# Patient Record
Sex: FEMALE | Race: WHITE | NOT HISPANIC OR LATINO | Employment: FULL TIME | URBAN - METROPOLITAN AREA
[De-identification: names, ages, dates, MRNs, and addresses within clinical notes are randomized per-mention and may not be internally consistent; named-entity substitution may affect disease eponyms.]

---

## 2018-12-27 NOTE — TELEPHONE ENCOUNTER
FUTURE VISIT INFORMATION      FUTURE VISIT INFORMATION:    Date: 1/2/19    Time:     Location: [facility] [place]  REFERRAL INFORMATION:    Referring provider:  self    Referring providers clinic:      Reason for visit/diagnosis  Low back and rt hip pain - No records or xrays - per pt     RECORDS REQUESTED FROM:       Clinic name Comments Records Status Imaging Status     internal

## 2018-12-31 ENCOUNTER — PATIENT OUTREACH (OUTPATIENT)
Dept: CARE COORDINATION | Facility: CLINIC | Age: 53
End: 2018-12-31

## 2019-01-02 ENCOUNTER — ANCILLARY PROCEDURE (OUTPATIENT)
Dept: GENERAL RADIOLOGY | Facility: CLINIC | Age: 54
End: 2019-01-02
Payer: COMMERCIAL

## 2019-01-02 ENCOUNTER — OFFICE VISIT (OUTPATIENT)
Dept: ORTHOPEDICS | Facility: CLINIC | Age: 54
End: 2019-01-02
Payer: COMMERCIAL

## 2019-01-02 ENCOUNTER — PRE VISIT (OUTPATIENT)
Dept: ORTHOPEDICS | Facility: CLINIC | Age: 54
End: 2019-01-02

## 2019-01-02 VITALS — BODY MASS INDEX: 36 KG/M2 | RESPIRATION RATE: 16 BRPM | WEIGHT: 224 LBS | HEIGHT: 66 IN

## 2019-01-02 DIAGNOSIS — M54.5 LOW BACK PAIN, UNSPECIFIED BACK PAIN LATERALITY, UNSPECIFIED CHRONICITY, WITH SCIATICA PRESENCE UNSPECIFIED: ICD-10-CM

## 2019-01-02 DIAGNOSIS — M16.12 PRIMARY OSTEOARTHRITIS OF LEFT HIP: ICD-10-CM

## 2019-01-02 DIAGNOSIS — M25.551 RIGHT HIP PAIN: ICD-10-CM

## 2019-01-02 DIAGNOSIS — M25.551 RIGHT HIP PAIN: Primary | ICD-10-CM

## 2019-01-02 DIAGNOSIS — M16.11 PRIMARY OSTEOARTHRITIS OF RIGHT HIP: ICD-10-CM

## 2019-01-02 DIAGNOSIS — M16.12 PRIMARY OSTEOARTHRITIS OF LEFT HIP: Primary | ICD-10-CM

## 2019-01-02 LAB
ANION GAP SERPL CALCULATED.3IONS-SCNC: 5 MMOL/L (ref 3–14)
BUN SERPL-MCNC: 21 MG/DL (ref 7–30)
CALCIUM SERPL-MCNC: 8.4 MG/DL (ref 8.5–10.1)
CHLORIDE SERPL-SCNC: 106 MMOL/L (ref 94–109)
CO2 SERPL-SCNC: 28 MMOL/L (ref 20–32)
CREAT SERPL-MCNC: 0.85 MG/DL (ref 0.52–1.04)
CRP SERPL-MCNC: 3.8 MG/L (ref 0–8)
ERYTHROCYTE [DISTWIDTH] IN BLOOD BY AUTOMATED COUNT: 13.8 % (ref 10–15)
ERYTHROCYTE [SEDIMENTATION RATE] IN BLOOD BY WESTERGREN METHOD: 10 MM/H (ref 0–30)
GFR SERPL CREATININE-BSD FRML MDRD: 78 ML/MIN/{1.73_M2}
GLUCOSE SERPL-MCNC: 103 MG/DL (ref 70–99)
HCT VFR BLD AUTO: 44.5 % (ref 35–47)
HGB BLD-MCNC: 14.4 G/DL (ref 11.7–15.7)
MCH RBC QN AUTO: 30.6 PG (ref 26.5–33)
MCHC RBC AUTO-ENTMCNC: 32.4 G/DL (ref 31.5–36.5)
MCV RBC AUTO: 95 FL (ref 78–100)
PLATELET # BLD AUTO: 305 10E9/L (ref 150–450)
POTASSIUM SERPL-SCNC: 4.1 MMOL/L (ref 3.4–5.3)
RBC # BLD AUTO: 4.7 10E12/L (ref 3.8–5.2)
SODIUM SERPL-SCNC: 139 MMOL/L (ref 133–144)
WBC # BLD AUTO: 11.4 10E9/L (ref 4–11)

## 2019-01-02 ASSESSMENT — MIFFLIN-ST. JEOR: SCORE: 1632.81

## 2019-01-02 NOTE — PROGRESS NOTES
" Subjective:   Licha Birch is a 53 year old female who presents back pain and bilateral hip pain. She notes 3 years ago picked up her grandson and had pain. Left lateral sided numbness on left leg for 20 years. Weakness in bilateral hips.  Managing through chiropractic and massage.  Lower quality of life past few months.  Tail bone areas into her hips.  Weakness from groin into thigh areas.  Over compensating.  Hard to put legs together, hard to sit  Socks are hard, hard to get in and out of the car.  Started with her LBP.    Background:   Date of injury: NA   Duration of symptoms: 3 years  Mechanism of Injury: Acute; Activity Related picking up child  Aggravating factors: trunk extension, quick movements, sleeping on side  Relieving Factors: trunk flexion, heat, ice, advil/tylenol  Prior Evaluation: chiropractor, massage    PAST MEDICAL, SOCIAL, SURGICAL AND FAMILY HISTORY: She  has no past medical history on file.  She  has no past surgical history on file.  Her family history is not on file.  She reports that she has been smoking cigarettes.  she has never used smokeless tobacco.    ALLERGIES: She has No Known Allergies.    CURRENT MEDICATIONS: She currently has no medications in their medication list.     REVIEW OF SYSTEMS: 10 point review of systems is negative except as noted above.     Exam:   Resp 16   Ht 1.676 m (5' 6\")   Wt 101.6 kg (224 lb)   BMI 36.15 kg/m             CONSTITUTIONAL: healthy, alert, no distress and cooperative  HEAD: Normocephalic. No masses, lesions, tenderness or abnormalities  SKIN: no suspicious lesions or rashes  GAIT: broad based and obese pattern  NEUROLOGIC: Non-focal  PSYCHIATRIC: affect normal/bright and mentation appears normal.    MUSCULOSKELETAL: bilateral LBp and hip pain  Tender:  Tailbone, left hip and right hip- groin pain  Non-tender:  thoracic spinous processes, left parathoracic muscles, right parathoracic muscles, lumbar spinous processes  Range of Motion:  lumbar " flexion  full, lumbar extension  full  Strength:  able to heel walk, able to toe walk  Special tests:  negative straight leg raises, tight hip flexors    Hip Exam: Hip ROM decreased- worse on L than R       Assessment/Plan:   Pt is a 52 yo obese white female with PMHx of obesity presenting with LBP and bilateral hip pain  1. Left hip OA- erosive, no fevers or signs of infection per patient  Will obtain baseline labs as pt doesn't have PMD- stable  Consider MRI with and without contrast  Obtain lateral image of left hip prior to next visit  CBC, ESR, CRP, BMP  2. Right hip OA- US guided injection offered, PT - thought might be able to get into land therapy, once in pool therapy she can stop the land, insurance will not typically pay for both  3. LBP- ROM  4. Obesity- recommended weight loss    X-RAY INTERPRETATION:   X-Ray of the Lumbar: 2-view, ap, lateral  ordered and interpreted in the office today was negative for fracture, subluxation or joint space abnormality.  Impression:  1. Mild disc height loss at L4-5 and L5-S1.  2. Degenerative changes involving both hips, advanced on the left with  erosive change.  X-Ray of the Hip: 2-view, ap pelvis, Right Frogleg Lateral ordered and interpreted in the office today was positive for IMPRESSION:   1. High-grade degenerative changes of the right femoral acetabular  joint.  2. Left hip with complete obliteration of the joint space, erosive  changes of the acetabulum and early collapse of the femoral head.  Recommend correlation for infection     [[facility]: Recommend clinical correlation for left hip infection.  Erosive changes in the acetabulum and early collapse of the left  femoral head.  X-Ray of the Hip: 2-view, ap pelvis, Left Frogleg Lateral as above

## 2019-01-02 NOTE — LETTER
"  1/2/2019      RE: Licha Birch  [address] [facility] [address]        Subjective:   Licha Birch is a 53 year old female who presents back pain and bilateral hip pain. She notes 3 years ago picked up her grandson and had pain. Left lateral sided numbness on left leg for 20 years. Weakness in bilateral hips.  Managing through chiropractic and massage.  Lower quality of life past few months.  Tail bone areas into her hips.  Weakness from groin into thigh areas.  Over compensating.  Hard to put legs together, hard to sit  Socks are hard, hard to get in and out of the car.  Started with her LBP.    Background:   Date of injury: NA   Duration of symptoms: 3 years  Mechanism of Injury: Acute; Activity Related picking up child  Aggravating factors: trunk extension, quick movements, sleeping on side  Relieving Factors: trunk flexion, heat, ice, advil/tylenol  Prior Evaluation: chiropractor, massage    PAST MEDICAL, SOCIAL, SURGICAL AND FAMILY HISTORY: She  has no past medical history on file.  She  has no past surgical history on file.  Her family history is not on file.  She reports that she has been smoking cigarettes.  she has never used smokeless tobacco.    ALLERGIES: She has No Known Allergies.    CURRENT MEDICATIONS: She currently has no medications in their medication list.     REVIEW OF SYSTEMS: 10 point review of systems is negative except as noted above.     Exam:   Resp 16   Ht 1.676 m (5' 6\")   Wt 101.6 kg (224 lb)   BMI 36.15 kg/m              CONSTITUTIONAL: healthy, alert, no distress and cooperative  HEAD: Normocephalic. No masses, lesions, tenderness or abnormalities  SKIN: no suspicious lesions or rashes  GAIT: broad based and obese pattern  NEUROLOGIC: Non-focal  PSYCHIATRIC: affect normal/bright and mentation appears normal.    MUSCULOSKELETAL: bilateral LBp and hip pain  Tender:  Tailbone, left hip and right hip- groin pain  Non-tender:  thoracic spinous processes, left parathoracic muscles, " right parathoracic muscles, lumbar spinous processes  Range of Motion:  lumbar flexion  full, lumbar extension  full  Strength:  able to heel walk, able to toe walk  Special tests:  negative straight leg raises, tight hip flexors    Hip Exam: Hip ROM decreased- worse on L than R       Assessment/Plan:   Pt is a 54 yo obese white female with PMHx of obesity presenting with LBP and bilateral hip pain  1. Left hip OA- erosive, no fevers or signs of infection per patient  Will obtain baseline labs as pt doesn't have PMD- stable  Consider MRI with and without contrast  Obtain lateral image of left hip prior to next visit  CBC, ESR, CRP, BMP  2. Right hip OA- US guided injection offered, PT - thought might be able to get into land therapy, once in pool therapy she can stop the land, insurance will not typically pay for both  3. LBP- ROM  4. Obesity- recommended weight loss    X-RAY INTERPRETATION:   X-Ray of the Lumbar: 2-view, ap, lateral  ordered and interpreted in the office today was negative for fracture, subluxation or joint space abnormality.  Impression:  1. Mild disc height loss at L4-5 and L5-S1.  2. Degenerative changes involving both hips, advanced on the left with  erosive change.  X-Ray of the Hip: 2-view, ap pelvis, Right Frogleg Lateral ordered and interpreted in the office today was positive for IMPRESSION:   1. High-grade degenerative changes of the right femoral acetabular  joint.  2. Left hip with complete obliteration of the joint space, erosive  changes of the acetabulum and early collapse of the femoral head.  Recommend correlation for infection     [[facility]: Recommend clinical correlation for left hip infection.  Erosive changes in the acetabulum and early collapse of the left  femoral head.  X-Ray of the Hip: 2-view, ap pelvis, Left Frogleg Lateral as above    Ina Jay MD

## 2019-01-02 NOTE — LETTER
Date:January 7, 2019      Patient was self referred, no letter generated. Do not send.        [facility] Health Information

## 2019-01-08 ENCOUNTER — TELEPHONE (OUTPATIENT)
Dept: ORTHOPEDICS | Facility: CLINIC | Age: 54
End: 2019-01-08

## 2019-01-08 DIAGNOSIS — M16.12 PRIMARY OSTEOARTHRITIS OF LEFT HIP: Primary | ICD-10-CM

## 2019-01-08 NOTE — TELEPHONE ENCOUNTER
I let patient know, per Dr. Jay, that she needs MRI of the left hip. I told her that I put the order in and has yet to be signed by Dr. Jay. There are appointments tomorrow before the injection, but according to the imaging department, a patient can follow through with a MRI if she is willing to sign a waiver that she may be self-pay. The patient will call in the morning to see when she can schedule a MRI and will try to coordinate an appointment with Dr. Jay. I let her know that Dr. Jay is in clinics on Wednesday and Friday. If she is able to coordinate the visits on a different day then she will cancel for tomorrow. If not, she will keep injection appointment tomorrow. Imaging number provided.

## 2019-01-08 NOTE — TELEPHONE ENCOUNTER
The patient wasn't quite sure why she was coming to the appointment tomorrow because of a VM left by Dr. Jay. Per Dr. Jay, she would like to inject patient's right hip and have a MRI on the left hip. The patient thought she was having both hips injected tomorrow. The patient would like to have MRI and injection on the same day. I don't know if that would be able to be coordinated, but will message Dr. Jay this to see if she is ok with this. Will try to get back with patient before appointment tomorrow just in case she wants to cancel.

## 2019-01-08 NOTE — TELEPHONE ENCOUNTER
[facility] Call Center    Phone Message    May a detailed message be left on voicemail: yes    Reason for Call: Other: Pt returning call to , pt would like to discuss the purpose of tomorrows appt.Please call pt back     Action Taken: Message routed to:  [facility] ([facility] sports med

## 2019-01-09 ENCOUNTER — OFFICE VISIT (OUTPATIENT)
Dept: ORTHOPEDICS | Facility: CLINIC | Age: 54
End: 2019-01-09
Payer: COMMERCIAL

## 2019-01-09 VITALS — WEIGHT: 224 LBS | BODY MASS INDEX: 36 KG/M2 | HEIGHT: 66 IN | RESPIRATION RATE: 18 BRPM

## 2019-01-09 DIAGNOSIS — M16.11 PRIMARY OSTEOARTHRITIS OF RIGHT HIP: Primary | ICD-10-CM

## 2019-01-09 RX ORDER — TRIAMCINOLONE ACETONIDE 40 MG/ML
80 INJECTION, SUSPENSION INTRA-ARTICULAR; INTRAMUSCULAR
Status: DISCONTINUED | OUTPATIENT
Start: 2019-01-09 | End: 2019-10-11

## 2019-01-09 RX ORDER — ROPIVACAINE HYDROCHLORIDE 5 MG/ML
3 INJECTION, SOLUTION EPIDURAL; INFILTRATION; PERINEURAL
Status: DISCONTINUED | OUTPATIENT
Start: 2019-01-09 | End: 2019-10-11

## 2019-01-09 RX ORDER — ROPIVACAINE HYDROCHLORIDE 5 MG/ML
2 INJECTION, SOLUTION EPIDURAL; INFILTRATION; PERINEURAL
Status: DISCONTINUED | OUTPATIENT
Start: 2019-01-09 | End: 2019-10-11

## 2019-01-09 RX ADMIN — ROPIVACAINE HYDROCHLORIDE 2 ML: 5 INJECTION, SOLUTION EPIDURAL; INFILTRATION; PERINEURAL at 15:44

## 2019-01-09 RX ADMIN — ROPIVACAINE HYDROCHLORIDE 3 ML: 5 INJECTION, SOLUTION EPIDURAL; INFILTRATION; PERINEURAL at 15:44

## 2019-01-09 RX ADMIN — TRIAMCINOLONE ACETONIDE 80 MG: 40 INJECTION, SUSPENSION INTRA-ARTICULAR; INTRAMUSCULAR at 15:44

## 2019-01-09 ASSESSMENT — MIFFLIN-ST. JEOR: SCORE: 1632.81

## 2019-01-09 NOTE — PROGRESS NOTES
" Subjective:   Licha Birch is a 53 year old female who is here for a right hip ultrasound guided kenalog injection.  Discussion regarding left hip OA.  Pt without fever, symptoms of infection, ordered MRI of left hip and due for procedure on Friday morning      Date of injury: NA  Date last seen: 1/8/2019  Following Therapeutic Plan: Yes   Pain: Unchanged  Function: Unchanged  Interval History:  Arthritis bilateral hips    PAST MEDICAL, SOCIAL, SURGICAL AND FAMILY HISTORY: She  has a past medical history of Osteoarthritis of left hip.  She  has no past surgical history on file.  Her family history is not on file.  She reports that she has been smoking cigarettes.  she has never used smokeless tobacco.    ALLERGIES: She has No Known Allergies.    CURRENT MEDICATIONS: She currently has no medications in their medication list.     REVIEW OF SYSTEMS: 10 point review of systems is negative except as noted above.     Exam:   Resp 18   Ht 1.676 m (5' 6\")   Wt 101.6 kg (224 lb)   BMI 36.15 kg/m             CONSTITUTIONAL: alert, mild distress and obese  HEAD: Normocephalic. No masses, lesions, tenderness or abnormalities  SKIN: no suspicious lesions or rashes  GAIT: antalgic and obese pattern  NEUROLOGIC: Non-focal  PSYCHIATRIC: affect normal/bright and mentation appears normal.    MUSCULOSKELETAL: right hip procedure  Subjective: right hip OA     Objective: decreased ROM right hip and groin pain     ASSESSMENT:right hip OA      PLAN: US-guided corticosteroid injection right hip for OA         PROCEDURE: After discussing the indications, risks, and expected benefits, a formal consent was obtained. The acetabulum, femoral head, femoral neck and the anterior joint recess were identified by ultrasound.  Initially, 3 mL 1% ropivicaine were injected along the injection path using US guidance.  Using sterile technique and an anterolateral approach, an ultrasound-guided corticosteroid injection was performed into the " right femoroacetabular joint space injecting 3 mL ropivicaine and 2 mL 40 mg/mL Kenalog with a 110 mm, 22 gauge needle. US used to guide needle placement and verify needle position.  Images and video were recorded demonstrating proper needle position. The wound was dressed with a bandaid.  The procedure was well tolerated.    Pt asked to f/u in 4 weeks.         Assessment/Plan:   Pt is a 54 yo obese white female with PMHx of smoking presenting with right hip pain  1. Left hip pain, severe OA- consider MRI, no systemic symptoms, consider surgery referral  2. Right hip OA- US guided hip injection performed    RTC 4 weeks      Large Joint Injection/Arthocentesis: R hip joint  Date/Time: 1/9/2019 3:44 PM  Performed by: Ina Jay MD  Authorized by: Ina Jay MD     Indications:  Osteoarthritis  Needle Size:  21 G  Guidance: ultrasound    Approach:  Anterior  Location:  Hip  Site:  R hip joint  Medications:  80 mg triamcinolone 40 MG/ML; 2 mL ropivacaine 5 MG/ML; 3 mL ropivacaine 5 MG/ML  Procedure discussed: discussed risks, benefits, and alternatives    Consent Given by:  Patient  Timeout: timeout called immediately prior to procedure    Prep: patient was prepped and draped in usual sterile fashion     15ml of ropivacaine was wasted per MD           X-RAY INTERPRETATION:   reviewed

## 2019-01-09 NOTE — LETTER
Date:January 10, 2019      Patient was self referred, no letter generated. Do not send.        [facility] Health Information

## 2019-01-09 NOTE — LETTER
"  1/9/2019      RE: Licha Birch  [address] [address]        Subjective:   Licha Birch is a 53 year old female who is here for a right hip ultrasound guided kenalog injection.  Discussion regarding left hip OA.  Pt without fever, symptoms of infection, ordered MRI of left hip and due for procedure on Friday morning      Date of injury: NA  Date last seen: 1/8/2019  Following Therapeutic Plan: Yes   Pain: Unchanged  Function: Unchanged  Interval History:  Arthritis bilateral hips    PAST MEDICAL, SOCIAL, SURGICAL AND FAMILY HISTORY: She  has a past medical history of Osteoarthritis of left hip.  She  has no past surgical history on file.  Her family history is not on file.  She reports that she has been smoking cigarettes.  she has never used smokeless tobacco.    ALLERGIES: She has No Known Allergies.    CURRENT MEDICATIONS: She currently has no medications in their medication list.     REVIEW OF SYSTEMS: 10 point review of systems is negative except as noted above.     Exam:   Resp 18   Ht 1.676 m (5' 6\")   Wt 101.6 kg (224 lb)   BMI 36.15 kg/m              CONSTITUTIONAL: alert, mild distress and obese  HEAD: Normocephalic. No masses, lesions, tenderness or abnormalities  SKIN: no suspicious lesions or rashes  GAIT: antalgic and obese pattern  NEUROLOGIC: Non-focal  PSYCHIATRIC: affect normal/bright and mentation appears normal.    MUSCULOSKELETAL: right hip procedure  Subjective: right hip OA     Objective: decreased ROM right hip and groin pain     ASSESSMENT:right hip OA      PLAN: US-guided corticosteroid injection right hip for OA         PROCEDURE: After discussing the indications, risks, and expected benefits, a formal consent was obtained. The acetabulum, femoral head, femoral neck and the anterior joint recess were identified by ultrasound.  Initially, 3 mL 1% ropivicaine were injected along the injection path using US guidance.  Using sterile technique and an anterolateral approach, an " ultrasound-guided corticosteroid injection was performed into the right femoroacetabular joint space injecting 3 mL ropivicaine and 2 mL 40 mg/mL Kenalog with a 110 mm, 22 gauge needle. US used to guide needle placement and verify needle position.  Images and video were recorded demonstrating proper needle position. The wound was dressed with a bandaid.  The procedure was well tolerated.    Pt asked to f/u in 4 weeks.         Assessment/Plan:   Pt is a 54 yo obese white female with PMHx of smoking presenting with right hip pain  1. Left hip pain, severe OA- consider MRI, no systemic symptoms, consider surgery referral  2. Right hip OA- US guided hip injection performed    RTC 4 weeks      Large Joint Injection/Arthocentesis: R hip joint  Date/Time: 1/9/2019 3:44 PM  Performed by: Ina Jay MD  Authorized by: Ina Jay MD     Indications:  Osteoarthritis  Needle Size:  21 G  Guidance: ultrasound    Approach:  Anterior  Location:  Hip  Site:  R hip joint  Medications:  80 mg triamcinolone 40 MG/ML; 2 mL ropivacaine 5 MG/ML; 3 mL ropivacaine 5 MG/ML  Procedure discussed: discussed risks, benefits, and alternatives    Consent Given by:  Patient  Timeout: timeout called immediately prior to procedure    Prep: patient was prepped and draped in usual sterile fashion     15ml of ropivacaine was wasted per MD           X-RAY INTERPRETATION:   reviewed    Ina Jay MD

## 2019-01-09 NOTE — TELEPHONE ENCOUNTER
I informed patient that Dr. Jay did sign that order and she can call imaging tonight to see if she can't get in the morning. The patient will try calling tonight.

## 2019-01-11 ENCOUNTER — OFFICE VISIT (OUTPATIENT)
Dept: ORTHOPEDICS | Facility: CLINIC | Age: 54
End: 2019-01-11
Payer: COMMERCIAL

## 2019-01-11 ENCOUNTER — ANCILLARY PROCEDURE (OUTPATIENT)
Dept: MRI IMAGING | Facility: CLINIC | Age: 54
End: 2019-01-11
Payer: COMMERCIAL

## 2019-01-11 VITALS — WEIGHT: 224 LBS | RESPIRATION RATE: 16 BRPM | HEIGHT: 66 IN | BODY MASS INDEX: 36 KG/M2

## 2019-01-11 DIAGNOSIS — M16.11 PRIMARY OSTEOARTHRITIS OF RIGHT HIP: ICD-10-CM

## 2019-01-11 DIAGNOSIS — M84.352A STRESS FRACTURE OF LEFT HIP: Primary | ICD-10-CM

## 2019-01-11 DIAGNOSIS — M25.552 LEFT HIP PAIN: ICD-10-CM

## 2019-01-11 DIAGNOSIS — M16.12 PRIMARY OSTEOARTHRITIS OF LEFT HIP: ICD-10-CM

## 2019-01-11 LAB — RADIOLOGIST FLAGS: ABNORMAL

## 2019-01-11 ASSESSMENT — MIFFLIN-ST. JEOR: SCORE: 1632.81

## 2019-01-11 NOTE — LETTER
"  1/11/2019      RE: Licha Birch  [address] [facility] [address] [facility] [address]       SUBJECTIVE: Licha Birch is a 53 year old female who was sent up from Radiology.  Pt reports she doesn't think the left hip was feeling that bad but the right hip is feeling better post-injection.  Now can really tell that the left hip hurts.    PAST MEDICAL, SOCIAL, SURGICAL AND FAMILY HISTORY: She  has a past medical history of Osteoarthritis of left hip.  She  has no past surgical history on file.  Her family history is not on file.  She reports that she has been smoking cigarettes.  she has never used smokeless tobacco.      ALLERGIES: She has No Known Allergies.    CURRENT MEDICATIONS: She has a current medication list which includes the following Facility-Administered Medications: ropivacaine, ropivacaine, and triamcinolone.     REVIEW OF SYSTEMS: 10 point review of systems is negative except as noted above.    EXAM:  Resp 16   Ht 1.676 m (5' 6\")   Wt 101.6 kg (224 lb)   BMI 36.15 kg/m     CONSTITUTIONIAL: healthy, alert, no distress and cooperative  HEAD: Normocephalic. No masses, lesions, tenderness or abnormalities  ENT: ENT exam normal, no neck nodes or sinus tenderness  SKIN: no suspicious lesions or rashes  GAIT: antalgic  Stance: normal  NEUROLOGIC: Normal muscle tone and strength, reflexes normal, sensation grossly normal.  PSYCHIATRIC: affect normal/bright and mentation appears normal.    MUSCULOSKELETAL: left hip pain  Palpation: Tender:   Left groin  Non-tender:  right greater trochanter, right gluteus medius, left ASIS, right ASIS  Range of Motion:  Decreased ROM, both hips  Strength:  decreased  Special tests:  no crepitation/snapping over central inguinal region        ASSESSMENT/PLAN:  Pt is a 52 yo white female with PMHx of low back pain presenting the left hip high grade stress fracture    1. Left hip femoral neck stress fracture with underlying OA- initially there were concerns for infection but labs and clinical " assessment are not consistent with infection  Placed on strict NWB, Crutches- pt thinks this will be difficult, roll a bout ordered  Bone health work-up with Mg, Phos, vit D, DEXA  Pt has been scheduled with Dr. Schuster to discuss L ALE in the future  RTC 4 weeks    X-RAY INTERPRETATION:   MRI left hip      Ina Jay MD

## 2019-01-11 NOTE — LETTER
Date:January 15, 2019      Patient was self referred, no letter generated. Do not send.        [facility] Health Information

## 2019-01-11 NOTE — PROGRESS NOTES
"SUBJECTIVE: Licha Birch is a 53 year old female who was sent up from Radiology.  Pt reports she doesn't think the left hip was feeling that bad but the right hip is feeling better post-injection.  Now can really tell that the left hip hurts.    PAST MEDICAL, SOCIAL, SURGICAL AND FAMILY HISTORY: She  has a past medical history of Osteoarthritis of left hip.  She  has no past surgical history on file.  Her family history is not on file.  She reports that she has been smoking cigarettes.  she has never used smokeless tobacco.      ALLERGIES: She has No Known Allergies.    CURRENT MEDICATIONS: She has a current medication list which includes the following Facility-Administered Medications: ropivacaine, ropivacaine, and triamcinolone.     REVIEW OF SYSTEMS: 10 point review of systems is negative except as noted above.    EXAM:  Resp 16   Ht 1.676 m (5' 6\")   Wt 101.6 kg (224 lb)   BMI 36.15 kg/m    CONSTITUTIONIAL: healthy, alert, no distress and cooperative  HEAD: Normocephalic. No masses, lesions, tenderness or abnormalities  ENT: ENT exam normal, no neck nodes or sinus tenderness  SKIN: no suspicious lesions or rashes  GAIT: antalgic  Stance: normal  NEUROLOGIC: Normal muscle tone and strength, reflexes normal, sensation grossly normal.  PSYCHIATRIC: affect normal/bright and mentation appears normal.    MUSCULOSKELETAL: left hip pain  Palpation: Tender:   Left groin  Non-tender:  right greater trochanter, right gluteus medius, left ASIS, right ASIS  Range of Motion:  Decreased ROM, both hips  Strength:  decreased  Special tests:  no crepitation/snapping over central inguinal region        ASSESSMENT/PLAN:  Pt is a 54 yo white female with PMHx of low back pain presenting the left hip high grade stress fracture    1. Left hip femoral neck stress fracture with underlying OA- initially there were concerns for infection but labs and clinical assessment are not consistent with infection  Placed on strict NWB, Crutches- " pt thinks this will be difficult, roll a bout ordered  Bone health work-up with Mg, Phos, vit D, DEXA  Pt has been scheduled with Dr. Schuster to discuss L ALE in the future  RTC 4 weeks    X-RAY INTERPRETATION:   MRI left hip

## 2019-01-15 ENCOUNTER — PRE VISIT (OUTPATIENT)
Dept: ORTHOPEDICS | Facility: CLINIC | Age: 54
End: 2019-01-15

## 2019-01-15 NOTE — TELEPHONE ENCOUNTER
RECORDS RECEIVED FROM: internal   DATE RECEIVED: 01/15/2019   NOTES STATUS DETAILS   OFFICE NOTE from referring provider Internal    OFFICE NOTE from other specialist Internal    DISCHARGE SUMMARY from hospital N/A    DISCHARGE REPORT from the ER N/A    OPERATIVE REPORT N/A    MEDICATION LIST internal    IMPLANT RECORD/STICKER N/A    LABS     CBC/DIFF Internal    CULTURES N/A    INJECTIONS DONE IN RADIOLOGY N/A    MRI Internal    CT SCAN N/A    XRAYS (IMAGES & REPORTS) Internal    TUMOR     PATHOLOGY  Slides & report N/A

## 2019-01-16 NOTE — PROGRESS NOTES
[facility]  Orthopaedic Surgery, Joint Replacement Consultation  by Rashid Schuster M.D.    Licha Birch MRN# [id number]   Age: 53 year old YOB: 1965     Requesting physician: Ina Jay  No Ref-Primary, Physician             Assessment and Plan:   Assessment:  53 year old female who presents today with bilateral hip pain. Imaging ordered by Dr. Jay on 1/11/19 revealed severe OA of bilateral hips, worse on left being end-stage in nature with complete loss of cartilage thickness and subchondral cyst formation.  Edema on the MRI within the left femoral neck is suggestive of underlying stress fracture but could also be related to presence of her severe arthritis about the hip.  He has no prior history of stress fractures or metabolic bone disease.    Despite the severe end-stage nature of her degenerative joint disease seen radiographically moderately severe disease on the right side, her painful symptoms at this point are quite minimal.  She does have symptoms related to stiffness and loss of motion.  However, based upon her clinical picture, and a joint replacement procedure is not indicated at the present time.  I suspect, however, that her symptoms will become progressively severe in the near future and she will wish to consider a left hip replacement surgery.     Plan:  1. I recommend patient begin ambulating with a cane if needed.  She should discontinue use of the knee scooter as this does not unload the weight on her left hip joint.  Weightbearing as tolerated.  2. We will order a metabolic bone screening tests.   3. Counseled patient on diet and exercise recommendations to obtain a goal BMI of 35 (currently 36.14).   4. Smoking cessation strongly advised in preparation for possible future joint replacement surgery  5. Follow-up in 6-12 months or sooner as needed. Consider possible ALE in the future.           History of Present Illness:   53 year old female who  "presents today with bilateral hip pain. Patient originally saw Dr. Jay on 1/2/19 for chronic bilateral hip pain. She had X-rays of her hips and back which revealed evidence of bilateral hip OA as well as a possible fracture of the left the hip. She had a right hip corticosterone injection at the time which relieved much of her pain. She did not elect to have her left hip injected. She notes significant improvement in both hips with rest. She also feels that her mobility and sleep has much improved over the past 1.5 weeks.     Her main concern today is the possibility of a left hip fracture. She continues to have problems with groin mobility and with activities such as tying her shoes and getting out of a car. She is currently ambulating with a rollabout. She has been seeing a chiropractor and massage therapist for the past 3 years.       Current symptoms:  Problem: bilateral hip pain  Onset and duration: chronic   Awakens from sleep due to sx's:  Yes  Precipitating Injury:  No    Other joints or sites painful:  No      Background history:  DX:  1. Bilateral hip OA.     TREATMENTS:  1. None to date.                Physical Exam:     EXAMINATION pertinent findings:   VITAL SIGNS: Height 1.685 m (5' 6.34\"), weight 102.6 kg (226 lb 3.2 oz).  Body mass index is 36.14 kg/m .  RESP: non labored breathing   ABD: benign   SKIN: grossly normal   LYMPHATIC: grossly normal bilaterally   NEURO: grossly normal   VASCULAR: satisfactory perfusion of all extremities. 2+ dorsalis pedis pulsus bilaterally.   MUSCULOSKELETAL:   Antalgic gait  Normal knee examination.     Right hip:  Able to flex right hip to 95    External rotation to 30   Internal rotation to 0   abduction to 20    Adduction to 5     Left hip:  Able to flex to 90    External rotation to 15   Internal rotation to -5   Abduction to 20   adduction to 0              Data:   Radiographs  X-Ray of the Hip: 2-view (1/2/19)  1. High-grade degenerative changes of the " right femoral acetabular  joint.  2. Left hip with complete obliteration of the joint space, erosive  changes of the acetabulum and early collapse of the femoral head.      All laboratory data reviewed  All imaging studies reviewed by me      Rashid Schuster MD  [facility] Family Professor  Oncology and Adult Reconstructive Surgery  Dept Orthopaedic Surgery, [facility]  [phone] office, [phone] pager  [email or web address]      Scribe Disclosure:   I, Isaac Vu, am serving as a scribe to document services personally performed by Rashid Schuster MD at this visit, based upon the provider's statements to me. All documentation has been reviewed by the aforementioned provider prior to being entered into the official medical record.      DATA for DOCUMENTATION:         Past Medical History:     Patient Active Problem List   Diagnosis     Primary osteoarthritis of left hip     Osteoarthritis of right hip     Past Medical History:   Diagnosis Date     Osteoarthritis of left hip        Also see scanned health assessment forms.       Past Surgical History:   No past surgical history on file.         Social History:     Social History     Socioeconomic History     Marital status: Single     Spouse name: Not on file     Number of children: Not on file     Years of education: Not on file     Highest education level: Not on file   Social Needs     Financial resource strain: Not on file     Food insecurity - worry: Not on file     Food insecurity - inability: Not on file     Transportation needs - medical: Not on file     Transportation needs - non-medical: Not on file   Occupational History     Not on file   Tobacco Use     Smoking status: Current Every Day Smoker     Types: Cigarettes     Smokeless tobacco: Never Used   Substance and Sexual Activity     Alcohol use: Not on file     Drug use: Not on file     Sexual activity: Not on file   Other Topics Concern     Not on file   Social History Narrative     Not on file             Family History:     No family history on file.         Medications:     Current Outpatient Medications   Medication Sig     CALCIUM-VITAMIN D PO      GLUCOSA-CHONDR-NA CHONDR-MSM PO      order for DME Roll-A-Bout Walker. Patient can use for left hip pain due to high grade stress fracture     Current Facility-Administered Medications   Medication     ropivacaine (NAROPIN) injection 2 mL     ropivacaine (NAROPIN) injection 3 mL     triamcinolone (KENALOG-40) injection 80 mg              Review of Systems:   A comprehensive 10 point review of systems (constitutional, ENT, cardiac, peripheral vascular, lymphatic, respiratory, GI, , Musculoskeletal, skin, Neurological) was performed and found to be negative except as described in this note.     See intake form completed by patient      Answers for HPI/ROS submitted by the patient on 1/21/2019   General Symptoms: No  Skin Symptoms: No  HENT Symptoms: No  EYE SYMPTOMS: No  HEART SYMPTOMS: No  LUNG SYMPTOMS: No  INTESTINAL SYMPTOMS: No  URINARY SYMPTOMS: No  GYNECOLOGIC SYMPTOMS: No  BREAST SYMPTOMS: No  SKELETAL SYMPTOMS: Yes  BLOOD SYMPTOMS: No  NERVOUS SYSTEM SYMPTOMS: No  MENTAL HEALTH SYMPTOMS: No  Back pain: Yes  Muscle aches: Yes  Neck pain: No  Swollen joints: No  Joint pain: Yes  Bone pain: No  Muscle cramps: Yes  Muscle weakness: Yes  Joint stiffness: Yes  Bone fracture: Yes

## 2019-01-21 ENCOUNTER — OFFICE VISIT (OUTPATIENT)
Dept: ORTHOPEDICS | Facility: CLINIC | Age: 54
End: 2019-01-21
Payer: COMMERCIAL

## 2019-01-21 VITALS — BODY MASS INDEX: 36.35 KG/M2 | WEIGHT: 226.2 LBS | HEIGHT: 66 IN

## 2019-01-21 DIAGNOSIS — M16.0 PRIMARY OSTEOARTHRITIS OF BOTH HIPS: ICD-10-CM

## 2019-01-21 DIAGNOSIS — M84.352A STRESS FRACTURE OF LEFT HIP: ICD-10-CM

## 2019-01-21 DIAGNOSIS — M16.0 PRIMARY OSTEOARTHRITIS OF BOTH HIPS: Primary | ICD-10-CM

## 2019-01-21 LAB
ALP SERPL-CCNC: 120 U/L (ref 40–150)
BUN SERPL-MCNC: 20 MG/DL (ref 7–30)
CALCIUM SERPL-MCNC: 9.1 MG/DL (ref 8.5–10.1)
CREAT SERPL-MCNC: 0.87 MG/DL (ref 0.52–1.04)
DEPRECATED CALCIDIOL+CALCIFEROL SERPL-MC: 22 UG/L (ref 20–75)
GFR SERPL CREATININE-BSD FRML MDRD: 76 ML/MIN/{1.73_M2}
MAGNESIUM SERPL-MCNC: 2.1 MG/DL (ref 1.6–2.3)
PHOSPHATE SERPL-MCNC: 3.5 MG/DL (ref 2.5–4.5)
PTH-INTACT SERPL-MCNC: 72 PG/ML (ref 18–80)
TSH SERPL DL<=0.005 MIU/L-ACNC: 3.12 MU/L (ref 0.4–4)

## 2019-01-21 ASSESSMENT — ACTIVITIES OF DAILY LIVING (ADL)
ADL_SUBSCALE_SCORE: 33.82
ADL_SUM: 45
ADL_MEAN: 2.64

## 2019-01-21 ASSESSMENT — ENCOUNTER SYMPTOMS
JOINT SWELLING: 0
MUSCLE WEAKNESS: 1
NECK PAIN: 0
BACK PAIN: 1
ARTHRALGIAS: 1
MYALGIAS: 1
STIFFNESS: 1
MUSCLE CRAMPS: 1

## 2019-01-21 ASSESSMENT — HOOS S4: HOW SEVERE IS YOUR HIP JOINT STIFFNESS AFTER FIRST WAKENING IN THE MORNING?: SEVERE

## 2019-01-21 ASSESSMENT — MIFFLIN-ST. JEOR: SCORE: 1648.17

## 2019-01-21 NOTE — NURSING NOTE
"Chief Complaint   Patient presents with     Consult     Pt. states that she is here today for Left Hip Stress FX. Referring:  RADHA MATTHEW DANA       53 year old  1965    Ht 1.685 m (5' 6.34\")   Wt 102.6 kg (226 lb 3.2 oz)   BMI 36.14 kg/m            [facility] [address] - [place], [place] - [facility] [address] [facility] [place] [address]    No Known Allergies    Current Outpatient Medications   Medication     CALCIUM-VITAMIN D PO     GLUCOSA-CHONDR-NA CHONDR-MSM PO     order for DME     Current Facility-Administered Medications   Medication     ropivacaine (NAROPIN) injection 2 mL     ropivacaine (NAROPIN) injection 3 mL     triamcinolone (KENALOG-40) injection 80 mg         Questionnaires:    HOOS Hip Dysfunction & Osteoarthritis Outcome Questionnaire    Hip Dysfunction & Osteoarthritis Outcome Score (HOOS), English Version LK 2.0 (Lali RAMOS, Alex ROTHMAN, Zoe DAVIS, 2003) 1/21/2019   S1. Do you feel grinding, hear clicking or any other type of noise from your hip? Never   S2. Difficulties spreading legs wide apart Severe   S3. Difficulties to stride out when walking Severe   S4. How severe is your hip joint stiffness after first wakening in the morning? Severe   S5. How severe is your hip stiffness after sitting, lying or resting LATER IN THE DAY? Moderate   Symptom Count 5   Symptom Sum 11   Symptom Mean 2.2   Symptom Subscale Score 45   P1. How often is your hip painful? Weekly   P2. Straightening your hip fully Severe   P3. Bending your hip FULLY Moderate   P4. Walking on a flat surface Moderate   P5. Going up or down stairs Severe   P6. At night while in bed Moderate   P7. Sitting or lying Mild   P8. Standing upright Moderate   P9. Walking on a hard surface (asphalt, concrete, etc.) Moderate   P10. Walking on an uneven surface Severe   Pain Count 10   Pain Sum 22   Pain Mean 2.2   Pain Subscale Score 45   A1. Descending stairs Moderate   A2. Ascending stairs Severe   A3. Rising from " sitting Severe   A4. Standing Moderate   A5. Bending to the floor/ an object Severe   A6. Walking on a flat surface Moderate   A7. Getting in/out of car Severe   A8. Going shopping Moderate   A9. Putting on socks/stockings Extreme   A10. Rising from bed Moderate   A11. Taking off socks/stockings Severe   A12. Lying in bed (turning over, maintaining hip position) Severe   A13. Getting in/out of bed Moderate   A14. Sitting Moderate   A15. Getting on/off toilet Severe   A16. Heavy domestic duties (moving heavy boxes, scrubbing floors, etc.) Severe   A17. Light domestic duties (cooking, dusting, etc.) Severe   ADL Count 17   ADL Sum 45   ADL Mean 2.64   ADL Subscale Score 33.82   SP1. Squatting Extreme   SP2. Running Extreme   SP3. Twisting/pivoting on loaded leg Severe   SP4. Walking on uneven surface Severe   Sports/Rec Count 4   Sports/Rec Sum 14   Sports/Rec Mean 3.5   Sports/Rec Subscale Score 12.5   Q1. How often are you aware of your hip problem? Constantly   Q2. Have you modified you life style to avoid activities potentially damaging to your hip? Severely   Q3. How much are you troubled with lack of confidence in your hip? Severely   Q4. In general, how much difficulty do you have with your hip? Severe   QOL Count 4   QOL Sum 13   QOL Mean 3.25   Quality of Life Subscale Score 18.75                Promis 10 Assessment    PROMIS 10 1/21/2019   In general, would you say your health is: Very good   In general, would you say your quality of life is: Very good   In general, how would you rate your physical health? Very good   In general, how would you rate your mental health, including your mood and your ability to think? Excellent   In general, how would you rate your satisfaction with your social activities and relationships? Excellent   In general, please rate how well you carry out your usual social activities and roles Fair   To what extent are you able to carry out your everyday physical activities such  as walking, climbing stairs, carrying groceries, or moving a chair? A little   How often have you been bothered by emotional problems such as feeling anxious, depressed or irritable? Rarely   How would you rate your fatigue on average? Moderate   How would you rate your pain on average?   0 = No Pain  to  10 = Worst Imaginable Pain 5   In general, would you say your health is: 4   In general, would you say your quality of life is: 4   In general, how would you rate your physical health? 4   In general, how would you rate your mental health, including your mood and your ability to think? 5   In general, how would you rate your satisfaction with your social activities and relationships? 5   In general, please rate how well you carry out your usual social activities and roles. (This includes activities at home, at work and in your community, and responsibilities as a parent, child, spouse, employee, friend, etc.) 2   To what extent are you able to carry out your everyday physical activities such as walking, climbing stairs, carrying groceries, or moving a chair? 2   In the past 7 days, how often have you been bothered by emotional problems such as feeling anxious, depressed, or irritable? 2   In the past 7 days, how would you rate your fatigue on average? 3   In the past 7 days, how would you rate your pain on average, where 0 means no pain, and 10 means worst imaginable pain? 5   Global Mental Health Score 18   Global Physical Health Score 12   PROMIS TOTAL - SUBSCORES 30   Some recent data might be hidden

## 2019-01-21 NOTE — LETTER
1/21/2019       RE: Licha Birch  [address] [address]     Dear Colleague,    Thank you for referring your patient, Licha Bicrh, to the [facility]. Please see a copy of my visit note below.        [facility]  Orthopaedic Surgery, Joint Replacement Consultation  by Rashid Schuster M.D.    Licha Birch MRN# [id number]   Age: 53 year old YOB: 1965     Requesting physician: Ina Jay  No Ref-Primary, Physician             Assessment and Plan:   Assessment:  53 year old female who presents today with bilateral hip pain. Imaging ordered by Dr. Jay on 1/11/19 revealed severe OA of bilateral hips, worse on left being end-stage in nature with complete loss of cartilage thickness and subchondral cyst formation.  Edema on the MRI within the left femoral neck is suggestive of underlying stress fracture but could also be related to presence of her severe arthritis about the hip.  He has no prior history of stress fractures or metabolic bone disease.    Despite the severe end-stage nature of her degenerative joint disease seen radiographically moderately severe disease on the right side, her painful symptoms at this point are quite minimal.  She does have symptoms related to stiffness and loss of motion.  However, based upon her clinical picture, and a joint replacement procedure is not indicated at the present time.  I suspect, however, that her symptoms will become progressively severe in the near future and she will wish to consider a left hip replacement surgery.     Plan:  1. I recommend patient begin ambulating with a cane if needed.  She should discontinue use of the knee scooter as this does not unload the weight on her left hip joint.  Weightbearing as tolerated.  2. We will order a metabolic bone screening tests.   3. Counseled patient on diet and exercise recommendations to obtain a goal BMI of 35  "(currently 36.14).   4. Smoking cessation strongly advised in preparation for possible future joint replacement surgery  5. Follow-up in 6-12 months or sooner as needed. Consider possible ALE in the future.           History of Present Illness:   53 year old female who presents today with bilateral hip pain. Patient originally saw Dr. Jay on 1/2/19 for chronic bilateral hip pain. She had X-rays of her hips and back which revealed evidence of bilateral hip OA as well as a possible fracture of the left the hip. She had a right hip corticosterone injection at the time which relieved much of her pain. She did not elect to have her left hip injected. She notes significant improvement in both hips with rest. She also feels that her mobility and sleep has much improved over the past 1.5 weeks.     Her main concern today is the possibility of a left hip fracture. She continues to have problems with groin mobility and with activities such as tying her shoes and getting out of a car. She is currently ambulating with a rollabout. She has been seeing a chiropractor and massage therapist for the past 3 years.       Current symptoms:  Problem: bilateral hip pain  Onset and duration: chronic   Awakens from sleep due to sx's:  Yes  Precipitating Injury:  No    Other joints or sites painful:  No      Background history:  DX:  1. Bilateral hip OA.     TREATMENTS:  1. None to date.                Physical Exam:     EXAMINATION pertinent findings:   VITAL SIGNS: Height 1.685 m (5' 6.34\"), weight 102.6 kg (226 lb 3.2 oz).  Body mass index is 36.14 kg/m .  RESP: non labored breathing   ABD: benign   SKIN: grossly normal   LYMPHATIC: grossly normal bilaterally   NEURO: grossly normal   VASCULAR: satisfactory perfusion of all extremities. 2+ dorsalis pedis pulsus bilaterally.   MUSCULOSKELETAL:   Antalgic gait  Normal knee examination.     Right hip:  Able to flex right hip to 95     External rotation to 30   Internal rotation to " 0   abduction to 20     Adduction to 5     Left hip:  Able to flex to 90     External rotation to 15   Internal rotation to -5   Abduction to 20   adduction to 0              Data:   Radiographs  X-Ray of the Hip: 2-view (1/2/19)  1. High-grade degenerative changes of the right femoral acetabular  joint.  2. Left hip with complete obliteration of the joint space, erosive  changes of the acetabulum and early collapse of the femoral head.      All laboratory data reviewed  All imaging studies reviewed by me      Scribe Disclosure:   IIsaac, am serving as a scribe to document services personally performed by Rashid Schuster MD at this visit, based upon the provider's statements to me. All documentation has been reviewed by the aforementioned provider prior to being entered into the official medical record.      DATA for DOCUMENTATION:         Past Medical History:     Patient Active Problem List   Diagnosis     Primary osteoarthritis of left hip     Osteoarthritis of right hip     Past Medical History:   Diagnosis Date     Osteoarthritis of left hip        Also see scanned health assessment forms.       Past Surgical History:   No past surgical history on file.         Social History:     Social History     Socioeconomic History     Marital status: Single     Spouse name: Not on file     Number of children: Not on file     Years of education: Not on file     Highest education level: Not on file   Social Needs     Financial resource strain: Not on file     Food insecurity - worry: Not on file     Food insecurity - inability: Not on file     Transportation needs - medical: Not on file     Transportation needs - non-medical: Not on file   Occupational History     Not on file   Tobacco Use     Smoking status: Current Every Day Smoker     Types: Cigarettes     Smokeless tobacco: Never Used   Substance and Sexual Activity     Alcohol use: Not on file     Drug use: Not on file     Sexual activity: Not on file   Other  Topics Concern     Not on file   Social History Narrative     Not on file            Family History:     No family history on file.         Medications:     Current Outpatient Medications   Medication Sig     CALCIUM-VITAMIN D PO      GLUCOSA-CHONDR-NA CHONDR-MSM PO      order for DME Roll-A-Bout Walker. Patient can use for left hip pain due to high grade stress fracture     Current Facility-Administered Medications   Medication     ropivacaine (NAROPIN) injection 2 mL     ropivacaine (NAROPIN) injection 3 mL     triamcinolone (KENALOG-40) injection 80 mg              Review of Systems:   A comprehensive 10 point review of systems (constitutional, ENT, cardiac, peripheral vascular, lymphatic, respiratory, GI, , Musculoskeletal, skin, Neurological) was performed and found to be negative except as described in this note.     See intake form completed by patient      Again, thank you for allowing me to participate in the care of your patient.      Sincerely,    Rashid Schuster MD

## 2019-01-22 NOTE — RESULT ENCOUNTER NOTE
All of your test results looking into a metabolic bone disease are either normal or are clinically acceptable.    Rashid Schuster MD  1/21/2019  9:37 PM

## 2019-02-15 ENCOUNTER — HEALTH MAINTENANCE LETTER (OUTPATIENT)
Age: 54
End: 2019-02-15

## 2019-07-05 ENCOUNTER — DOCUMENTATION ONLY (OUTPATIENT)
Dept: CARE COORDINATION | Facility: CLINIC | Age: 54
End: 2019-07-05

## 2019-07-09 ENCOUNTER — TELEPHONE (OUTPATIENT)
Dept: ORTHOPEDICS | Facility: CLINIC | Age: 54
End: 2019-07-09

## 2019-07-09 NOTE — TELEPHONE ENCOUNTER
Appointment was moved to 7:20 am tomorrow for a 40 minute procedure visit. The patient was agreeable to this. The prior visit was erroneously scheduled for a 20 minute return visit.

## 2019-07-10 ENCOUNTER — OFFICE VISIT (OUTPATIENT)
Dept: ORTHOPEDICS | Facility: CLINIC | Age: 54
End: 2019-07-10
Payer: COMMERCIAL

## 2019-07-10 VITALS — HEIGHT: 66 IN | WEIGHT: 237 LBS | BODY MASS INDEX: 38.09 KG/M2

## 2019-07-10 DIAGNOSIS — M16.11 PRIMARY OSTEOARTHRITIS OF RIGHT HIP: Primary | ICD-10-CM

## 2019-07-10 RX ORDER — TRIAMCINOLONE ACETONIDE 40 MG/ML
80 INJECTION, SUSPENSION INTRA-ARTICULAR; INTRAMUSCULAR
Status: DISCONTINUED | OUTPATIENT
Start: 2019-07-10 | End: 2019-10-11

## 2019-07-10 RX ORDER — ROPIVACAINE HYDROCHLORIDE 5 MG/ML
3 INJECTION, SOLUTION EPIDURAL; INFILTRATION; PERINEURAL
Status: DISCONTINUED | OUTPATIENT
Start: 2019-07-10 | End: 2019-10-11

## 2019-07-10 RX ADMIN — ROPIVACAINE HYDROCHLORIDE 3 ML: 5 INJECTION, SOLUTION EPIDURAL; INFILTRATION; PERINEURAL at 07:58

## 2019-07-10 RX ADMIN — TRIAMCINOLONE ACETONIDE 80 MG: 40 INJECTION, SUSPENSION INTRA-ARTICULAR; INTRAMUSCULAR at 07:58

## 2019-07-10 ASSESSMENT — PAIN SCALES - GENERAL: PAINLEVEL: SEVERE PAIN (7)

## 2019-07-10 ASSESSMENT — MIFFLIN-ST. JEOR: SCORE: 1692.17

## 2019-07-10 NOTE — PROGRESS NOTES
MUSCULOSKELETAL: right hip procedure  Subjective: right hip OA     Objective: decreased ROM right hip and groin pain     ASSESSMENT:right hip OA      PLAN: US-guided corticosteroid injection right hip for OA         PROCEDURE: After discussing the indications, risks, and expected benefits, a formal consent was obtained. The acetabulum, femoral head, femoral neck and the anterior joint recess were identified by ultrasound.  Initially, 5 mL 1% ropivicaine were injected along the injection path using US guidance.  Using sterile technique and an anterolateral approach, an ultrasound-guided corticosteroid injection was performed into the right femoroacetabular joint space injecting 5 mL ropivicaine and 2 mL 40 mg/mL Kenalog with a 110 mm, 22 gauge needle. US used to guide needle placement and verify needle position.  Images and video were recorded demonstrating proper needle position. The wound was dressed with a bandaid.  The procedure was well tolerated.    Pt asked to f/u in 4 weeks.

## 2019-07-10 NOTE — PROGRESS NOTES
Large Joint Injection/Arthocentesis: R hip joint  Date/Time: 7/10/2019 7:58 AM  Performed by: Ina Jay MD  Authorized by: Ina Jay MD     Indications:  Osteoarthritis  Needle Size:  21 G  Guidance: ultrasound    Approach:  Anterior  Location:  Hip      Site:  R hip joint  Medications:  80 mg triamcinolone 40 MG/ML; 3 mL ropivacaine 5 MG/ML  Outcome:  Tolerated well, no immediate complications  Procedure discussed: discussed risks, benefits, and alternatives    Consent Given by:  Patient  Timeout: timeout called immediately prior to procedure    Prep: patient was prepped and draped in usual sterile fashion     10 mL ropivacaine used.  Scribed by Lauren Telles MS, ATC for Dr. Jay on 7/10/2019 at 8:00 AM, based on the provider's statements to me.        I agree with the above documentation.  ANALI Jay MD

## 2019-07-10 NOTE — NURSING NOTE
[address] [address]  Dept: [phone]  ______________________________________________________________________________    Patient: Licha Birch   : 1965   MRN: [id number]   July 10, 2019    INVASIVE PROCEDURE SAFETY CHECKLIST    Date: 7/10/2019   Procedure: Right hip US guided CSI  Patient Name: Licha Birch  MRN: [id number]  YOB: 1965    Action: Complete sections as appropriate. Any discrepancy results in a HARD COPY until resolved.     PRE PROCEDURE:  Patient ID verified with 2 identifiers (name and  or MRN): Yes  Procedure and site verified with patient/designee (when able): Yes  Accurate consent documentation in medical record: Yes  H&P (or appropriate assessment) documented in medical record: Yes  H&P must be up to 20 days prior to procedure and updates within 24 hours of procedure as applicable: NA  Relevant diagnostic and radiology test results appropriately labeled and displayed as applicable: Yes  Procedure site(s) marked with provider initials: NA    TIMEOUT:  Time-Out performed immediately prior to starting procedure, including verbal and active participation of all team members addressing the following:Yes  * Correct patient identify  * Confirmed that the correct side and site are marked  * An accurate procedure consent form  * Agreement on the procedure to be done  * Correct patient position  * Relevant images and results are properly labeled and appropriately displayed  * The need to administer antibiotics or fluids for irrigation purposes during the procedure as applicable   * Safety precautions based on patient history or medication use    DURING PROCEDURE: Verification of correct person, site, and procedures any time the responsibility for care of the patient is transferred to another member of the care team.       Prior to injection, verified patient identity using patient's name and date of birth.  Due to  injection administration, patient instructed to remain in clinic for 15 minutes  afterwards, and to report any adverse reaction to me immediately.    Joint injection was performed.     Drug Amount Wasted:  Yes: 10 mg/ml ropivacaine and 0 mL kenalog   Vial/Syringe: Single dose vial  Expiration Date:  06/22 and 03/2021      Lauren Telles, ATC  July 10, 2019

## 2019-07-10 NOTE — LETTER
7/10/2019      RE: Licha Birch  [address] [facility] [address]       Large Joint Injection/Arthocentesis: R hip joint  Date/Time: 7/10/2019 7:58 AM  Performed by: Ina Jay MD  Authorized by: Ina Jay MD     Indications:  Osteoarthritis  Needle Size:  21 G  Guidance: ultrasound    Approach:  Anterior  Location:  Hip      Site:  R hip joint  Medications:  80 mg triamcinolone 40 MG/ML; 3 mL ropivacaine 5 MG/ML  Outcome:  Tolerated well, no immediate complications  Procedure discussed: discussed risks, benefits, and alternatives    Consent Given by:  Patient  Timeout: timeout called immediately prior to procedure    Prep: patient was prepped and draped in usual sterile fashion     10 mL ropivacaine used.  Scribed by Lauren Telles MS, ATC for Dr. Jay on 7/10/2019 at 8:00 AM, based on the provider's statements to me.        I agree with the above documentation.  ANALI Jay MD    MUSCULOSKELETAL: right hip procedure  Subjective: right hip OA     Objective: decreased ROM right hip and groin pain     ASSESSMENT:right hip OA      PLAN: US-guided corticosteroid injection right hip for OA         PROCEDURE: After discussing the indications, risks, and expected benefits, a formal consent was obtained. The acetabulum, femoral head, femoral neck and the anterior joint recess were identified by ultrasound.  Initially, 5 mL 1% ropivicaine were injected along the injection path using US guidance.  Using sterile technique and an anterolateral approach, an ultrasound-guided corticosteroid injection was performed into the right femoroacetabular joint space injecting 5 mL ropivicaine and 2 mL 40 mg/mL Kenalog with a 110 mm, 22 gauge needle. US used to guide needle placement and verify needle position.  Images and video were recorded demonstrating proper needle position. The wound was dressed with a bandaid.  The procedure was well tolerated.    Pt asked to f/u in 4 weeks.    Ina  Delfina Jay MD

## 2019-08-15 ENCOUNTER — OFFICE VISIT (OUTPATIENT)
Dept: ORTHOPEDICS | Facility: CLINIC | Age: 54
End: 2019-08-15
Payer: COMMERCIAL

## 2019-08-15 ENCOUNTER — ANCILLARY PROCEDURE (OUTPATIENT)
Dept: GENERAL RADIOLOGY | Facility: CLINIC | Age: 54
End: 2019-08-15
Attending: ORTHOPAEDIC SURGERY
Payer: COMMERCIAL

## 2019-08-15 VITALS — WEIGHT: 232 LBS | HEIGHT: 66 IN | BODY MASS INDEX: 37.28 KG/M2

## 2019-08-15 DIAGNOSIS — M16.0 PRIMARY OSTEOARTHRITIS OF BOTH HIPS: Primary | ICD-10-CM

## 2019-08-15 DIAGNOSIS — M16.0 PRIMARY OSTEOARTHRITIS OF BOTH HIPS: ICD-10-CM

## 2019-08-15 RX ORDER — GLUCOSAMINE SULFATE 500 MG
1 CAPSULE ORAL AT BEDTIME
COMMUNITY

## 2019-08-15 ASSESSMENT — ENCOUNTER SYMPTOMS
MUSCLE CRAMPS: 1
JOINT SWELLING: 0
MUSCLE WEAKNESS: 1
STIFFNESS: 1
BACK PAIN: 0
NECK PAIN: 0
ARTHRALGIAS: 1
MYALGIAS: 0

## 2019-08-15 ASSESSMENT — HOOS S4: HOW SEVERE IS YOUR HIP JOINT STIFFNESS AFTER FIRST WAKENING IN THE MORNING?: SEVERE

## 2019-08-15 ASSESSMENT — ACTIVITIES OF DAILY LIVING (ADL)
ADL_SUBSCALE_SCORE: 36.76
ADL_MEAN: 2.52
ADL_SUM: 43

## 2019-08-15 ASSESSMENT — MIFFLIN-ST. JEOR: SCORE: 1669.5

## 2019-08-15 NOTE — NURSING NOTE
"Chief Complaint   Patient presents with     RECHECK     6 Month F/u to discuss Bilat. Hip Pain       54 year old  1965    Ht 1.685 m (5' 6.34\")   Wt 105.2 kg (232 lb)   BMI 37.06 kg/m          [id number] - [place], [place] - [address] [facility] [place] [address]    No Known Allergies    Current Outpatient Medications   Medication     Calcium Carbonate (CALCIUM 600 PO)     glucosamine 500 MG CAPS     Current Facility-Administered Medications   Medication     ropivacaine (NAROPIN) injection 2 mL     ropivacaine (NAROPIN) injection 3 mL     ropivacaine (NAROPIN) injection 3 mL     triamcinolone (KENALOG-40) injection 80 mg     triamcinolone (KENALOG-40) injection 80 mg           Questionnaires:    HOOS Hip Dysfunction & Osteoarthritis Outcome Questionnaire    Hip Dysfunction & Osteoarthritis Outcome Score (HOOS), English Version LK 2.0 (Alex Bergeron, Zoe DAVIS, 2003) 8/15/2019   S1. Do you feel grinding, hear clicking or any other type of noise from your hip? Never   S2. Difficulties spreading legs wide apart Severe   S3. Difficulties to stride out when walking Severe   S4. How severe is your hip joint stiffness after first wakening in the morning? Severe   S5. How severe is your hip stiffness after sitting, lying or resting LATER IN THE DAY? Severe   Symptom Count 5   Symptom Sum 12   Symptom Mean 2.4   Symptom Subscale Score 40   P1. How often is your hip painful? Daily   P2. Straightening your hip fully None   P3. Bending your hip FULLY Severe   P4. Walking on a flat surface Moderate   P5. Going up or down stairs Severe   P6. At night while in bed Mild   P7. Sitting or lying None   P8. Standing upright Moderate   P9. Walking on a hard surface (asphalt, concrete, etc.) Severe   P10. Walking on an uneven surface Severe   Pain Count 10   Pain Sum 20   Pain Mean 2   Pain Subscale Score 50   A1. Descending stairs Moderate   A2. Ascending stairs Severe   A3. Rising from " sitting Severe   A4. Standing Severe   A5. Bending to the floor/ an object Severe   A6. Walking on a flat surface Severe   A7. Getting in/out of car Severe   A8. Going shopping Severe   A9. Putting on socks/stockings Severe   A10. Rising from bed Moderate   A11. Taking off socks/stockings Severe   A12. Lying in bed (turning over, maintaining hip position) Moderate   A13. Getting in/out of bed Moderate   A14. Sitting Mild   A15. Getting on/off toilet Mild   A16. Heavy domestic duties (moving heavy boxes, scrubbing floors, etc.) Severe   A17. Light domestic duties (cooking, dusting, etc.) Severe   ADL Count 17   ADL Sum 43   ADL Mean 2.52   ADL Subscale Score 36.76   SP1. Squatting Severe   SP2. Running Severe   SP3. Twisting/pivoting on loaded leg Severe   SP4. Walking on uneven surface Severe   Sports/Rec Count 4   Sports/Rec Sum 12   Sports/Rec Mean 3   Sports/Rec Subscale Score 25   Q1. How often are you aware of your hip problem? Daily   Q2. Have you modified you life style to avoid activities potentially damaging to your hip? Severely   Q3. How much are you troubled with lack of confidence in your hip? Extremely   Q4. In general, how much difficulty do you have with your hip? Severe   QOL Count 4   QOL Sum 13   QOL Mean 3.25   Quality of Life Subscale Score 18.75            Promis 10 Assessment    PROMIS 10 8/15/2019   In general, would you say your health is: Very good   In general, would you say your quality of life is: Good   In general, how would you rate your physical health? Very good   In general, how would you rate your mental health, including your mood and your ability to think? Very good   In general, how would you rate your satisfaction with your social activities and relationships? Very good   In general, please rate how well you carry out your usual social activities and roles Good   To what extent are you able to carry out your everyday physical activities such as walking, climbing stairs,  carrying groceries, or moving a chair? A little   How often have you been bothered by emotional problems such as feeling anxious, depressed or irritable? Never   How would you rate your fatigue on average? Mild   How would you rate your pain on average?   0 = No Pain  to  10 = Worst Imaginable Pain 7   In general, would you say your health is: 4   In general, would you say your quality of life is: 3   In general, how would you rate your physical health? 4   In general, how would you rate your mental health, including your mood and your ability to think? 4   In general, how would you rate your satisfaction with your social activities and relationships? 4   In general, please rate how well you carry out your usual social activities and roles. (This includes activities at home, at work and in your community, and responsibilities as a parent, child, spouse, employee, friend, etc.) 3   To what extent are you able to carry out your everyday physical activities such as walking, climbing stairs, carrying groceries, or moving a chair? 2   In the past 7 days, how often have you been bothered by emotional problems such as feeling anxious, depressed, or irritable? 1   In the past 7 days, how would you rate your fatigue on average? 2   In the past 7 days, how would you rate your pain on average, where 0 means no pain, and 10 means worst imaginable pain? 7   Global Mental Health Score 16   Global Physical Health Score 12   PROMIS TOTAL - SUBSCORES 28   Some recent data might be hidden

## 2019-08-15 NOTE — LETTER
8/15/2019      RE: Licha Birch  [address] [facility] [address]       [facility]  Orthopaedic Surgery, Joint Replacement Consultation  by Rashid Schuster M.D.    Licha Birch MRN# [id number]   Age: 54 year old YOB: 1965     Requesting physician: Referred Self  No Ref-Primary, Physician       Background history:  DX:  1. Bilateral hip OA    TREATMENTS:  1. 1/2019, 7/2019 - US guided corticosteroid injection right hip (Dr. Jay)             Assessment and Plan:   Assessment:  Bilateral hip end-stage osteoarthritis, left worse than right     Plan:  -Today we had a pleasant discussion regarding her bilateral hip arthritis and potential treatment options  -We thoroughly discussed both nonoperative and operative management options.  Nonoperative management would include activity modification, living with her symptoms, anti-inflammatory medications, injections.  Surgical management would include a total hip arthroplasty.  -We discussed possible risks, benefits and alternatives of surgery including but not limited to infection, damage to underlying structures such as nerves, arteries, tendons, dislocation, fracture, implant failure, blood loss and potential risks of general anesthesia including but not limited to stroke, heart attack, blood clots, death.  He verbalized understanding of this..  -At this time the patient would like to pursue an elective total hip arthroplasty on the left side  -We will send her to total joint educational classes.  -Should continue to work on weight loss and smoking cessation  -We will add her to the surgical schedule  -She is in agreement this plan all questions answered    Lexa Simpson MD  Orthopedic Surgery PGY4  [phone]               History of Present Illness:   54 year old adult who has been followed by her primary care Dr. Sapp for known bilateral hip pains.  She was last evaluated on 1/21/2018 by Dr. Schuster, at which time she was provided the  "options of nonoperative and operative management.  She has been hesitant to pursue a hip replacement, and therefore has been attempting to live with her symptoms since then.  However, her symptoms have continued and even worsened since she was last seen.  She feels that her hips are more stiff and has had consistent pain within both the left and right groin that is worsened with weightbearing but is even present with sitting.  She denies fevers, chills, sweats.  She denies significant improvement with over-the-counter anti-inflammatory medications.  She is attempted pain relief with 2 ultrasound-guided corticosteroid injections to the right hip in January and July of this year by Dr. Jay, however this is only provided minor temporary relief.  She therefore presents today to begin a discussion of a potential joint replacement.      Current symptoms:  Problem: Lateral hip pain  Onset and duration: Many months  Awakens from sleep due to sx's:  Yes  Precipitating Injury:  No    Other joints or sites painful:  No               Physical Exam:     EXAMINATION pertinent findings:   VITAL SIGNS: Height 1.685 m (5' 6.34\"), weight 105.2 kg (232 lb).  Body mass index is 37.06 kg/m .  RESP: non labored breathing   ABD: benign   SKIN: grossly normal   LYMPHATIC: grossly normal   NEURO: grossly normal   VASCULAR: satisfactory perfusion of all extremities   MUSCULOSKELETAL:   She has significant limp with obvious stiffness of the left hip.  She does not have overlying skin lesions or prior scars of the hips.  She is otherwise neurovascular intact distally.  She is able to perform a straight leg raise with the right leg without difficulty, however has increased pain and difficulty with straight leg raise of the left side.  Her left hip is rather stiff with passive range of motion to 90 degrees flexion, external rotation to 20 degrees and internal rotation to neutral.  Her left hip is able to range to 110 degrees flexion, " external rotation to 40 degrees and internal rotation to neutral.                  Data:   All laboratory data reviewed  All imaging studies reviewed by me    X-ray pelvis bilateral hips (8/15/19) -personally reviewed today.  Reveals end-stage bilateral femoral acetabular osteoarthritis with an underlying subchondral sclerosis and cystic formation, in addition to osteophytic spurring.         Attending MD (Dr. Rashid Schuster) Attestation :  This patient was seen and evaluated by me including a history, exam, and interpretation of all imaging and/or lab data.  Either a training physician (resident/fellow), who also saw the patient, or scribe has documented the clinic visit in the attached note.    Rashid Schuster MD  [facility] Family Professor  Oncology and Adult Reconstructive Surgery  Dept Orthopaedic Surgery, [facility]  [phone] office, [phone] pager  [email or web address]      DATA for DOCUMENTATION:         Past Medical History:     Patient Active Problem List   Diagnosis     Primary osteoarthritis of left hip     Osteoarthritis of right hip     Past Medical History:   Diagnosis Date     Osteoarthritis of left hip        Also see scanned health assessment forms.       Past Surgical History:     Past Surgical History:   Procedure Laterality Date     KNEE SURGERY              Social History:     Social History     Socioeconomic History     Marital status: Single     Spouse name: Not on file     Number of children: Not on file     Years of education: Not on file     Highest education level: Not on file   Occupational History     Not on file   Social Needs     Financial resource strain: Not on file     Food insecurity:     Worry: Not on file     Inability: Not on file     Transportation needs:     Medical: Not on file     Non-medical: Not on file   Tobacco Use     Smoking status: Current Some Day Smoker     Packs/day: 0.00     Years: 0.00     Pack years: 0.00     Types: Cigarettes     Smokeless tobacco:  Never Used   Substance and Sexual Activity     Alcohol use: Not on file     Drug use: Not on file     Sexual activity: Not on file   Lifestyle     Physical activity:     Days per week: Not on file     Minutes per session: Not on file     Stress: Not on file   Relationships     Social connections:     Talks on phone: Not on file     Gets together: Not on file     Attends Gnosticism service: Not on file     Active member of club or organization: Not on file     Attends meetings of clubs or organizations: Not on file     Relationship status: Not on file     Intimate partner violence:     Fear of current or ex partner: Not on file     Emotionally abused: Not on file     Physically abused: Not on file     Forced sexual activity: Not on file   Other Topics Concern     Not on file   Social History Narrative     Not on file            Family History:       Family History   Problem Relation Age of Onset     Unknown/Adopted Mother      Hypertension Mother      Osteoporosis Mother      Thyroid Disease Mother      Breast Cancer Mother      Hyperlipidemia Mother             Medications:     Current Outpatient Medications   Medication Sig     Calcium Carbonate (CALCIUM 600 PO)      glucosamine 500 MG CAPS      Current Facility-Administered Medications   Medication     ropivacaine (NAROPIN) injection 2 mL     ropivacaine (NAROPIN) injection 3 mL     ropivacaine (NAROPIN) injection 3 mL     triamcinolone (KENALOG-40) injection 80 mg     triamcinolone (KENALOG-40) injection 80 mg              Review of Systems:   A comprehensive 10 point review of systems (constitutional, ENT, cardiac, peripheral vascular, lymphatic, respiratory, GI, , Musculoskeletal, skin, Neurological) was performed and found to be negative except as described in this note.     See intake form completed by patient    Rashid Schuster MD

## 2019-08-15 NOTE — PROGRESS NOTES
[facility]  Orthopaedic Surgery, Joint Replacement Consultation  by Rashid Schuster M.D.    Licha Birch MRN# [id number]   Age: 54 year old YOB: 1965     Requesting physician: Referred Self  No Ref-Primary, Physician       Background history:  DX:  1. Bilateral hip OA    TREATMENTS:  1. 1/2019, 7/2019 - US guided corticosteroid injection right hip (Dr. Jay)             Assessment and Plan:   Assessment:  Bilateral hip end-stage osteoarthritis, left worse than right     Plan:  -Today we had a pleasant discussion regarding her bilateral hip arthritis and potential treatment options  -We thoroughly discussed both nonoperative and operative management options.  Nonoperative management would include activity modification, living with her symptoms, anti-inflammatory medications, injections.  Surgical management would include a total hip arthroplasty.  -We discussed possible risks, benefits and alternatives of surgery including but not limited to infection, damage to underlying structures such as nerves, arteries, tendons, dislocation, fracture, implant failure, blood loss and potential risks of general anesthesia including but not limited to stroke, heart attack, blood clots, death.  He verbalized understanding of this..  -At this time the patient would like to pursue an elective total hip arthroplasty on the left side  -We will send her to total joint educational classes.  -Should continue to work on weight loss and smoking cessation  -We will add her to the surgical schedule  -She is in agreement this plan all questions answered    Lexa Simpson MD  Orthopedic Surgery PGY4  [phone]               History of Present Illness:   54 year old adult who has been followed by her primary care Dr. Sapp for known bilateral hip pains.  She was last evaluated on 1/21/2018 by Dr. Schuster, at which time she was provided the options of nonoperative and operative management.  She has been hesitant  "to pursue a hip replacement, and therefore has been attempting to live with her symptoms since then.  However, her symptoms have continued and even worsened since she was last seen.  She feels that her hips are more stiff and has had consistent pain within both the left and right groin that is worsened with weightbearing but is even present with sitting.  She denies fevers, chills, sweats.  She denies significant improvement with over-the-counter anti-inflammatory medications.  She is attempted pain relief with 2 ultrasound-guided corticosteroid injections to the right hip in January and July of this year by Dr. Jay, however this is only provided minor temporary relief.  She therefore presents today to begin a discussion of a potential joint replacement.      Current symptoms:  Problem: Lateral hip pain  Onset and duration: Many months  Awakens from sleep due to sx's:  Yes  Precipitating Injury:  No    Other joints or sites painful:  No               Physical Exam:     EXAMINATION pertinent findings:   VITAL SIGNS: Height 1.685 m (5' 6.34\"), weight 105.2 kg (232 lb).  Body mass index is 37.06 kg/m .  RESP: non labored breathing   ABD: benign   SKIN: grossly normal   LYMPHATIC: grossly normal   NEURO: grossly normal   VASCULAR: satisfactory perfusion of all extremities   MUSCULOSKELETAL:   She has significant limp with obvious stiffness of the left hip.  She does not have overlying skin lesions or prior scars of the hips.  She is otherwise neurovascular intact distally.  She is able to perform a straight leg raise with the right leg without difficulty, however has increased pain and difficulty with straight leg raise of the left side.  Her left hip is rather stiff with passive range of motion to 90 degrees flexion, external rotation to 20 degrees and internal rotation to neutral.  Her left hip is able to range to 110 degrees flexion, external rotation to 40 degrees and internal rotation to neutral.               "    Data:   All laboratory data reviewed  All imaging studies reviewed by me    X-ray pelvis bilateral hips (8/15/19) -personally reviewed today.  Reveals end-stage bilateral femoral acetabular osteoarthritis with an underlying subchondral sclerosis and cystic formation, in addition to osteophytic spurring.         Attending MD (Dr. Rashid Schuster) Attestation :  This patient was seen and evaluated by me including a history, exam, and interpretation of all imaging and/or lab data.  Either a training physician (resident/fellow), who also saw the patient, or scribe has documented the clinic visit in the attached note.    Rashid Schuster MD [employer] [facility] Professor  Oncology and Adult Reconstructive Surgery  Dept Orthopaedic Surgery, [facility]  [phone] office, [phone] pager  [email or web address]      DATA for DOCUMENTATION:         Past Medical History:     Patient Active Problem List   Diagnosis     Primary osteoarthritis of left hip     Osteoarthritis of right hip     Past Medical History:   Diagnosis Date     Osteoarthritis of left hip        Also see scanned health assessment forms.       Past Surgical History:     Past Surgical History:   Procedure Laterality Date     KNEE SURGERY              Social History:     Social History     Socioeconomic History     Marital status: Single     Spouse name: Not on file     Number of children: Not on file     Years of education: Not on file     Highest education level: Not on file   Occupational History     Not on file   Social Needs     Financial resource strain: Not on file     Food insecurity:     Worry: Not on file     Inability: Not on file     Transportation needs:     Medical: Not on file     Non-medical: Not on file   Tobacco Use     Smoking status: Current Some Day Smoker     Packs/day: 0.00     Years: 0.00     Pack years: 0.00     Types: Cigarettes     Smokeless tobacco: Never Used   Substance and Sexual Activity     Alcohol use: Not on file     Drug  use: Not on file     Sexual activity: Not on file   Lifestyle     Physical activity:     Days per week: Not on file     Minutes per session: Not on file     Stress: Not on file   Relationships     Social connections:     Talks on phone: Not on file     Gets together: Not on file     Attends Yarsani service: Not on file     Active member of club or organization: Not on file     Attends meetings of clubs or organizations: Not on file     Relationship status: Not on file     Intimate partner violence:     Fear of current or ex partner: Not on file     Emotionally abused: Not on file     Physically abused: Not on file     Forced sexual activity: Not on file   Other Topics Concern     Not on file   Social History Narrative     Not on file            Family History:       Family History   Problem Relation Age of Onset     Unknown/Adopted Mother      Hypertension Mother      Osteoporosis Mother      Thyroid Disease Mother      Breast Cancer Mother      Hyperlipidemia Mother             Medications:     Current Outpatient Medications   Medication Sig     Calcium Carbonate (CALCIUM 600 PO)      glucosamine 500 MG CAPS      Current Facility-Administered Medications   Medication     ropivacaine (NAROPIN) injection 2 mL     ropivacaine (NAROPIN) injection 3 mL     ropivacaine (NAROPIN) injection 3 mL     triamcinolone (KENALOG-40) injection 80 mg     triamcinolone (KENALOG-40) injection 80 mg              Review of Systems:   A comprehensive 10 point review of systems (constitutional, ENT, cardiac, peripheral vascular, lymphatic, respiratory, GI, , Musculoskeletal, skin, Neurological) was performed and found to be negative except as described in this note.     See intake form completed by patient    Attending MD (Dr. Rashid Schuster) Attestation :  This patient was seen and evaluated by me including a history, exam, and interpretation of all imaging and/or lab data.  Either a training physician (resident/fellow), who also saw  the patient, or scribe has documented the visit in the attached note.    Rashid Schuster MD [facility]  Oncology and Adult Reconstructive Surgery  Dept Orthopaedic Surgery, [facility]  [phone] office, [phone] pager  [email or web address]

## 2019-09-10 DIAGNOSIS — M16.9 OSTEOARTHRITIS OF HIP: Primary | ICD-10-CM

## 2019-09-11 DIAGNOSIS — M16.9 OSTEOARTHRITIS OF HIP: Primary | ICD-10-CM

## 2019-10-07 ENCOUNTER — PRE VISIT (OUTPATIENT)
Dept: SURGERY | Facility: CLINIC | Age: 54
End: 2019-10-07

## 2019-10-07 NOTE — TELEPHONE ENCOUNTER
FUTURE VISIT INFORMATION      SURGERY INFORMATION:    Date: 10/25/19    Location: [facility] OR    Surgeon:  Rashid Schuster    Anesthesia Type:  General    RECORDS REQUESTED FROM:       Primary Care Provider: Left message for patient asking where her records would be- per patient- doesn't have a primary MD and hasn't had any testing done

## 2019-10-11 ENCOUNTER — ANESTHESIA EVENT (OUTPATIENT)
Dept: SURGERY | Facility: CLINIC | Age: 54
DRG: 470 | End: 2019-10-11
Payer: COMMERCIAL

## 2019-10-11 ENCOUNTER — OFFICE VISIT (OUTPATIENT)
Dept: SURGERY | Facility: CLINIC | Age: 54
End: 2019-10-11
Payer: COMMERCIAL

## 2019-10-11 VITALS
HEIGHT: 67 IN | OXYGEN SATURATION: 98 % | TEMPERATURE: 98.1 F | RESPIRATION RATE: 19 BRPM | HEART RATE: 79 BPM | DIASTOLIC BLOOD PRESSURE: 114 MMHG | WEIGHT: 236.2 LBS | BODY MASS INDEX: 37.07 KG/M2 | SYSTOLIC BLOOD PRESSURE: 178 MMHG

## 2019-10-11 DIAGNOSIS — M16.12 PRIMARY OSTEOARTHRITIS OF LEFT HIP: ICD-10-CM

## 2019-10-11 DIAGNOSIS — Z01.818 PRE-OP EXAMINATION: Primary | ICD-10-CM

## 2019-10-11 LAB
ALBUMIN UR-MCNC: NEGATIVE MG/DL
ANION GAP SERPL CALCULATED.3IONS-SCNC: 4 MMOL/L (ref 3–14)
APPEARANCE UR: CLEAR
BASOPHILS # BLD AUTO: 0.1 10E9/L (ref 0–0.2)
BASOPHILS NFR BLD AUTO: 0.7 %
BILIRUB UR QL STRIP: NEGATIVE
BUN SERPL-MCNC: 22 MG/DL (ref 7–30)
CALCIUM SERPL-MCNC: 9.2 MG/DL (ref 8.5–10.1)
CHLORIDE SERPL-SCNC: 106 MMOL/L (ref 94–109)
CO2 SERPL-SCNC: 28 MMOL/L (ref 20–32)
COLOR UR AUTO: YELLOW
CREAT SERPL-MCNC: 0.71 MG/DL (ref 0.52–1.04)
DIFFERENTIAL METHOD BLD: NORMAL
EOSINOPHIL # BLD AUTO: 0.1 10E9/L (ref 0–0.7)
EOSINOPHIL NFR BLD AUTO: 1.1 %
ERYTHROCYTE [DISTWIDTH] IN BLOOD BY AUTOMATED COUNT: 13.8 % (ref 10–15)
GFR SERPL CREATININE-BSD FRML MDRD: >90 ML/MIN/{1.73_M2}
GLUCOSE SERPL-MCNC: 101 MG/DL (ref 70–99)
GLUCOSE UR STRIP-MCNC: NEGATIVE MG/DL
HBA1C MFR BLD: 5.8 % (ref 0–5.6)
HCT VFR BLD AUTO: 42.7 % (ref 35–47)
HGB BLD-MCNC: 13.8 G/DL (ref 11.7–15.7)
HGB UR QL STRIP: ABNORMAL
IMM GRANULOCYTES # BLD: 0 10E9/L (ref 0–0.4)
IMM GRANULOCYTES NFR BLD: 0.3 %
KETONES UR STRIP-MCNC: NEGATIVE MG/DL
LEUKOCYTE ESTERASE UR QL STRIP: NEGATIVE
LYMPHOCYTES # BLD AUTO: 2.3 10E9/L (ref 0.8–5.3)
LYMPHOCYTES NFR BLD AUTO: 30.6 %
MCH RBC QN AUTO: 31.7 PG (ref 26.5–33)
MCHC RBC AUTO-ENTMCNC: 32.3 G/DL (ref 31.5–36.5)
MCV RBC AUTO: 98 FL (ref 78–100)
MONOCYTES # BLD AUTO: 0.6 10E9/L (ref 0–1.3)
MONOCYTES NFR BLD AUTO: 8.1 %
MRSA DNA SPEC QL NAA+PROBE: NEGATIVE
MUCOUS THREADS #/AREA URNS LPF: PRESENT /LPF
NEUTROPHILS # BLD AUTO: 4.4 10E9/L (ref 1.6–8.3)
NEUTROPHILS NFR BLD AUTO: 59.2 %
NITRATE UR QL: NEGATIVE
NRBC # BLD AUTO: 0 10*3/UL
NRBC BLD AUTO-RTO: 0 /100
PH UR STRIP: 6 PH (ref 5–7)
PLATELET # BLD AUTO: 250 10E9/L (ref 150–450)
POTASSIUM SERPL-SCNC: 4.4 MMOL/L (ref 3.4–5.3)
RBC # BLD AUTO: 4.35 10E12/L (ref 3.8–5.2)
RBC #/AREA URNS AUTO: 3 /HPF (ref 0–2)
SODIUM SERPL-SCNC: 138 MMOL/L (ref 133–144)
SOURCE: ABNORMAL
SP GR UR STRIP: 1.01 (ref 1–1.03)
SPECIMEN SOURCE: NORMAL
SQUAMOUS #/AREA URNS AUTO: <1 /HPF (ref 0–1)
UROBILINOGEN UR STRIP-MCNC: 0 MG/DL (ref 0–2)
WBC # BLD AUTO: 7.4 10E9/L (ref 4–11)
WBC #/AREA URNS AUTO: <1 /HPF (ref 0–5)

## 2019-10-11 RX ORDER — POLYETHYLENE GLYCOL 3350 17 G
2 POWDER IN PACKET (EA) ORAL 3 TIMES DAILY
COMMUNITY
End: 2020-12-03

## 2019-10-11 RX ORDER — COVID-19 ANTIGEN TEST
440 KIT MISCELLANEOUS
COMMUNITY
End: 2020-12-03

## 2019-10-11 RX ORDER — ACETAMINOPHEN 325 MG/1
650 TABLET ORAL AT BEDTIME
COMMUNITY
End: 2023-06-02

## 2019-10-11 RX ORDER — OMEGA-3 FATTY ACIDS/FISH OIL 300-1000MG
200 CAPSULE ORAL 3 TIMES DAILY
COMMUNITY
End: 2023-06-02

## 2019-10-11 ASSESSMENT — PAIN SCALES - GENERAL: PAINLEVEL: SEVERE PAIN (6)

## 2019-10-11 ASSESSMENT — MIFFLIN-ST. JEOR: SCORE: 1699.03

## 2019-10-11 ASSESSMENT — LIFESTYLE VARIABLES: TOBACCO_USE: 1

## 2019-10-11 NOTE — ANESTHESIA PREPROCEDURE EVALUATION
Anesthesia Pre-Procedure Evaluation    Patient: Licha Birch   MRN:     [id number] Gender:   adult   Age:    54 year old :      1965        Preoperative Diagnosis: Osteoarthritis Left Hip   Procedure(s):  Total Left Hip Arthroplasty     Past Medical History:   Diagnosis Date     Osteoarthritis of left hip       Past Surgical History:   Procedure Laterality Date     KNEE SURGERY            Anesthesia Evaluation     . Pt has had prior anesthetic. Type: General    No history of anesthetic complications          ROS/MED HX    ENT/Pulmonary: Comment: Right-sided mandible reconstruction for degenerative disease.     (+)CAPO risk factors obese, other ENT- s/p tonsilectomy, tobacco use (Quit two weeks ago.  Smoked 35 years on and off. ), Past use 0.5 packs/day  , . .    Neurologic:  - neg neurologic ROS     Cardiovascular: Comment: Denies chest pain, SOB, palpitations, syncope, SCOTT, orthopnea, or PND. - neg cardiovascular ROS   (+) ----. : . . . :. . No previous cardiac testing       METS/Exercise Tolerance: Comment: Walks into work two blocks five days a week.  >4 METS   Hematologic:  - neg hematologic  ROS       Musculoskeletal: Comment: Left knee surgery while in high school.        GI/Hepatic:  - neg GI/hepatic ROS       Renal/Genitourinary:  - ROS Renal section negative       Endo:     (+) Obesity, .      Psychiatric:  - neg psychiatric ROS       Infectious Disease:  - neg infectious disease ROS       Malignancy:      - no malignancy   Other: Comment: S/p tubal libation.    (+) No chance of pregnancy C-spine cleared: Yes, H/O Chronic Pain,no other significant disability                        PHYSICAL EXAM:   Mental Status/Neuro: A/A/O   Airway: Facies: Thick Neck (Small mouth but am able to get 3 FB. )  Mallampati: III  Mouth/Opening: Full  TM distance: < 6 cm  Neck ROM: Full   Respiratory: Auscultation: CTAB     Resp. Rate: Normal     Resp. Effort: Normal      CV: Rhythm: Regular  Rate: Age appropriate  Heart:  "Normal Sounds  Edema: None   Comments: Right back molar missing.      Dental: Details                LABS:  CBC:   Lab Results   Component Value Date    WBC 11.4 (H) 01/02/2019    HGB 14.4 01/02/2019    HCT 44.5 01/02/2019     01/02/2019     BMP:   Lab Results   Component Value Date     01/02/2019    POTASSIUM 4.1 01/02/2019    CHLORIDE 106 01/02/2019    CO2 28 01/02/2019    BUN 20 01/21/2019    BUN 21 01/02/2019    CR 0.87 01/21/2019    CR 0.85 01/02/2019     (H) 01/02/2019     COAGS: No results found for: PTT, INR, FIBR  POC: No results found for: BGM, HCG, HCGS  OTHER:   Lab Results   Component Value Date    NATA 9.1 01/21/2019    PHOS 3.5 01/21/2019    MAG 2.1 01/21/2019    ALKPHOS 120 01/21/2019    TSH 3.12 01/21/2019    CRP 3.8 01/02/2019    SED 10 01/02/2019        Preop Vitals    BP Readings from Last 3 Encounters:   No data found for BP    Pulse Readings from Last 3 Encounters:   No data found for Pulse      Resp Readings from Last 3 Encounters:   01/11/19 16   01/09/19 18   01/02/19 16    SpO2 Readings from Last 3 Encounters:   No data found for SpO2      Temp Readings from Last 1 Encounters:   No data found for Temp    Ht Readings from Last 1 Encounters:   08/15/19 1.685 m (5' 6.34\")      Wt Readings from Last 1 Encounters:   08/15/19 105.2 kg (232 lb)    Estimated body mass index is 37.06 kg/m  as calculated from the following:    Height as of 8/15/19: 1.685 m (5' 6.34\").    Weight as of 8/15/19: 105.2 kg (232 lb).     LDA:        MARK FV AN PLAN NO PONV RULE       PAC Discussion and Assessment    ASA Classification: 1  Case is suitable for: [facility]  Anesthetic techniques and relevant risks discussed: GA  Invasive monitoring and risk discussed:   Types:   Possibility and Risk of blood transfusion discussed:   NPO instructions given:   Additional anesthetic preparation and risks discussed:   Needs early admission to pre-op area:   Other:     PAC Resident/NP Anesthesia Assessment:  Licha" Eyal is a 54-year-old female scheduled for Total Left Hip Arthroplasty on 10/25/19 with Dr. Schuster at [facility] under general anesthesia.  Ms. Valenzuela was seen by Dr. Schuster in orthopedic consultation on 1/21/19 for complaints of bilateral hip pain.  Records indicate that she was found to have severe osteoarthritis of bilateral hips.  The left side was found to be worse with being end-stage in nature with complete loss of cartilage thickness and subchondral cyst formation.  At that visit, she reported her pain to be minimal despite the severe nature of her osteoarthritis.  She had been managing the pain with non-operative measures including steroid injections, chiropractic care and massage therapy.  Surgery was not indicated at that time; however, Dr. Schuster's note indicated that he suspects her symptoms will become progressively worse and she will wish to consider a left hip replacement surgery.    Ms. Birch followed up with Dr. Schuster on 8/15/19 as left hip symptoms has worsened.  Through the evaluation, Dr. Schuster recommended the above surgical intervention.   Ms. Cárdenas opted to proceed.      Procedures  EXAM: XR PELVIS AND HIP BILATERAL 1 VIEW  8/15/2019 2:27 PM       HISTORY: Primary osteoarthritis of both hips     COMPARISON: 1/2/2019     FINDINGS: Supine AP bilateral hips, frog leg lateral views of the  right and left hip.     No acute osseous abnormality. Severe degenerative changes of the hips,  progressed on the right. Partial collapse of the left superior femoral  head, not substantially changed.     Soft tissues unremarkable.                                                                       IMPRESSION: Severe degenerative changes of the hips. Right side  appears mildly progressed compared to 1/2/2019.     ADAM SRIVASTAVA MD (Joe)    She has the following specific operative considerations:   - METS:  >4. RCRI : No serious cardiac risks.  0.4 % risk of major adverse cardiac event.  -  CAPO # of risks 2/8 = low  - VTE risk:  0.26%  - Risk of PONV score = 2-3.  If > 2, anti-emetic intervention recommended.      #  Cardiology - denies known coronary artery disease.  Denies cardiac symptoms.    - Blood pressure elevated at today's exam.  Recommended checking over the next two weeks and if remains >140/90 to f/u with PCP for further evaluation and treatment.         - No aspirin  #  Pulmonary - Quit smoking two weeks ago using nicotine replacement. Smoked on and off over the past 35 years <0.5 PPD.  #  Endocrine - Obesity: Recommend careful positioning to prevent airway/ventilatory compromise, or tissue injury.    #  Musculoskeletal -  Symptomatic severe osteoarthritis of bilateral hips with left side end-stage, above surgery planned. No narcotic use.   #  HEENT - Remote h/o right mandible surgery.    - Anesthesia considerations:  Small mouth, MP III, TM <3FB.  Potential for difficult airway.  Have difficult airway cart available.  Refer to PAC assessment in anesthesia records  - ERAS protocol  - CBC, BMP, UA, MRSA swab, HgbA1C and T&S today.     Arrival time, NPO, shower and medication instructions provided by nursing staff today.  Preparing For Your Surgery handout given.      ADDENDUM 10/22/19:  Patient called and was seen by PCP for hypertension on 10/14/19.  She was started on Lisinopril/HCTZ 20/12.5 mg one tablet daily.  She followed up yesterday, 10/21/19, and /108.  PCP increased Lisinopril/HCTZ 20/12.5 mg to two tablets daily and added amlodipine 5 mg one tablet daily.  She will follow-up with her PCP on Thursday, 10/24/19, for BP recheck and lab work.  I have asked her to have her PCP fax the lab results to [phone].  Her PCP is at [facility] [place], operated by [facility] and is not on Care Everywhere.  She is taking her medications at bedtime so will take both of her blood pressure pills the night before surgery. Questions answered and verbalizes an understanding re: medications.     Reviewed and  Signed by PAC Mid-Level Provider/Resident  Mid-Level Provider/Resident: Radha ROSENTHAL CNP  Date: 10/11/2019  Time: 8:17    Attending Anesthesiologist Anesthesia Assessment:        Anesthesiologist:   Date:   Time:   Pass/Fail:   Disposition:     PAC Pharmacist Assessment:        Pharmacist:   Date:   Time:    Javier Allen MD

## 2019-10-11 NOTE — PATIENT INSTRUCTIONS
Preparing for Your Surgery      Name:  Licha Birch   MRN:  [id number]   :  1965   Today's Date:  10/11/2019     Arriving for surgery:  Surgery date:  10/25/19  Arrival time:  8 am    Please come to:     [facility] [address]  [address] [place]  [address]    - parking is available in front of [facility] from 5:15AM to 8:00PM. If you prefer, park your car in the Green Lot.    -Proceed to the 3rd floor, check in at the Adult Surgery Waiting Lounge. [phone]    If an escort is needed stop at the Information Desk in the lobby. Inform the information person that you are here for surgery. An escort to the Adult Surgery Waiting Lounge will be provided.    -  Bring your ID and insurance card.    - If you are scheduled to go home the Same Day as surgery you must have a responsible adult as a  and to stay with you overnight the first 24 hours after surgery.     Enhanced Recovery After Surgery     This is a team effort, including you, to get you back on your feet, eating and drinking normally and out of the hospital as quickly as possible.  The goals are: 1) NO INFECTIONS and   2) RETURN TO NORMAL DIET    How can we achieve these goals?  1) STAY ACTIVE: Walk every day before your surgery; try to increase the amount every day.  Walk after surgery as much as you can-the nurses will help you.  Walking speeds healing and gets you home quicker, you heal better at home and have less risk of infection.     2) INCENTIVE SPIROMETER:  -  Begin using Incentive Spirometer 1 week prior to surgery.  Use 4 times per day, up to 5-10 breaths each time.  Bring Incentive Spirometer to hospital.  Using the incentive spirometer can strengthen your muscles between your ribs and help you have a strong cough after surgery.  A more effective cough can help prevent problems with your lungs.    3) STAY HYDRATED: Drink clear liquids up until 2 hours before your surgery. We  would like you to purchase a drink such as Gatorade or Ensure Clear (not the milkshake type).  Drink this before bedtime and on the way into the hospital, drink between 8-10 ounces or until you feel hydrated.  Keeping well hydrated leads to your veins being plump, you wake up faster, and you are less likely to be nauseated. Start drinking water as soon as you can after surgery and advance to clear liquids and food as tolerated.  IV fluids contain salt, drinking fluids will minimize the amount of IV fluids you need and decrease the amount of salt you get.    The most common reason for the patient to be readmitted is dehydration. Staying hydrated after you go home from the hospital is very important.  Ensure or Ensure Clear are good options to keep you hydrated.     4) PAIN MANAGEMENT: If we minimize the amount of opioids and narcotics, and use regional blocks (which numb the area where your surgery is) along with oral pain medications; you will have less side effects of nausea and constipation. Narcotics can slow down your bowels and cause you to stay in the hospital longer.     Our goal is to keep you comfortable; eating and drinking normally and back home safely.   What can I eat or drink?  -  You may have solid food or milk products until 8 hours prior to your surgery. (Until 2:30 am )  -  You may have water, apple juice or 7up/Sprite until 2 hours prior to your surgery. (Until 8 am- Stop on arrival to hospital )    Which medicines can I take?       -  Do not bring your own medications to the hospital.        -  Follow Orthopedic Clinic instructions regarding Ibuprofen. If no instructions given, NO Ibuprofen the day prior to surgery.          -  Hold Naproxen (Aleve) 2 days prior to surgery.       -  Hold Glucosamine with Turmeric for 7 days prior to surgery. Last dose 10-17-19.    -  Do NOT take these medications in the morning, the day of surgery:  Hemp Seed cream    -  Please take these medications the morning  of surgery:  Acetaminophen (Tylenol) if needed    How do I prepare myself?  -  Take two showers: one the night before surgery; and one the morning of surgery.         Use Scrubcare or Hibiclens to wash from neck down.  You may use your own shampoo and conditioner. No other hair products.   -  Do NOT use lotion, powder, colognes, deodorant, or antiperspirant the day of your surgery.  -  Do NOT wear any makeup, fingernail polish or jewelry.    Questions or Concerns:  If you have questions or concerns prior to your surgery, call [phone]. (Mon - Fri   8 am- 5:30 pm)  Questions about surgery, contact your Surgeons office.      AFTER YOUR SURGERY  Breathing exercises   Breathing exercises help you recover faster. Take deep breaths and let the air out slowly. This will:     Help you wake up after surgery.    Help prevent complications like pneumonia.  Preventing complications will help you go home sooner.   Nausea and vomiting   You may feel sick to your stomach after surgery; if so, let your nurse know.    Pain control:  After surgery, you may have pain. Our goal is to help you manage your pain. Pain medicine will help you feel comfortable enough to do activities that will help you heal.  These activities may include breathing exercises, walking and physical therapy.   To help your health care team treat your pain we will ask: 1) If you have pain  2) where it is located 3) describe your pain in your words  Methods of pain control include medications given by mouth, vein or by nerve block for some surgeries.  We may give you a pain control pump that will:  1) Deliver the medicine through a tube placed in your vein  2) Control the amount of medicine you receive  3) Allow you to push a button to deliver a dose of pain medicine  Sequential Compression Device (SCD) or Pneumo Boots:  You may need to wear SCD S on your legs or feet. These are wraps connected to a machine that pumps in air and releases it. The repeated  pumping helps prevent blood clots from forming.   Orthopedic request for AM ADMIT Patients:   If you are a patient staying for more than one night in the hospital, your caregiver should plan to arrive by 8:30am on the day you are going home. This will ensure your caregiver/ will be present for your morning cares, as well as be a part of your discharge conversation. Oftentimes, our morning discharges leave before lunch.    Using an Incentive Spirometer    An incentive spirometer is a device that helps you do deep breathing exercises. These exercises expand your lungs, aid in circulation, and help prevent pneumonia. Deep breathing exercises also help you breathe better and improve the function of your lungs by:    Keeping your lungs clear    Strengthening your breathing muscles    Helping prevent respiratory complications or problems  The incentive spirometer gives you a way to take an active part in your care. A nurse or therapist will teach you breathing exercises. To do these exercises, you will breathe in through your mouth and not your nose. The incentive spirometer only works correctly if you breathe in through your mouth.    Steps to clear lungs  Step 1. Exhale normally. Then, inhale normally.    Relax and breathe out.  Step 2. Place your lips tightly around the mouthpiece.    Make sure the device is upright and not tilted.  Step 3. Inhale as much air as you can through the mouthpiece (don't breath through your nose).    Inhale slowly and deeply.    Hold your breath long enough to keep the balls or disk raised for at least 3 to 5 seconds, or as instructed by your healthcare provider.  Step 4. Repeat the exercise regularly.

## 2019-10-11 NOTE — H&P
Pre-Operative H & P     CC:  Preoperative exam to assess for increased cardiopulmonary risk while undergoing surgery and anesthesia.    Date of Encounter: 10/11/2019  Primary Care Physician:  No primary care provider on file.  Reason:  End-stage osteoarthritis    HPI  Licha Birch is a 54 year old adult who presents for pre-operative H & P in preparation for Total Left Hip Arthroplasty on 10/25/19 with Dr. Schuster at [facility] under general anesthesia.  Ms. Valenzuela was seen by Dr. Schuster in orthopedic consultation on 1/21/19 for complaints of bilateral hip pain.  Records indicate that she was found to have severe osteoarthritis of bilateral hips.  The left side was found to be worse with being end-stage in nature with complete loss of cartilage thickness and subchondral cyst formation.  At that visit, she reported her pain to be minimal despite the severe nature of her osteoarthritis.  She had been managing the pain with non-operative measures including steroid injections, chiropractic care and massage therapy.  Surgery was not indicated at that time; however, Dr. Schuster's note indicated that he suspects her symptoms will become progressively worse and she will wish to consider a left hip replacement surgery.    Ms. Birch followed up with Dr. Schuster on 8/15/19 as left hip pain symptoms have worsened. She is using the assistance of a walking stick.  Through the evaluation, Dr. Schuster recommended the above surgical intervention.   Ms. Cárdenas opted to proceed.     History is obtained from the patient and electronic health record.     Past Medical History  Past Medical History:   Diagnosis Date     Osteoarthritis of left hip        Past Surgical History  Past Surgical History:   Procedure Laterality Date     ENT SURGERY      right mandible surgery     KNEE SURGERY       TONSILLECTOMY      as a child       Hx of Blood transfusions/reactions: denies     Hx of abnormal bleeding or anti-platelet use:  denies    Menstrual history: No LMP recorded. Patient is postmenopausal.    Steroid use in the last year: denies oral steroids    Personal or FH with difficulty with Anesthesia:  denies    Prior to Admission Medications  Current Outpatient Medications   Medication Sig Dispense Refill     acetaminophen (TYLENOL) 325 MG tablet Take 650 mg by mouth At Bedtime       glucosamine 500 MG CAPS Take 1 tablet by mouth At Bedtime Glucosamine with tumeric       ibuprofen (ADVIL/MOTRIN) 200 MG capsule Take 200 mg by mouth 3 times daily Pt. Alternates with Aleve every morning. Last dose 10/09/2019. 10/11/2019       naproxen sodium 220 MG capsule Take 440 mg by mouth three times a week Pt. Alternates with Advil every morning. Pt. Took 10/11/2019       NEW MED Apply topically 2 times daily Hemp seed cream, used in the morning and bedtime.      Combination of Irene Japonica Seed oil, hemp oil, eucalyptus oil, peppermint, Boswella Extract, grapefruit, licorice, aloe vera, tumeric       nicotine (COMMIT) 2 MG lozenge Place 2 mg inside cheek 3 times daily         Allergies  No Known Allergies    Social History  Social History     Socioeconomic History     Marital status: Single     Spouse name: Not on file     Number of children: Not on file     Years of education: Not on file     Highest education level: Not on file   Occupational History     Not on file   Social Needs     Financial resource strain: Not on file     Food insecurity:     Worry: Not on file     Inability: Not on file     Transportation needs:     Medical: Not on file     Non-medical: Not on file   Tobacco Use     Smoking status: Former Smoker     Packs/day: 0.50     Years: 35.00     Pack years: 17.50     Types: Cigarettes     Last attempt to quit: 10/1/2019     Years since quittin.0     Smokeless tobacco: Never Used     Tobacco comment: Has smoked on and off over the 35 years    Substance and Sexual Activity     Alcohol use: Yes     Comment: Socially     Drug use:  Never     Sexual activity: Not on file   Lifestyle     Physical activity:     Days per week: Not on file     Minutes per session: Not on file     Stress: Not on file   Relationships     Social connections:     Talks on phone: Not on file     Gets together: Not on file     Attends Hindu service: Not on file     Active member of club or organization: Not on file     Attends meetings of clubs or organizations: Not on file     Relationship status: Not on file     Intimate partner violence:     Fear of current or ex partner: Not on file     Emotionally abused: Not on file     Physically abused: Not on file     Forced sexual activity: Not on file   Other Topics Concern     Not on file   Social History Narrative     Not on file       Family History  Family History   Problem Relation Age of Onset     Unknown/Adopted Mother      Hypertension Mother      Osteoporosis Mother      Thyroid Disease Mother      Breast Cancer Mother      Hyperlipidemia Mother      Atrial fibrillation Mother      Myocardial Infarction Father            ROS/MED HX    ENT/Pulmonary: Comment: Right-sided mandible reconstruction for degenerative disease.     (+)CAPO risk factors obese, other ENT- s/p tonsilectomy, tobacco use (Quit two weeks ago.  Smoked 35 years on and off. ), Past use 0.5 packs/day  , . .    Neurologic:  - neg neurologic ROS     Cardiovascular: Comment: Denies chest pain, SOB, palpitations, syncope, SCOTT, orthopnea, or PND. - neg cardiovascular ROS   (+) ----. : . . . :. . No previous cardiac testing       METS/Exercise Tolerance: Comment: Walks into work two blocks five days a week.  >4 METS   Hematologic:  - neg hematologic  ROS       Musculoskeletal: Comment: Left knee surgery while in high school.        GI/Hepatic:  - neg GI/hepatic ROS       Renal/Genitourinary:  - ROS Renal section negative       Endo:     (+) Obesity, .      Psychiatric:  - neg psychiatric ROS       Infectious Disease:  - neg infectious disease ROS      "  Malignancy:      - no malignancy   Other: Comment: S/p tubal libation.    (+) No chance of pregnancy C-spine cleared: Yes, H/O Chronic Pain,no other significant disability               Temp: 98.1  F (36.7  C) Temp src: Oral BP: (!) 147/79 Pulse: 79   Resp: 19 SpO2: 98 %         236 lbs 3.2 oz  5' 7\"   Body mass index is 36.99 kg/m .       Physical Exam  Constitutional: Awake, alert, cooperative, no apparent distress, and appears stated age.  Eyes: Pupils equal, round and reactive to light, extra ocular muscles intact, sclera clear, conjunctiva normal.  HENT: Normocephalic, oral pharynx with moist mucus membranes, dentition in fair repair with right back molar missing.  MP III, thick neck, TM <3FB and small mouth  No goiter appreciated.   Respiratory: Clear to auscultation bilaterally, no crackles or wheezing.  Cardiovascular: Regular rate and rhythm, normal S1 and S2, and no murmur noted.  Carotids +2, no bruits. No edema. Palpable pulses to radial  DP and PT arteries.   GI: Normal bowel sounds, soft and non-tender. Unable to adequately assess for hepatosplenomegaly given obese abdomen. No superficial masses noted.   Lymph/Hematologic: No cervical lymphadenopathy and no supraclavicular lymphadenopathy.  Genitourinary:  deferred  Skin: Warm and dry.  No rashes at anticipated surgical site.   Musculoskeletal: Full ROM of neck. There is no redness, warmth, or swelling of the joints.   Neurologic: Awake, alert, oriented to name, place and time. Cranial nerves II-XII are grossly intact. antalgic gait.   Neuropsychiatric: Calm, cooperative. Normal affect.     Labs: (personally reviewed)  Lab Results   Component Value Date    WBC 7.4 10/11/2019     Lab Results   Component Value Date    RBC 4.35 10/11/2019     Lab Results   Component Value Date    HGB 13.8 10/11/2019     Lab Results   Component Value Date    HCT 42.7 10/11/2019     Lab Results   Component Value Date    MCV 98 10/11/2019     Lab Results   Component Value " Date    MCH 31.7 10/11/2019     Lab Results   Component Value Date    MCHC 32.3 10/11/2019     Lab Results   Component Value Date    RDW 13.8 10/11/2019     Lab Results   Component Value Date     10/11/2019     Last Comprehensive Metabolic Panel:  Sodium   Date Value Ref Range Status   10/11/2019 138 133 - 144 mmol/L Final     Potassium   Date Value Ref Range Status   10/11/2019 4.4 3.4 - 5.3 mmol/L Final     Chloride   Date Value Ref Range Status   10/11/2019 106 94 - 109 mmol/L Final     Carbon Dioxide   Date Value Ref Range Status   10/11/2019 28 20 - 32 mmol/L Final     Anion Gap   Date Value Ref Range Status   10/11/2019 4 3 - 14 mmol/L Final     Glucose   Date Value Ref Range Status   10/11/2019 101 (H) 70 - 99 mg/dL Final     Urea Nitrogen   Date Value Ref Range Status   10/11/2019 22 7 - 30 mg/dL Final     Creatinine   Date Value Ref Range Status   10/11/2019 0.71 0.52 - 1.04 mg/dL Final     GFR Estimate   Date Value Ref Range Status   10/11/2019 >90 >60 mL/min/[1.73_m2] Final     Comment:     Non  GFR Calc  Starting 12/18/2018, serum creatinine based estimated GFR (eGFR) will be   calculated using the Chronic Kidney Disease Epidemiology Collaboration   (CKD-EPI) equation.       Calcium   Date Value Ref Range Status   10/11/2019 9.2 8.5 - 10.1 mg/dL Final     UA RESULTS:  Recent Labs   Lab Test 10/11/19  0947   COLOR Yellow   APPEARANCE Clear   URINEGLC Negative   URINEBILI Negative   URINEKETONE Negative   SG 1.013   UBLD Small*   URINEPH 6.0   PROTEIN Negative   NITRITE Negative   LEUKEST Negative   RBCU 3*   WBCU <1       Procedures  EXAM: XR PELVIS AND HIP BILATERAL 1 VIEW  8/15/2019 2:27 PM       HISTORY: Primary osteoarthritis of both hips     COMPARISON: 1/2/2019     FINDINGS: Supine AP bilateral hips, frog leg lateral views of the  right and left hip.     No acute osseous abnormality. Severe degenerative changes of the hips,  progressed on the right. Partial collapse of the  left superior femoral  head, not substantially changed.     Soft tissues unremarkable.                                                                       IMPRESSION: Severe degenerative changes of the hips. Right side  appears mildly progressed compared to 1/2/2019.     ADAM SRIVASTAVA MD (Joe)      ASSESSMENT and PLAN  Licha Birch is a 54 year old adult scheduled to undergo  Total Left Hip Arthroplasty on 10/25/19 with Dr. Schuster at [facility] under general anesthesia.     She has the following specific operative considerations:   - METS:  >4. RCRI : No serious cardiac risks.  0.4 % risk of major adverse cardiac event.  - CAPO # of risks 2/8 = low  - VTE risk:  0.26%  - Risk of PONV score = 2-3.  If > 2, anti-emetic intervention recommended.      #  Cardiology - denies known coronary artery disease.  Denies cardiac symptoms.    - Blood pressure elevated at today's exam.  Recommended checking over the next week and if remains >140/90 to f/u with PCP for further evaluation and treatment.         - No aspirin  #  Pulmonary - Quit smoking two weeks ago using nicotine replacement. Smoked on and off over the past 35 years <0.5 PPD.  #  Endocrine - Obesity: Recommend careful positioning to prevent airway/ventilatory compromise, or tissue injury.    #  Musculoskeletal -  Symptomatic severe osteoarthritis of bilateral hips with left side end-stage, above surgery planned. No narcotic use.   #  HEENT - Remote h/o right mandible surgery.    - Anesthesia considerations:  Small mouth, MP III, TM <3FB.  Potential for difficult airway.  Have difficult airway cart available.  Refer to PAC assessment in anesthesia records  - ERAS protocol  - CBC, BMP, UA, MRSA swab, HgbA1C and T&S today.     Arrival time, NPO, shower and medication instructions provided by nursing staff today.  Preparing For Your Surgery handout given.      ADDENDUM 10/22/19:  Patient called and was seen by PCP for hypertension on 10/14/19.  She was  started on Lisinopril/HCTZ 20/12.5 mg one tablet daily.  She followed up yesterday, 10/21/19, and /108.  PCP increased Lisinopril/HCTZ 20/12.5 mg to two tablets daily and added amlodipine 5 mg one tablet daily.  She will follow-up with her PCP on Thursday, 10/24/19, for BP recheck and lab work.  I have asked her to have her PCP fax the lab results to [phone].  Her PCP is at [facility] and is not on Care Everywhere.  She is taking her medications at bedtime so will take both of her blood pressure medications the night before surgery. Questions answered and verbalizes an understanding re: medications.     GALO Gallo CNP  Preoperative Assessment Center  [facility]  Clinic and Surgery Center  Phone: [phone]  Fax: [phone]

## 2019-10-14 ENCOUNTER — TELEPHONE (OUTPATIENT)
Dept: SURGERY | Facility: CLINIC | Age: 54
End: 2019-10-14

## 2019-10-14 NOTE — TELEPHONE ENCOUNTER
10/14/2019    Called Pt. Regarding her elevated A1C in her pre-op assessment.  Left message telling Pt. She needed to follow up with her PCP in the next 6 months to have this rechecked and in the mean time modifer her diet and include some exercise to help lower her number.  Pt. Instructed to call PAC Clinic back if she had any questions.

## 2019-10-16 ENCOUNTER — TEAM CONFERENCE (OUTPATIENT)
Dept: OTHER | Facility: CLINIC | Age: 54
End: 2019-10-16

## 2019-10-16 NOTE — TELEPHONE ENCOUNTER
SURGERY PLAN (PRE-OP PLAN)     Patient Position (indicated by x):  X  Lateral decubitus, Wixson hip positioner   x  Regular OR table   x  Reynolds catheter   X  Revision ALE drape with plastic side bags for leg   X  Blue U drape x2   Blue and white stockinet   Coban   Ioban      ALE Requests (indicated by x):    Echo BiMetric stems   X  Biomet TAPERLOC ingrowth stem      Biomet Integral cemented stem   X  Biomet RB cup, 32 heads     G7 cup      Biomet Bipolar cup      Specimens and cultures (indicated by x):      Tissue cultures, aerobic and anaerobic without gram stain      Frozen section      pathology specimens - fresh      pathology specimens - formalin      Summary:   53 yo female with end stage OA left hip.    Plan:  Primary left ALE    Michael Clark MD  Orthopaedic Surgery, Adult Reconstruction Fellow  Pager [phone]

## 2019-10-23 RX ORDER — ACETAMINOPHEN 500 MG
1000 TABLET ORAL ONCE
Status: CANCELLED | OUTPATIENT
Start: 2019-10-23 | End: 2019-10-23

## 2019-10-24 ENCOUNTER — TRANSFERRED RECORDS (OUTPATIENT)
Dept: HEALTH INFORMATION MANAGEMENT | Facility: CLINIC | Age: 54
End: 2019-10-24

## 2019-10-24 ENCOUNTER — RECORDS - HEALTHEAST (OUTPATIENT)
Dept: LAB | Facility: CLINIC | Age: 54
End: 2019-10-24

## 2019-10-24 LAB
ANION GAP SERPL CALCULATED.3IONS-SCNC: 9 MMOL/L (ref 5–18)
BUN SERPL-MCNC: 32 MG/DL (ref 8–22)
CALCIUM SERPL-MCNC: 9.8 MG/DL (ref 8.5–10.5)
CHLORIDE BLD-SCNC: 104 MMOL/L (ref 98–107)
CO2 SERPL-SCNC: 26 MMOL/L (ref 22–31)
CREAT SERPL-MCNC: 0.93 MG/DL (ref 0.6–1.1)
GFR SERPL CREATININE-BSD FRML MDRD: >60 ML/MIN/1.73M2
GLUCOSE BLD-MCNC: 101 MG/DL (ref 70–125)
POTASSIUM BLD-SCNC: 4.8 MMOL/L (ref 3.5–5)
SODIUM SERPL-SCNC: 139 MMOL/L (ref 136–145)

## 2019-10-25 ENCOUNTER — HOSPITAL ENCOUNTER (INPATIENT)
Facility: CLINIC | Age: 54
LOS: 2 days | Discharge: HOME | DRG: 470 | End: 2019-10-27
Attending: ORTHOPAEDIC SURGERY | Admitting: ORTHOPAEDIC SURGERY
Payer: COMMERCIAL

## 2019-10-25 ENCOUNTER — APPOINTMENT (OUTPATIENT)
Dept: GENERAL RADIOLOGY | Facility: CLINIC | Age: 54
DRG: 470 | End: 2019-10-25
Attending: ORTHOPAEDIC SURGERY
Payer: COMMERCIAL

## 2019-10-25 ENCOUNTER — ANESTHESIA (OUTPATIENT)
Dept: SURGERY | Facility: CLINIC | Age: 54
DRG: 470 | End: 2019-10-25
Payer: COMMERCIAL

## 2019-10-25 DIAGNOSIS — Z98.890 STATUS POST HIP SURGERY: Primary | ICD-10-CM

## 2019-10-25 LAB
ABO + RH BLD: NORMAL
ABO + RH BLD: NORMAL
BLD GP AB SCN SERPL QL: NORMAL
BLOOD BANK CMNT PATIENT-IMP: NORMAL
BLOOD BANK CMNT PATIENT-IMP: NORMAL
CREAT SERPL-MCNC: 0.71 MG/DL (ref 0.52–1.04)
GFR SERPL CREATININE-BSD FRML MDRD: >90 ML/MIN/{1.73_M2}
GLUCOSE BLDC GLUCOMTR-MCNC: 107 MG/DL (ref 70–99)
HCG UR QL: NEGATIVE
POTASSIUM SERPL-SCNC: 3.8 MMOL/L (ref 3.4–5.3)
SPECIMEN EXP DATE BLD: NORMAL

## 2019-10-25 PROCEDURE — 25000125 ZZHC RX 250: Performed by: ORTHOPAEDIC SURGERY

## 2019-10-25 PROCEDURE — 36000062 ZZH SURGERY LEVEL 4 1ST 30 MIN - UMMC: Performed by: ORTHOPAEDIC SURGERY

## 2019-10-25 PROCEDURE — 71000015 ZZH RECOVERY PHASE 1 LEVEL 2 EA ADDTL HR: Performed by: ORTHOPAEDIC SURGERY

## 2019-10-25 PROCEDURE — 0SRB04A REPLACEMENT OF LEFT HIP JOINT WITH CERAMIC ON POLYETHYLENE SYNTHETIC SUBSTITUTE, UNCEMENTED, OPEN APPROACH: ICD-10-PCS | Performed by: ORTHOPAEDIC SURGERY

## 2019-10-25 PROCEDURE — C1776 JOINT DEVICE (IMPLANTABLE): HCPCS | Performed by: ORTHOPAEDIC SURGERY

## 2019-10-25 PROCEDURE — 25000128 H RX IP 250 OP 636: Performed by: ANESTHESIOLOGY

## 2019-10-25 PROCEDURE — C1713 ANCHOR/SCREW BN/BN,TIS/BN: HCPCS | Performed by: ORTHOPAEDIC SURGERY

## 2019-10-25 PROCEDURE — 37000008 ZZH ANESTHESIA TECHNICAL FEE, 1ST 30 MIN: Performed by: ORTHOPAEDIC SURGERY

## 2019-10-25 PROCEDURE — 40000170 ZZH STATISTIC PRE-PROCEDURE ASSESSMENT II: Performed by: ORTHOPAEDIC SURGERY

## 2019-10-25 PROCEDURE — 00000146 ZZHCL STATISTIC GLUCOSE BY METER IP

## 2019-10-25 PROCEDURE — 12000001 ZZH R&B MED SURG/OB UMMC

## 2019-10-25 PROCEDURE — 25000128 H RX IP 250 OP 636: Performed by: NURSE ANESTHETIST, CERTIFIED REGISTERED

## 2019-10-25 PROCEDURE — 25000128 H RX IP 250 OP 636: Performed by: ORTHOPAEDIC SURGERY

## 2019-10-25 PROCEDURE — 25000125 ZZHC RX 250: Performed by: NURSE ANESTHETIST, CERTIFIED REGISTERED

## 2019-10-25 PROCEDURE — 97161 PT EVAL LOW COMPLEX 20 MIN: CPT | Mod: GP | Performed by: PHYSICAL THERAPIST

## 2019-10-25 PROCEDURE — 36415 COLL VENOUS BLD VENIPUNCTURE: CPT | Performed by: ANESTHESIOLOGY

## 2019-10-25 PROCEDURE — 25000132 ZZH RX MED GY IP 250 OP 250 PS 637: Performed by: ANESTHESIOLOGY

## 2019-10-25 PROCEDURE — 25800030 ZZH RX IP 258 OP 636: Performed by: NURSE ANESTHETIST, CERTIFIED REGISTERED

## 2019-10-25 PROCEDURE — 25800030 ZZH RX IP 258 OP 636: Performed by: ORTHOPAEDIC SURGERY

## 2019-10-25 PROCEDURE — 84132 ASSAY OF SERUM POTASSIUM: CPT | Performed by: ANESTHESIOLOGY

## 2019-10-25 PROCEDURE — 97530 THERAPEUTIC ACTIVITIES: CPT | Mod: GP | Performed by: PHYSICAL THERAPIST

## 2019-10-25 PROCEDURE — 25000132 ZZH RX MED GY IP 250 OP 250 PS 637: Performed by: STUDENT IN AN ORGANIZED HEALTH CARE EDUCATION/TRAINING PROGRAM

## 2019-10-25 PROCEDURE — 40000985 XR PELVIS PORT 1/2 VW

## 2019-10-25 PROCEDURE — 25000566 ZZH SEVOFLURANE, EA 15 MIN: Performed by: ORTHOPAEDIC SURGERY

## 2019-10-25 PROCEDURE — 97110 THERAPEUTIC EXERCISES: CPT | Mod: GP | Performed by: PHYSICAL THERAPIST

## 2019-10-25 PROCEDURE — 71000014 ZZH RECOVERY PHASE 1 LEVEL 2 FIRST HR: Performed by: ORTHOPAEDIC SURGERY

## 2019-10-25 PROCEDURE — 40000986 XR PELVIS AND HIP LEFT 1 VIEW

## 2019-10-25 PROCEDURE — 82565 ASSAY OF CREATININE: CPT | Performed by: ANESTHESIOLOGY

## 2019-10-25 PROCEDURE — 27210794 ZZH OR GENERAL SUPPLY STERILE: Performed by: ORTHOPAEDIC SURGERY

## 2019-10-25 PROCEDURE — 25000128 H RX IP 250 OP 636: Performed by: STUDENT IN AN ORGANIZED HEALTH CARE EDUCATION/TRAINING PROGRAM

## 2019-10-25 PROCEDURE — 37000009 ZZH ANESTHESIA TECHNICAL FEE, EACH ADDTL 15 MIN: Performed by: ORTHOPAEDIC SURGERY

## 2019-10-25 PROCEDURE — 36000064 ZZH SURGERY LEVEL 4 EA 15 ADDTL MIN - UMMC: Performed by: ORTHOPAEDIC SURGERY

## 2019-10-25 PROCEDURE — 99221 1ST HOSP IP/OBS SF/LOW 40: CPT | Performed by: INTERNAL MEDICINE

## 2019-10-25 PROCEDURE — 81025 URINE PREGNANCY TEST: CPT | Performed by: ANESTHESIOLOGY

## 2019-10-25 PROCEDURE — 99207 ZZC CDG-CODE CATEGORY CHANGED: CPT | Performed by: INTERNAL MEDICINE

## 2019-10-25 DEVICE — IMPLANTABLE DEVICE
Type: IMPLANTABLE DEVICE | Site: HIP | Status: FUNCTIONAL
Brand: RINGLOC® HIP SYSTEM

## 2019-10-25 DEVICE — IMPLANTABLE DEVICE
Type: IMPLANTABLE DEVICE | Site: HIP | Status: FUNCTIONAL
Brand: RINGLOC RANAWAT / BURSTEIN HIP SYSTEM

## 2019-10-25 DEVICE — IMPLANTABLE DEVICE
Type: IMPLANTABLE DEVICE | Site: HIP | Status: FUNCTIONAL
Brand: RINGLOC HIP SYSTEM

## 2019-10-25 RX ORDER — LIDOCAINE 40 MG/G
CREAM TOPICAL
Status: DISCONTINUED | OUTPATIENT
Start: 2019-10-25 | End: 2019-10-25 | Stop reason: HOSPADM

## 2019-10-25 RX ORDER — BISACODYL 10 MG
10 SUPPOSITORY, RECTAL RECTAL DAILY
Status: DISCONTINUED | OUTPATIENT
Start: 2019-10-26 | End: 2019-10-27 | Stop reason: HOSPADM

## 2019-10-25 RX ORDER — PROPOFOL 10 MG/ML
INJECTION, EMULSION INTRAVENOUS PRN
Status: DISCONTINUED | OUTPATIENT
Start: 2019-10-25 | End: 2019-10-25

## 2019-10-25 RX ORDER — ACETAMINOPHEN 325 MG/1
975 TABLET ORAL EVERY 8 HOURS
Status: DISCONTINUED | OUTPATIENT
Start: 2019-10-25 | End: 2019-10-27 | Stop reason: HOSPADM

## 2019-10-25 RX ORDER — DEXAMETHASONE SODIUM PHOSPHATE 4 MG/ML
INJECTION, SOLUTION INTRA-ARTICULAR; INTRALESIONAL; INTRAMUSCULAR; INTRAVENOUS; SOFT TISSUE PRN
Status: DISCONTINUED | OUTPATIENT
Start: 2019-10-25 | End: 2019-10-25

## 2019-10-25 RX ORDER — SODIUM CHLORIDE, SODIUM LACTATE, POTASSIUM CHLORIDE, CALCIUM CHLORIDE 600; 310; 30; 20 MG/100ML; MG/100ML; MG/100ML; MG/100ML
INJECTION, SOLUTION INTRAVENOUS CONTINUOUS PRN
Status: DISCONTINUED | OUTPATIENT
Start: 2019-10-25 | End: 2019-10-25

## 2019-10-25 RX ORDER — LISINOPRIL AND HYDROCHLOROTHIAZIDE 12.5; 2 MG/1; MG/1
1 TABLET ORAL DAILY
Status: ON HOLD | COMMUNITY
End: 2019-10-27

## 2019-10-25 RX ORDER — FENTANYL CITRATE 50 UG/ML
INJECTION, SOLUTION INTRAMUSCULAR; INTRAVENOUS PRN
Status: DISCONTINUED | OUTPATIENT
Start: 2019-10-25 | End: 2019-10-25

## 2019-10-25 RX ORDER — CELECOXIB 200 MG/1
200 CAPSULE ORAL ONCE
Status: COMPLETED | OUTPATIENT
Start: 2019-10-25 | End: 2019-10-25

## 2019-10-25 RX ORDER — OXYCODONE HYDROCHLORIDE 5 MG/1
5-10 TABLET ORAL
Status: DISCONTINUED | OUTPATIENT
Start: 2019-10-25 | End: 2019-10-27 | Stop reason: HOSPADM

## 2019-10-25 RX ORDER — LIDOCAINE 40 MG/G
CREAM TOPICAL
Status: DISCONTINUED | OUTPATIENT
Start: 2019-10-25 | End: 2019-10-27 | Stop reason: HOSPADM

## 2019-10-25 RX ORDER — SODIUM CHLORIDE, SODIUM LACTATE, POTASSIUM CHLORIDE, CALCIUM CHLORIDE 600; 310; 30; 20 MG/100ML; MG/100ML; MG/100ML; MG/100ML
INJECTION, SOLUTION INTRAVENOUS CONTINUOUS
Status: DISCONTINUED | OUTPATIENT
Start: 2019-10-25 | End: 2019-10-25 | Stop reason: HOSPADM

## 2019-10-25 RX ORDER — FENTANYL CITRATE 50 UG/ML
25-50 INJECTION, SOLUTION INTRAMUSCULAR; INTRAVENOUS
Status: DISCONTINUED | OUTPATIENT
Start: 2019-10-25 | End: 2019-10-25 | Stop reason: HOSPADM

## 2019-10-25 RX ORDER — NALOXONE HYDROCHLORIDE 0.4 MG/ML
.1-.4 INJECTION, SOLUTION INTRAMUSCULAR; INTRAVENOUS; SUBCUTANEOUS
Status: DISCONTINUED | OUTPATIENT
Start: 2019-10-25 | End: 2019-10-25

## 2019-10-25 RX ORDER — CEFAZOLIN SODIUM 2 G/100ML
2 INJECTION, SOLUTION INTRAVENOUS
Status: COMPLETED | OUTPATIENT
Start: 2019-10-25 | End: 2019-10-25

## 2019-10-25 RX ORDER — ACETAMINOPHEN 325 MG/1
975 TABLET ORAL ONCE
Status: COMPLETED | OUTPATIENT
Start: 2019-10-25 | End: 2019-10-25

## 2019-10-25 RX ORDER — HYDROMORPHONE HYDROCHLORIDE 1 MG/ML
.3-.5 INJECTION, SOLUTION INTRAMUSCULAR; INTRAVENOUS; SUBCUTANEOUS EVERY 5 MIN PRN
Status: DISCONTINUED | OUTPATIENT
Start: 2019-10-25 | End: 2019-10-25 | Stop reason: HOSPADM

## 2019-10-25 RX ORDER — GABAPENTIN 100 MG/1
300 CAPSULE ORAL ONCE
Status: COMPLETED | OUTPATIENT
Start: 2019-10-25 | End: 2019-10-25

## 2019-10-25 RX ORDER — CEFAZOLIN SODIUM 2 G/100ML
2 INJECTION, SOLUTION INTRAVENOUS EVERY 8 HOURS
Status: COMPLETED | OUTPATIENT
Start: 2019-10-25 | End: 2019-10-26

## 2019-10-25 RX ORDER — LIDOCAINE HYDROCHLORIDE 20 MG/ML
INJECTION, SOLUTION INFILTRATION; PERINEURAL PRN
Status: DISCONTINUED | OUTPATIENT
Start: 2019-10-25 | End: 2019-10-25

## 2019-10-25 RX ORDER — ONDANSETRON 2 MG/ML
4 INJECTION INTRAMUSCULAR; INTRAVENOUS EVERY 30 MIN PRN
Status: DISCONTINUED | OUTPATIENT
Start: 2019-10-25 | End: 2019-10-25 | Stop reason: HOSPADM

## 2019-10-25 RX ORDER — ONDANSETRON 4 MG/1
4 TABLET, ORALLY DISINTEGRATING ORAL EVERY 30 MIN PRN
Status: DISCONTINUED | OUTPATIENT
Start: 2019-10-25 | End: 2019-10-25 | Stop reason: HOSPADM

## 2019-10-25 RX ORDER — NALOXONE HYDROCHLORIDE 0.4 MG/ML
.1-.4 INJECTION, SOLUTION INTRAMUSCULAR; INTRAVENOUS; SUBCUTANEOUS
Status: DISCONTINUED | OUTPATIENT
Start: 2019-10-25 | End: 2019-10-27 | Stop reason: HOSPADM

## 2019-10-25 RX ORDER — ACETAMINOPHEN 325 MG/1
650 TABLET ORAL EVERY 4 HOURS PRN
Status: DISCONTINUED | OUTPATIENT
Start: 2019-10-28 | End: 2019-10-27 | Stop reason: HOSPADM

## 2019-10-25 RX ORDER — AMLODIPINE BESYLATE 5 MG/1
5 TABLET ORAL DAILY
COMMUNITY
End: 2023-06-02

## 2019-10-25 RX ORDER — ONDANSETRON 2 MG/ML
INJECTION INTRAMUSCULAR; INTRAVENOUS PRN
Status: DISCONTINUED | OUTPATIENT
Start: 2019-10-25 | End: 2019-10-25

## 2019-10-25 RX ADMIN — TRANEXAMIC ACID 1 G: 1 INJECTION, SOLUTION INTRAVENOUS at 11:00

## 2019-10-25 RX ADMIN — CEFAZOLIN SODIUM 2 G: 2 INJECTION, SOLUTION INTRAVENOUS at 20:05

## 2019-10-25 RX ADMIN — LIDOCAINE HYDROCHLORIDE 100 MG: 20 INJECTION, SOLUTION INFILTRATION; PERINEURAL at 10:41

## 2019-10-25 RX ADMIN — ACETAMINOPHEN 975 MG: 325 TABLET, FILM COATED ORAL at 17:40

## 2019-10-25 RX ADMIN — ROCURONIUM BROMIDE 50 MG: 10 INJECTION INTRAVENOUS at 10:41

## 2019-10-25 RX ADMIN — ROCURONIUM BROMIDE 30 MG: 10 INJECTION INTRAVENOUS at 11:25

## 2019-10-25 RX ADMIN — ASPIRIN 325 MG: 325 TABLET, DELAYED RELEASE ORAL at 20:04

## 2019-10-25 RX ADMIN — SODIUM CHLORIDE, POTASSIUM CHLORIDE, SODIUM LACTATE AND CALCIUM CHLORIDE: 600; 310; 30; 20 INJECTION, SOLUTION INTRAVENOUS at 13:22

## 2019-10-25 RX ADMIN — FENTANYL CITRATE 50 MCG: 50 INJECTION INTRAMUSCULAR; INTRAVENOUS at 13:43

## 2019-10-25 RX ADMIN — HYDROMORPHONE HYDROCHLORIDE 0.5 MG: 1 INJECTION, SOLUTION INTRAMUSCULAR; INTRAVENOUS; SUBCUTANEOUS at 11:52

## 2019-10-25 RX ADMIN — CEFAZOLIN SODIUM 2 G: 2 INJECTION, SOLUTION INTRAVENOUS at 10:55

## 2019-10-25 RX ADMIN — OXYCODONE HYDROCHLORIDE 10 MG: 5 TABLET ORAL at 21:43

## 2019-10-25 RX ADMIN — OXYCODONE HYDROCHLORIDE 10 MG: 5 TABLET ORAL at 18:34

## 2019-10-25 RX ADMIN — ONDANSETRON 4 MG: 2 INJECTION INTRAMUSCULAR; INTRAVENOUS at 14:53

## 2019-10-25 RX ADMIN — OXYCODONE HYDROCHLORIDE 5 MG: 5 TABLET ORAL at 15:35

## 2019-10-25 RX ADMIN — DEXAMETHASONE SODIUM PHOSPHATE 4 MG: 4 INJECTION, SOLUTION INTRAMUSCULAR; INTRAVENOUS at 12:15

## 2019-10-25 RX ADMIN — ROCURONIUM BROMIDE 20 MG: 10 INJECTION INTRAVENOUS at 12:23

## 2019-10-25 RX ADMIN — SUGAMMADEX 200 MG: 100 INJECTION, SOLUTION INTRAVENOUS at 13:07

## 2019-10-25 RX ADMIN — OXYCODONE HYDROCHLORIDE 5 MG: 5 TABLET ORAL at 14:58

## 2019-10-25 RX ADMIN — FENTANYL CITRATE 100 MCG: 50 INJECTION, SOLUTION INTRAMUSCULAR; INTRAVENOUS at 10:41

## 2019-10-25 RX ADMIN — HYDROMORPHONE HYDROCHLORIDE 0.3 MG: 1 INJECTION, SOLUTION INTRAMUSCULAR; INTRAVENOUS; SUBCUTANEOUS at 14:29

## 2019-10-25 RX ADMIN — HYDROMORPHONE HYDROCHLORIDE 0.5 MG: 1 INJECTION, SOLUTION INTRAMUSCULAR; INTRAVENOUS; SUBCUTANEOUS at 13:08

## 2019-10-25 RX ADMIN — PROPOFOL 200 MG: 10 INJECTION, EMULSION INTRAVENOUS at 10:41

## 2019-10-25 RX ADMIN — ONDANSETRON 4 MG: 2 INJECTION INTRAMUSCULAR; INTRAVENOUS at 12:59

## 2019-10-25 RX ADMIN — ROCURONIUM BROMIDE 20 MG: 10 INJECTION INTRAVENOUS at 11:45

## 2019-10-25 RX ADMIN — MIDAZOLAM 2 MG: 1 INJECTION INTRAMUSCULAR; INTRAVENOUS at 10:30

## 2019-10-25 RX ADMIN — CEFAZOLIN SODIUM 1 G: 2 INJECTION, SOLUTION INTRAVENOUS at 12:53

## 2019-10-25 RX ADMIN — ACETAMINOPHEN 975 MG: 325 TABLET, FILM COATED ORAL at 08:54

## 2019-10-25 RX ADMIN — HYDROMORPHONE HYDROCHLORIDE 0.2 MG: 1 INJECTION, SOLUTION INTRAMUSCULAR; INTRAVENOUS; SUBCUTANEOUS at 14:58

## 2019-10-25 RX ADMIN — CELECOXIB 200 MG: 200 CAPSULE ORAL at 08:54

## 2019-10-25 RX ADMIN — SODIUM CHLORIDE, POTASSIUM CHLORIDE, SODIUM LACTATE AND CALCIUM CHLORIDE: 600; 310; 30; 20 INJECTION, SOLUTION INTRAVENOUS at 10:40

## 2019-10-25 RX ADMIN — FENTANYL CITRATE 25 MCG: 50 INJECTION INTRAMUSCULAR; INTRAVENOUS at 13:49

## 2019-10-25 RX ADMIN — PHENYLEPHRINE HYDROCHLORIDE 100 MCG: 10 INJECTION INTRAVENOUS at 11:29

## 2019-10-25 RX ADMIN — HYDROMORPHONE HYDROCHLORIDE 0.2 MG: 1 INJECTION, SOLUTION INTRAMUSCULAR; INTRAVENOUS; SUBCUTANEOUS at 14:16

## 2019-10-25 RX ADMIN — GABAPENTIN 300 MG: 300 CAPSULE ORAL at 08:54

## 2019-10-25 RX ADMIN — HYDROMORPHONE HYDROCHLORIDE 0.3 MG: 1 INJECTION, SOLUTION INTRAMUSCULAR; INTRAVENOUS; SUBCUTANEOUS at 14:03

## 2019-10-25 RX ADMIN — FENTANYL CITRATE 25 MCG: 50 INJECTION INTRAMUSCULAR; INTRAVENOUS at 14:03

## 2019-10-25 RX ADMIN — FENTANYL CITRATE 100 MCG: 50 INJECTION, SOLUTION INTRAMUSCULAR; INTRAVENOUS at 11:19

## 2019-10-25 ASSESSMENT — ACTIVITIES OF DAILY LIVING (ADL)
FALL_HISTORY_WITHIN_LAST_SIX_MONTHS: YES
ADLS_ACUITY_SCORE: 20
TOILETING: 1-->ASSISTIVE EQUIPMENT
BATHING: 0-->INDEPENDENT
NUMBER_OF_TIMES_PATIENT_HAS_FALLEN_WITHIN_LAST_SIX_MONTHS: 1
DRESS: 1-->ASSISTIVE EQUIPMENT
COGNITION: 0 - NO COGNITION ISSUES REPORTED
RETIRED_COMMUNICATION: 0-->UNDERSTANDS/COMMUNICATES WITHOUT DIFFICULTY
TRANSFERRING: 0-->INDEPENDENT
SWALLOWING: 0-->SWALLOWS FOODS/LIQUIDS WITHOUT DIFFICULTY
WHICH_OF_THE_ABOVE_FUNCTIONAL_RISKS_HAD_A_RECENT_ONSET_OR_CHANGE?: AMBULATION;FALL HISTORY
RETIRED_EATING: 0-->INDEPENDENT
AMBULATION: 1-->ASSISTIVE EQUIPMENT

## 2019-10-25 ASSESSMENT — MIFFLIN-ST. JEOR: SCORE: 1680.63

## 2019-10-25 NOTE — OR NURSING
PACU to Inpatient Nursing Handoff    Patient Licha Birch is a 54 year old adult who speaks English.   Procedure Procedure(s):  Total Left Hip Arthroplasty   Surgeon(s) Primary: Rashid Schuster MD  Fellow - Assisting: Michael Clark MD     No Known Allergies    Isolation  [unfilled]     Past Medical History   has a past medical history of Osteoarthritis of left hip.    Anesthesia General   Dermatome Level     Preop Meds acetaminophen (Tylenol) - time given: 0854  celecoxib (Celebrex) - time given: 0854  gabapentin (Neurontin) - time given: 0854   Nerve block Not applicable   Intraop Meds dexamethasone (Decadron)  fentanyl (Sublimaze): 200 mcg total  hydromorphone (Dilaudid): 1.0 mg total  ondansetron (Zofran): last given at 1259   Local Meds Yes - Local Cocktail (morphine, ropivacaine, epinephrine, Toradol)   Antibiotics cefazolin (Ancef) - last given at 1253     Pain Patient Currently in Pain: yes  Comfort: tolerable with discomfort  Pain Control: partially effective   PACU meds  zofran 4 mg at 1453  Oxycodone 5 mg given at 1455 fentanyl total 100 mcg given at 1403, dilaudid total 1.5 given at 1459 additional dose of oxycodone 5 mg given at 1535   PCA / epidural No   Capnography     Telemetry ECG Rhythm: Normal sinus rhythm   Inpatient Telemetry Monitor Ordered? No        Labs Glucose Lab Results   Component Value Date     10/11/2019       Hgb Lab Results   Component Value Date    HGB 13.8 10/11/2019       INR No results found for: INR   PACU Imaging Completed     Wound/Incision Incision/Surgical Site 10/25/19 Left Hip (Active)   Incision Assessment UTV 10/25/2019  2:15 PM   Nicole-Incision Assessment UTV 10/25/2019  2:15 PM   Closure AFTAB 10/25/2019  2:15 PM   Incision Drainage Amount None 10/25/2019  2:15 PM   Dressing Intervention Clean, dry, intact 10/25/2019  2:15 PM   Number of days: 0      CMS        Equipment ice pack and abductor pillow   Other LDA       IV Access Peripheral IV 10/25/19 Left Hand  (Active)   Site Assessment WDL 10/25/2019  2:15 PM   Line Status Infusing 10/25/2019  2:15 PM   Phlebitis Scale 0-->no symptoms 10/25/2019  2:15 PM   Infiltration Scale 0 10/25/2019  2:15 PM   Number of days: 0      Blood Products Not applicable EBL 63 mL   Intake/Output Date 10/25/19 0700 - 10/26/19 0659   Shift 8560-0557 9642-2211 0519-1189 24 Hour Total   INTAKE   I.V. 1000   1000   Shift Total(mL/kg) 1000(9.5)   1000(9.5)   OUTPUT   Urine 190   190   Shift Total(mL/kg) 190(1.8)   190(1.8)   Weight (kg) 105.3 105.3 105.3 105.3      Drains / Reynolds Closed/Suction Drain 1 Left;Lateral Hip Bulb 15 [place] (Active)   Site Description UTV 10/25/2019  2:15 PM   Dressing Status Normal: Clean, Dry & Intact 10/25/2019  2:15 PM   Drainage Appearance Bloody/Bright Red 10/25/2019  2:15 PM   Status To bulb suction 10/25/2019  2:15 PM   Number of days: 0       Urethral Catheter Latex 16 fr (Active)   Collection Container Standard 10/25/2019  2:15 PM   Securement Method Securing device (Describe) 10/25/2019  2:15 PM   Rationale for Continued Use Anesthesia 10/25/2019  2:15 PM   Number of days: 0      Time of void PreOp Void Prior to Procedure: 0830 (10/25/19 0839)    PostOp      Diapered? no   Bladder Scan     PO    ice chips     Vitals    B/P: 132/81  T: 97.3  F (36.3  C)    Temp src: Oral  P:  Pulse: 89 (10/25/19 1415)    Heart Rate: 84 (10/25/19 1430)     R: 11  O2:  SpO2: 98 %    O2 Device: Nasal cannula (10/25/19 1415)    Oxygen Delivery: 2 LPM (10/25/19 1415)         Family/support present family   Patient belongings     Patient transported on bed   DC meds/scripts (obs/outpt) Not applicable   Inpatient Pain Meds Released? Yes       Special needs/considerations None   Tasks needing completion None       Billie Esteves, RN  ASCOM [id number]

## 2019-10-25 NOTE — CONSULTS
HOSPITALIST INITIAL CONSULT NOTE    Referring Provider:  Rashid Schuster MD      Reason for Consult         History of Present Illness     Licha Birch is 54 year old year old adult with hx of HTN, Left hip OA is being admitted s/p Left ALE on 10/25/2019    EBL  LR 1000 ml  ml    Currently reports pain is controlled. Denies any chest pain, shortness of breath, lightheaded, dizziness.             Past Medical History     Past Medical History:   Diagnosis Date     Osteoarthritis of left hip       HTN    Past Surgical History     Past Surgical History:   Procedure Laterality Date     ENT SURGERY      right mandible surgery     KNEE SURGERY       TONSILLECTOMY      as a child          Medications     All his current medications are reviewed in current medication section of [facility].  Home medications are reviewed.  Current Facility-Administered Medications   Medication     [START ON 10/28/2019] acetaminophen (TYLENOL) tablet 650 mg     acetaminophen (TYLENOL) tablet 975 mg     aspirin (ASA) EC tablet 325 mg     [START ON 10/26/2019] bisacodyl (DULCOLAX) Suppository 10 mg     ceFAZolin (ANCEF) intermittent infusion 2 g in 100 mL dextrose PRE-MIX     lidocaine (LMX4) cream     lidocaine 1 % 1 mL     magnesium hydroxide (MILK OF MAGNESIA) suspension 15 mL     naloxone (NARCAN) injection 0.1-0.4 mg     oxyCODONE (ROXICODONE) tablet 5-10 mg     sodium chloride (PF) 0.9% PF flush 3 mL     sodium chloride (PF) 0.9% PF flush 3 mL        Allergies      No Known Allergies     Family History     Family History   Problem Relation Age of Onset     Unknown/Adopted Mother      Hypertension Mother      Osteoporosis Mother      Thyroid Disease Mother      Breast Cancer Mother      Hyperlipidemia Mother      Atrial fibrillation Mother      Myocardial Infarction Father           Social History     Social History     Socioeconomic History     Marital status: Single     Spouse name: Not on file     Number of children: Not on file     Years  "of education: Not on file     Highest education level: Not on file   Occupational History     Not on file   Social Needs     Financial resource strain: Not on file     Food insecurity:     Worry: Not on file     Inability: Not on file     Transportation needs:     Medical: Not on file     Non-medical: Not on file   Tobacco Use     Smoking status: Former Smoker     Packs/day: 0.50     Years: 35.00     Pack years: 17.50     Types: Cigarettes     Last attempt to quit: 10/1/2019     Years since quittin.0     Smokeless tobacco: Never Used     Tobacco comment: Has smoked on and off over the 35 years    Substance and Sexual Activity     Alcohol use: Yes     Comment: Socially     Drug use: Never     Sexual activity: Not on file   Lifestyle     Physical activity:     Days per week: Not on file     Minutes per session: Not on file     Stress: Not on file   Relationships     Social connections:     Talks on phone: Not on file     Gets together: Not on file     Attends [employer] service: Not on file     Active member of club or organization: Not on file     Attends meetings of clubs or organizations: Not on file     Relationship status: Not on file     Intimate partner violence:     Fear of current or ex partner: Not on file     Emotionally abused: Not on file     Physically abused: Not on file     Forced sexual activity: Not on file   Other Topics Concern     Not on file   Social History Narrative     Not on file        Review of Systems   A 10 point review of systems was taken and largely negative except for that which was stated above.      Physical Exam       Vital signs:    Blood pressure 126/78, pulse 91, temperature 97.3  F (36.3  C), temperature source Oral, resp. rate 12, height 1.702 m (5' 7\"), weight 105.3 kg (232 lb 2.3 oz), SpO2 98 %.  Estimated body mass index is 36.36 kg/m  as calculated from the following:    Height as of this encounter: 1.702 m (5' 7\").    Weight as of this encounter: 105.3 kg (232 lb 2.3 " oz).      Intake/Output Summary (Last 24 hours) at 10/25/2019 1648  Last data filed at 10/25/2019 1500  Gross per 24 hour   Intake 1025 ml   Output 310 ml   Net 715 ml      HEENT: No icterus, no pallor  Cardiovascular: S1, S2 normal  Respiratory: B/L CTA  Abdomen: Soft, NT, BS+  Neurology: Alert, awake,and oriented. No tremors  Extremities: Left hip dressing in place       Laboratory and Imaging Studies     Laboratory and Imaging studies reviewed in the results review section of Epic. Pertinent studies are as below:    CMP  Recent Labs   Lab 10/25/19  0828   POTASSIUM 3.8   CR 0.71   GFRESTIMATED >90   GFRESTBLACK >90     CBCNo lab results found in last 7 days.  INRNo lab results found in last 7 days.  Arterial Blood GasNo lab results found in last 7 days.       Impression/Recommendations     54 year old year old adult with hx of HTN, Left hip OA is being admitted s/p Left ALE on 10/25/2019  # s/p Left ALE on 10/25/2019 :  Management primarily per Ortho team.  - Wound cares, Dressings, Surgical pain management, DVT Prophylaxis  per primary team.   - Monitor anemia, hemodynamics, Input/Output  - Encourage Incentive spirometry  - Laxatives for constipation prophylaxis     # HTN: PTA on Amlodipine, Lisinopril-hydrochlorothiazide  BP normal  - Hold both these agents tonight  - BMP in AM  - Restart as dictated by BP            Donald Parks  Internal Medicine/Hospitalist  [place] [phone]

## 2019-10-25 NOTE — PLAN OF CARE
Discharge Planner PT   Patient plan for discharge: Home with Assist  Current status: WBAT, Min A with bed mobility and transfers, Able to mobilize 6 steps in room without complications, Eval completed, treatment initiated  Barriers to return to prior living situation: pain, WB tolerance, strength  Recommendations for discharge: Home with assist  Rationale for recommendations: Pt should do well once she is able to begin gait training and regaining her strength from procedure .        Entered by: Darryl Martinez 10/25/2019 5:39 PM

## 2019-10-25 NOTE — OP NOTE
PREOPERATIVE DIAGNOSIS: osteonecrosis of the femoral headof  Left hip.   POSTOPERATIVE DIAGNOSIS: Same as pre-op.   PROCEDURE: Left total hip arthroplasty.   COMPONENTS USED:   Biomet Ranawat-Janet acetabular shell, 54  mm diameter.   Liner size 32 mm inner diameter. Elevated lip: Yes  Taperloc femoral stem size 12 mm, normal offset, STD mm neck length, 32-mm diameter head.   INDICATIONS: osteonecrosis of the femoral head  DESCRIPTION OF PROCEDURE: The patient was placed on the operating table in the lateral decubitus position after induction of general anesthesia. With the operated hip turned up, standard prep and drape was performed. A minimal incision posterior lateral approach to the hip joint was then performed making a curvilinear incision over the greater trochanter and taking this down through the subcutaneous tissue. The gluteus lucy was then split in line with its fibers. After internal rotation of the femur, the piriformis and obturator internus muscles were detached and the soft tissue capsule was divided from the posterior aspect of the femoral neck. The tendons were tagged with a suture. 2-0 silk sutures were used to control the circumflex vessels. At this point, the capsule was incised superiorly and inferiorly as a rhomboid shaped trap-door. The hip joint was then dislocated. A femoral neck osteotomy was performed at the mid portion of the femoral neck. Head was removed. The femur was then displaced anteriorly and the acetabulum was exposed after additional capsular release as needed.    The acetabulum was now reamed by medializing to within 2 mm of the medial wall. Sequentially enlarging reaming was performed to a 1 mm undersize relative to the cup size implanted. The above mentioned size cup was selected as the optimal implant. It was impacted into a 45-degree abducted and 20-degree anteverted position. Fixation screws, 6.5 mm diameter were placed as needed for stability.  A trial liner  component was placed.   Attention was now directed to the femur where it was exposed. Sequential reaming and broaching were performed to allow placement of the above mentioned diameter component. After trialing and using the broach, the hip was found to be secure and stable in full extension and external rotation of > 45 degrees as well as flexion of 90 degrees combined with internal rotation > 45 degrees.  Leg lengths were judged to be within 5 mm of symmetry.   At this time, the acetabulum was re-exposed and a liner insert locked into position with the apex of the elevated lip placed at 9 o'clock position as needed. After securing the stability of the implant, attention was directed to the femur where the above-mentioned size was inserted in antegrade fashion. There is no evidence of any fracture of the femur during insertion. Again, trial reduction was performed and the above mentioned size neck length was selected. The range of motion was confirmed again and the hip was stable. The real head was impacted on the [place] taper junction after it had been cleansed and dried.  A thorough irrigation of the wound was now performed. A drain was placed in the deep portion of the wound. Wound closure was accomplished by reapproximating the posterior capsular soft tissues as well as obturator internus and the piriformis muscles. The remainder of the wound was closed in layers with absorbable sutures using standard technique. Sterile dressing was applied.   Postoperative plan:  Weightbearing as tolerated on the operated hip. Standard posterior hip exposure dislocation precautions. Apsprirn 325mg BIDx 4 week's duration         Michael Clark MD  [facility] Arthroplasty Fellow    Attending MD (Dr. Rashid Schuster) Attestation:  I was present during the key portions of the procedure and I was immediately available for the entire procedure between opening and closing.    Rashid Schuster MD  [facility]  Oncology and Adult  Reconstructive Surgery  Dept Orthopaedic Surgery, [facility]

## 2019-10-25 NOTE — ANESTHESIA CARE TRANSFER NOTE
Patient: Licha Birch    Procedure(s):  Total Left Hip Arthroplasty    Diagnosis: Osteoarthritis Left Hip  Diagnosis Additional Information: No value filed.    Anesthesia Type:   General     Note:  Airway :Face Mask  Patient transferred to:PACU  Comments:   -on 6L O2 via FM, Pt Spont.  breathing, awake & alert, monitors placed, VSS, RN at bedside, no airway      Vitals: (Last set prior to Anesthesia Care Transfer)    CRNA VITALS  10/25/2019 1254 - 10/25/2019 1328      10/25/2019             Pulse:  97    SpO2:  100 %                Electronically Signed By: GALO Hernandez CRNA  October 25, 2019  1:28 PM

## 2019-10-25 NOTE — PROGRESS NOTES
10/25/19 3943   Living Environment   Lives With child(schuyler), adult   Living Arrangements house   Home Accessibility stairs to enter home   Number of Stairs, Main Entrance 3   Stair Railings, Main Entrance railing on left side (ascending)   Transportation Anticipated family or friend will provide   Self-Care   Usual Activity Tolerance good   Current Activity Tolerance good   Regular Exercise No   Equipment Currently Used at Home cane, straight;grab bar, toilet   Functional Level Prior   Ambulation 1-->assistive equipment   Transferring 0-->independent   Toileting 1-->assistive equipment   Bathing 1-->assistive equipment   Communication 0-->understands/communicates without difficulty   Swallowing 0-->swallows foods/liquids without difficulty   Cognition 0 - no cognition issues reported   Fall history within last six months yes   Number of times patient has fallen within last six months 1   Which of the above functional risks had a recent onset or change? ambulation;fall history   General Information   Onset of Illness/Injury or Date of Surgery - Date 10/25/19   Referring Physician Dr Landen BEAULIEU   Patient/Family Goals Statement get up a flight of stairs, MOA   Pertinent History of Current Problem (include personal factors and/or comorbidities that impact the POC) S/P ALE of the left hip -    Precautions/Limitations fall precautions;left hip precautions   Weight-Bearing Status - LLE weight-bearing as tolerated   Cognitive Status Examination   Orientation orientation to person, place and time   Level of Consciousness alert   Follows Commands and Answers Questions 100% of the time   Personal Safety and Judgment intact   Memory intact   Pain Assessment   Patient Currently in Pain Yes, see Vital Sign flowsheet   Integumentary/Edema   Integumentary/Edema no deficits were identifed   Posture    Posture Forward head position;Protracted shoulders   Range of Motion (ROM)   ROM Comment Left Hip flexion 40 deg,  extension -10  degrees    Strength   Strength Comments Pt was able to stand at EOB using walker    Bed Mobility   Bed Mobility Comments Pt was Mod A for Leg placement during scooting and sit pivot to EOB    Transfer Skills   Transfer Comments Pt was able to transfer from sitting to standing using a FWW Corazon x 2    Gait   Gait Comments Pt was able to AMB from EOB 6 paces and back to bed using a FWW Min A    Modality Interventions   Planned Modality Interventions Cryotherapy   General Therapy Interventions   Planned Therapy Interventions bed mobility training;gait training;strengthening;transfer training;home program guidelines   Clinical Impression   Criteria for Skilled Therapeutic Intervention yes, treatment indicated   PT Diagnosis pain, weakness, gait abnormaility   Influenced by the following impairments pain, weakness   Functional limitations due to impairments gait, transfer and bed mobility deficits   Clinical Presentation Stable/Uncomplicated   Clinical Presentation Rationale Pt Presents s/p LTHA without comorbidities   Clinical Decision Making (Complexity) Low complexity   Therapy Frequency 2x/day   Predicted Duration of Therapy Intervention (days/wks) 3 days    Anticipated Discharge Disposition Home with Assist   Risk & Benefits of therapy have been explained Yes   Patient, Family & other staff in agreement with plan of care Yes   Total Evaluation Time   Total Evaluation Time (Minutes) 30

## 2019-10-25 NOTE — PLAN OF CARE
Patient arrived on floor at 1600 from PACU, patient  A&O x4, lungs sound clear, Bowel sound active, Denied CP, lightheadedness, dizziness, numbness, tingling and SOB, iv fluid infusing, drinking well and output from edwards was 200 ml, edwards removed at 1800, output from SUDHEER  Was 80 ml , able to wiggle toes, CMS intact, pain tolerable and taking oxycodone for pain, ice pack applied to hip, J P intact, abductor foam between legs,  incentive spirometer encouraged and done several times, repositioned and turned in bed, instructed patient on the call light system, up with PT and tolerated well, Capnography on, heels elevated off bed, demonstrates the ability to use call light appropriately, will continue to monitor patient.

## 2019-10-25 NOTE — ANESTHESIA PREPROCEDURE EVALUATION
Anesthesia Pre-Procedure Evaluation    Patient: Licha Birch   MRN:     [id number] Gender:   adult   Age:    54 year old :      1965        Preoperative Diagnosis: Osteoarthritis Left Hip   Procedure(s):  Total Left Hip Arthroplasty     Past Medical History:   Diagnosis Date     Osteoarthritis of left hip       Past Surgical History:   Procedure Laterality Date     ENT SURGERY      right mandible surgery     KNEE SURGERY       TONSILLECTOMY      as a child               J[facility] AN PHYSICAL EXAM    LABS:  CBC:   Lab Results   Component Value Date    WBC 7.4 10/11/2019    WBC 11.4 (H) 2019    HGB 13.8 10/11/2019    HGB 14.4 2019    HCT 42.7 10/11/2019    HCT 44.5 2019     10/11/2019     2019     BMP:   Lab Results   Component Value Date     10/11/2019     2019    POTASSIUM 3.8 10/25/2019    POTASSIUM 4.4 10/11/2019    CHLORIDE 106 10/11/2019    CHLORIDE 106 2019    CO2 28 10/11/2019    CO2 28 2019    BUN 22 10/11/2019    BUN 20 2019    CR 0.71 10/25/2019    CR 0.71 10/11/2019     (H) 10/11/2019     (H) 2019     COAGS: No results found for: PTT, INR, FIBR  POC:   Lab Results   Component Value Date     (H) 10/25/2019    HCG Negative 10/25/2019     OTHER:   Lab Results   Component Value Date    A1C 5.8 (H) 10/11/2019    NATA 9.2 10/11/2019    PHOS 3.5 2019    MAG 2.1 2019    ALKPHOS 120 2019    TSH 3.12 2019    CRP 3.8 2019    SED 10 2019        Preop Vitals    BP Readings from Last 3 Encounters:   10/25/19 (!) 150/96   10/11/19 (!) 178/114    Pulse Readings from Last 3 Encounters:   10/25/19 91   10/11/19 79      Resp Readings from Last 3 Encounters:   10/25/19 18   10/11/19 19   19 16    SpO2 Readings from Last 3 Encounters:   10/25/19 99%   10/11/19 98%      Temp Readings from Last 1 Encounters:   10/25/19 36.4  C (97.5  F) (Oral)    Ht Readings from Last 1 Encounters:  "  10/25/19 1.702 m (5' 7\")      Wt Readings from Last 1 Encounters:   10/25/19 105.3 kg (232 lb 2.3 oz)    Estimated body mass index is 36.36 kg/m  as calculated from the following:    Height as of this encounter: 1.702 m (5' 7\").    Weight as of this encounter: 105.3 kg (232 lb 2.3 oz).     LDA:  Peripheral IV 10/25/19 Left Hand (Active)   Site Assessment WDL 10/25/2019  9:15 AM   Line Status Saline locked 10/25/2019  9:15 AM   Phlebitis Scale 0-->no symptoms 10/25/2019  9:15 AM   Infiltration Scale 0 10/25/2019  9:15 AM   Number of days: 0        Assessment:   ASA SCORE: 2    H&P: History and physical reviewed and following examination; no interval change.   Smoking Status:  Non-Smoker/Unknown   NPO Status: NPO Appropriate     Plan:   Anes. Type:  General   Pre-Medication: None   Induction:  IV (Standard)   Airway: ETT; Oral   Access/Monitoring: PIV   Maintenance: Balanced     Postop Plan:   Postop Pain: Opioids  Postop Sedation/Airway: Not planned  Disposition: Inpatient/Admit     PONV Management:   Adult Risk Factors: Female, Non-Smoker, Postop Opioids   Prevention: Ondansetron, Dexamethasone     CONSENT: Direct conversation   Plan and risks discussed with: Patient   Blood Products: Consented (ALL Blood Products)       Comments for Plan/Consent:  See PAC note for details.                  Jordy Fonseca MD  "

## 2019-10-25 NOTE — ANESTHESIA POSTPROCEDURE EVALUATION
Anesthesia POST Procedure Evaluation    Patient: Licha Birch   MRN:     [id number] Gender:   adult   Age:    54 year old :      1965        Preoperative Diagnosis: Osteoarthritis Left Hip   Procedure(s):  Total Left Hip Arthroplasty   Postop Comments: No value filed.       Anesthesia Type:  Not documented  General    Reportable Event: NO     PAIN: Uncomplicated   Sign Out status: Comfortable, Well controlled pain     PONV: No PONV   Sign Out status:  No Nausea or Vomiting     Neuro/Psych: Uneventful perioperative course   Sign Out Status: Preoperative baseline; Age appropriate mentation     Airway/Resp.: Uneventful perioperative course   Sign Out Status: Non labored breathing, age appropriate RR; Resp. Status within EXPECTED Parameters     CV: Uneventful perioperative course   Sign Out status: Appropriate BP and perfusion indices; Appropriate HR/Rhythm     Disposition:   Sign Out in:  PACU  Disposition:  Floor  Recovery Course: Uneventful  Follow-Up: Not required           Last Anesthesia Record Vitals:  CRNA VITALS  10/25/2019 1254 - 10/25/2019 1354      10/25/2019             Pulse:  97    SpO2:  100 %          Last PACU Vitals:  Vitals Value Taken Time   /74 10/25/2019  3:15 PM   Temp 36.3  C (97.3  F) 10/25/2019  2:15 PM   Pulse 83 10/25/2019  3:15 PM   Resp 9 10/25/2019  3:17 PM   SpO2 97 % 10/25/2019  3:17 PM   Temp src     NIBP     Pulse     SpO2     Resp     Temp     Ht Rate     Temp 2     Vitals shown include unvalidated device data.      Electronically Signed By: Jordy Fonseca MD, 2019, 3:18 PM

## 2019-10-26 ENCOUNTER — APPOINTMENT (OUTPATIENT)
Dept: PHYSICAL THERAPY | Facility: CLINIC | Age: 54
DRG: 470 | End: 2019-10-26
Attending: ORTHOPAEDIC SURGERY
Payer: COMMERCIAL

## 2019-10-26 ENCOUNTER — APPOINTMENT (OUTPATIENT)
Dept: OCCUPATIONAL THERAPY | Facility: CLINIC | Age: 54
DRG: 470 | End: 2019-10-26
Attending: ORTHOPAEDIC SURGERY
Payer: COMMERCIAL

## 2019-10-26 LAB
ANION GAP SERPL CALCULATED.3IONS-SCNC: 4 MMOL/L (ref 3–14)
BUN SERPL-MCNC: 24 MG/DL (ref 7–30)
CALCIUM SERPL-MCNC: 8.5 MG/DL (ref 8.5–10.1)
CHLORIDE SERPL-SCNC: 103 MMOL/L (ref 94–109)
CO2 SERPL-SCNC: 27 MMOL/L (ref 20–32)
CREAT SERPL-MCNC: 0.78 MG/DL (ref 0.52–1.04)
GFR SERPL CREATININE-BSD FRML MDRD: 85 ML/MIN/{1.73_M2}
GLUCOSE SERPL-MCNC: 129 MG/DL (ref 70–99)
HGB BLD-MCNC: 11.3 G/DL (ref 11.7–15.7)
POTASSIUM SERPL-SCNC: 4.3 MMOL/L (ref 3.4–5.3)
SODIUM SERPL-SCNC: 134 MMOL/L (ref 133–144)

## 2019-10-26 PROCEDURE — 97530 THERAPEUTIC ACTIVITIES: CPT | Mod: GO | Performed by: OCCUPATIONAL THERAPIST

## 2019-10-26 PROCEDURE — 36415 COLL VENOUS BLD VENIPUNCTURE: CPT | Performed by: STUDENT IN AN ORGANIZED HEALTH CARE EDUCATION/TRAINING PROGRAM

## 2019-10-26 PROCEDURE — 97110 THERAPEUTIC EXERCISES: CPT | Mod: GP

## 2019-10-26 PROCEDURE — 97165 OT EVAL LOW COMPLEX 30 MIN: CPT | Mod: GO | Performed by: OCCUPATIONAL THERAPIST

## 2019-10-26 PROCEDURE — 40000193 ZZH STATISTIC PT WARD VISIT

## 2019-10-26 PROCEDURE — 80048 BASIC METABOLIC PNL TOTAL CA: CPT | Performed by: STUDENT IN AN ORGANIZED HEALTH CARE EDUCATION/TRAINING PROGRAM

## 2019-10-26 PROCEDURE — 12000001 ZZH R&B MED SURG/OB UMMC

## 2019-10-26 PROCEDURE — 99231 SBSQ HOSP IP/OBS SF/LOW 25: CPT | Performed by: INTERNAL MEDICINE

## 2019-10-26 PROCEDURE — 25000128 H RX IP 250 OP 636: Performed by: STUDENT IN AN ORGANIZED HEALTH CARE EDUCATION/TRAINING PROGRAM

## 2019-10-26 PROCEDURE — 25000132 ZZH RX MED GY IP 250 OP 250 PS 637: Performed by: STUDENT IN AN ORGANIZED HEALTH CARE EDUCATION/TRAINING PROGRAM

## 2019-10-26 PROCEDURE — 97530 THERAPEUTIC ACTIVITIES: CPT | Mod: GP

## 2019-10-26 PROCEDURE — 85018 HEMOGLOBIN: CPT | Performed by: STUDENT IN AN ORGANIZED HEALTH CARE EDUCATION/TRAINING PROGRAM

## 2019-10-26 PROCEDURE — 97116 GAIT TRAINING THERAPY: CPT | Mod: GP

## 2019-10-26 PROCEDURE — 97535 SELF CARE MNGMENT TRAINING: CPT | Mod: GO | Performed by: OCCUPATIONAL THERAPIST

## 2019-10-26 RX ORDER — AMLODIPINE BESYLATE 5 MG/1
5 TABLET ORAL DAILY
Status: DISCONTINUED | OUTPATIENT
Start: 2019-10-26 | End: 2019-10-27 | Stop reason: HOSPADM

## 2019-10-26 RX ORDER — OXYCODONE HYDROCHLORIDE 5 MG/1
5-10 TABLET ORAL
Qty: 50 TABLET | Refills: 0 | Status: SHIPPED | OUTPATIENT
Start: 2019-10-26 | End: 2019-10-27

## 2019-10-26 RX ADMIN — OXYCODONE HYDROCHLORIDE 10 MG: 5 TABLET ORAL at 05:30

## 2019-10-26 RX ADMIN — ACETAMINOPHEN 975 MG: 325 TABLET, FILM COATED ORAL at 16:57

## 2019-10-26 RX ADMIN — OXYCODONE HYDROCHLORIDE 10 MG: 5 TABLET ORAL at 23:58

## 2019-10-26 RX ADMIN — OXYCODONE HYDROCHLORIDE 10 MG: 5 TABLET ORAL at 17:54

## 2019-10-26 RX ADMIN — MAGNESIUM HYDROXIDE 15 ML: 400 SUSPENSION ORAL at 08:43

## 2019-10-26 RX ADMIN — ASPIRIN 325 MG: 325 TABLET, DELAYED RELEASE ORAL at 08:43

## 2019-10-26 RX ADMIN — OXYCODONE HYDROCHLORIDE 10 MG: 5 TABLET ORAL at 20:52

## 2019-10-26 RX ADMIN — ACETAMINOPHEN 975 MG: 325 TABLET, FILM COATED ORAL at 01:01

## 2019-10-26 RX ADMIN — ACETAMINOPHEN 975 MG: 325 TABLET, FILM COATED ORAL at 08:43

## 2019-10-26 RX ADMIN — OXYCODONE HYDROCHLORIDE 10 MG: 5 TABLET ORAL at 11:48

## 2019-10-26 RX ADMIN — ASPIRIN 325 MG: 325 TABLET, DELAYED RELEASE ORAL at 20:52

## 2019-10-26 RX ADMIN — OXYCODONE HYDROCHLORIDE 10 MG: 5 TABLET ORAL at 08:43

## 2019-10-26 RX ADMIN — OXYCODONE HYDROCHLORIDE 10 MG: 5 TABLET ORAL at 14:42

## 2019-10-26 RX ADMIN — MAGNESIUM HYDROXIDE 30 ML: 400 SUSPENSION ORAL at 21:23

## 2019-10-26 RX ADMIN — OXYCODONE HYDROCHLORIDE 10 MG: 5 TABLET ORAL at 01:01

## 2019-10-26 RX ADMIN — CEFAZOLIN SODIUM 2 G: 2 INJECTION, SOLUTION INTRAVENOUS at 05:29

## 2019-10-26 ASSESSMENT — ACTIVITIES OF DAILY LIVING (ADL)
ADLS_ACUITY_SCORE: 18
ADLS_ACUITY_SCORE: 17
ADLS_ACUITY_SCORE: 18
ADLS_ACUITY_SCORE: 17
ADLS_ACUITY_SCORE: 17
ADLS_ACUITY_SCORE: 18

## 2019-10-26 NOTE — PLAN OF CARE
Discharge Planner PT   Patient plan for discharge: Home with assist from family  Current status: Patient ambulated with supervision and FWW. Ascends/descends 6 stairs in session with 2 rails vs 1 rail and 1 walking stick with CGA. Performs bed mobility with HOB elevated and toilet transfers with mod I and verbal cues.  Barriers to return to prior living situation: stairs  Recommendations for discharge: home with assist from family, outpatient PT  Rationale for recommendations: Pending stair clearance, patient demonstrates safety with mobility and family is engaged in PT sessions. Patient would be safe to discharge to home with family assist.       Entered by: Miryam Lundberg 10/26/2019 11:07 AM

## 2019-10-26 NOTE — PLAN OF CARE
Discharge Planner OT   Patient plan for discharge: Home with assist from sister and son  Current status: Pt educated on role of OT, POC, hip precautions. Able to state 3/3 precautions. Education provided on LB dressing strategies using AE. Pt has hip kit at home. Completed dressing task SBA w/ vc's. Toilet transfer/task SBA-supervision w/ FWW. Standing g/h SBA w/ reminders about keeping walker close for safety. Pt and daughter educated on tub transfer using extended tub transfer bench. Able to complete w/ Rayne to LLE. Educated on using leg  to swing into tub.Verbalizes understanding. Ambulating well w/ FWW, taking a few rest breaks d/t fatigue in arms. Pt with no other concerns and reports she is comfortable with completing ADLs at home.   Barriers to return to prior living situation: none anticipated  Recommendations for discharge: home with assist from family  Rationale for recommendations: Pt has met all OT goals, no further skilled OT needs.       Entered by: Alvarez Pedroza 10/26/2019 3:22 PM     Occupational Therapy Discharge Summary    Reason for therapy discharge:    All goals and outcomes met, no further needs identified.    Progress towards therapy goal(s). See goals on Care Plan in [facility] electronic health record for goal details.  Goals met    Therapy recommendation(s):    No further therapy is recommended. Home with assist.

## 2019-10-26 NOTE — PLAN OF CARE
VS: Vitals: stable  Capno: stable   O2: Desating at beginning of the shift. Given O2 at 2L: tolerating well.Wean O2 to RA with sats > 90%.IS encouraged.   Output: Voids spontaneously without difficulty.  Last PVR: 0   Last BM: 10/25. Not passing gas: bowels hypoactive in all quadrants   Activity: WBAT: Assist of 1 to bedside commode   Skin: Dry and intact   Pain: Manageable with PRN Oxycodone an dice pack applied   CMS: Intact   Dressing: C/D/I   Diet: Regular   LDA: PIV: L hand SL between abx  SUDHEER: patent   Equipment: Capno, PCD, IV pole and personal belongings    Plan: TBD   Additional Info:

## 2019-10-26 NOTE — PROGRESS NOTES
Orthopaedic Surgery Progress Note:       Subjective:   NAEO. Patient reports doing well. Pain well controlled on current regimen. Denies new onset tingling/numbness in operative extremity. Denies fever/chills/SOB/nause/vomiting. Voids +. BM -. Flatus +.     Objective:   Temp:  [96.5  F (35.8  C)-98.4  F (36.9  C)] 98.3  F (36.8  C)  Pulse:  [83-92] 91  Heart Rate:  [72-95] 72  Resp:  [10-16] 13  BP: ()/() 94/58  SpO2:  [87 %-100 %] 92 %        Gen: NAD. Resting comfortably in bed  Resp: Breathing comfortably on RA  LLE:  Dressing/ACE is c/d/i. Aquacell in place  Drain is in place and patent. Production minimal.  SILT in femoral, saphenous, sural, deep peroneal, superficial peroneal, and tibial n dist.   Fires EHL/FHL/TA/Gastroc with 5/5 strength   PT and DP pulses intact, 2+ and foot is wwp      Labs:  Recent Labs   Lab 10/26/19  0617   HGB 11.3*          Assessment & Plan:   54 year old adult now s/p left ALE  on 10/25/2019 with Dr. Schuster    Activity: WBAT BLE. ROM as tolerated. Posterior hip precautions.   Drain: Will d/c before dischacrge  : Voids spontaneously.  Antibiotics: Ancef  x 24 hours post op for surgical prophylaxis   Dressings: Aquacel to remain in place until POD 7   Diet: ADAT  Pain: PO/IV meds. Transition to PO meds only as patient tolerates  DVT ppx: Aspirin 325mg BID Continue x 4 weeks post op  Imaging: Post op films reviewed and are satisfactory. No further imaging need at this time.  Labs: Daily Hgb  PT/OT: Mobility, ROM, gait training, ADLs  Consults: Appreciate medicine co-management.  Dispo: Pending pain control and PT/OT recs. Expect patient to d/c to home with family on today or tomorrow  Follow up: 4 weeks post op w/ Rashid Schuster MD Paul Hoogervorst MD 10/26/2019  Orthopaedic Surgery Arthroplasty Fellow

## 2019-10-26 NOTE — PROGRESS NOTES
"   10/26/19 1327   Quick Adds   Type of Visit Initial Occupational Therapy Evaluation   Living Environment   Lives With child(schuyler), adult;other relative(s)   Living Arrangements house   Home Accessibility stairs to enter home;stairs within home   Number of Stairs, Main Entrance 3   Stair Railings, Main Entrance railing on left side (ascending)   Number of Stairs, Within Home, Primary other (see comments)  (full flight)   Transportation Anticipated car, drives self;family or friend will provide   Living Environment Comment Tub shower, extended tub bench, grab bars, RTS   Self-Care   Usual Activity Tolerance good   Current Activity Tolerance good   Regular Exercise No   Equipment Currently Used at Home raised toilet;grab bar, tub/shower;cane, quad;walker, standard   Activity/Exercise/Self-Care Comment Pt reports IND w/ all ADLs   Functional Level   Ambulation 1-->assistive equipment   Transferring 0-->independent   Toileting 0-->independent   Bathing 1-->assistive equipment   Dressing 1-->assistive equipment   Eating 0-->independent   Communication 0-->understands/communicates without difficulty   Swallowing 0-->swallows foods/liquids without difficulty   Cognition 0 - no cognition issues reported   Fall history within last six months yes   Number of times patient has fallen within last six months 1   Which of the above functional risks had a recent onset or change? ambulation;fall history   General Information   Onset of Illness/Injury or Date of Surgery - Date 10/25/19   Referring Physician Dr. Schuster   Patient/Family Goals Statement \"I want to do yoga, play with my grandsons, go shopping.\"   Additional Occupational Profile Info/Pertinent History of Current Problem s/p L ALE. See chart for PMH.   Precautions/Limitations fall precautions;left hip precautions   Weight-Bearing Status - LUE full weight-bearing   Weight-Bearing Status - RUE full weight-bearing   Weight-Bearing Status - LLE weight-bearing as tolerated "   Weight-Bearing Status - RLE full weight-bearing   General Observations SUDHEER drain   Cognitive Status Examination   Orientation orientation to person, place and time   Visual Perception   Visual Perception Wears glasses   Sensory Examination   Sensory Comments No N/T noted   Pain Assessment   Patient Currently in Pain Yes, see Vital Sign flowsheet   Range of Motion (ROM)   ROM Comment WFL   Strength   Strength Comments WFL   Muscle Tone Assessment   Muscle Tone Quick Adds No deficits were identified   Coordination   Upper Extremity Coordination No deficits were identified   Mobility   Bed Mobility Bed mobility skill: Sit to supine;Bed mobility skill: Supine to sit   Bed Mobility Skill: Sit to Supine   Level of [place]: Sit/Supine stand-by assist   Bed Mobility Skill: Supine to Sit   Level of [place]: Supine/Sit stand-by assist   Transfer Skills   Transfer Transfer Skill: Stand to Sit   Transfer Skill: Bed to Chair/Chair to Bed   Level of [place]: Bed to Chair stand-by assist   Weight-Bearing Restrictions weight-bearing as tolerated   Assistive Device - Transfer Skill Bed to Chair Chair to Bed Rehab Eval standard walker   Transfer Skill: Sit to Stand   Level of [place]: Sit/Stand stand-by assist   Transfer Skill: Sit to Stand weight-bearing as tolerated   Assistive Device for Transfer: Sit/Stand standard walker   Toilet Transfer   Toilet Transfer Toilet Transfer Skill   Transfer Skill: Toilet Transfer   Level of [place]: Toilet stand-by assist   Weight-Bearing Restrictions: Toilet weight-bearing as tolerated   Assistive Device standard walker;seat riser   Toilet Transfer Skill Comments commode overlay   Tub/Shower Transfer   Tub/Shower Transfer Transfer Skill: Tub/Shower Transfers   Transfer Skill: Tub/Shower Transfer   Level of [place]: Tub/Shower contact guard   Physical Assist/Nonphysical Assist: Tub/Shower verbal cues   Weight-Bearing Restrictions: Tub/Shower weight-bearing as  "tolerated   Assistive Device standard walker;tub bench   Balance   Balance Quick Add No deficits identified   Bathing   Level of [place] stand-by assist   Assistive Device tub transfer bench;grab bars   Upper Body Dressing   Level of [place]: Dress Upper Body stand-by assist   Lower Body Dressing   Level of [place]: Dress Lower Body stand-by assist   Assistive Device reacher;sock-aid;dressing stick   Toileting   Level of [place]: Toilet independent   Assistive Device raised toilet seat   Grooming   Level of [place]: Grooming stand-by assist   Eating/Self Feeding   Level of [place]: Eating independent   Activities of Daily Living Analysis   Impairments Contributing to Impaired Activities of Daily Living pain;post surgical precautions   General Therapy Interventions   Planned Therapy Interventions ADL retraining   Clinical Impression   Criteria for Skilled Therapeutic Interventions Met yes, treatment indicated   OT Diagnosis Impaired ADLs and functional mobility   Influenced by the following impairments pain, post op precautions   Assessment of Occupational Performance 1-3 Performance Deficits   Identified Performance Deficits dressing, toileting, bathing   Clinical Decision Making (Complexity) Low complexity   Therapy Frequency Daily   Predicted Duration of Therapy Intervention (days/wks) 1x eval and treat   Anticipated Equipment Needs at Discharge reacher;sock aide;long shoe horn  (leg )   Anticipated Discharge Disposition Home with Assist   Risks and Benefits of Treatment have been explained. Yes   Patient, Family & other staff in agreement with plan of care Yes   [facility] [employer] TM \"6 Clicks\"   2016, Trustees of [facility], under license to [employer].  All rights reserved.   6 Clicks Short Forms Daily Activity Inpatient Short Form   [facility]  \"6 Clicks\" Daily Activity Inpatient Short Form   1. Putting on and taking off regular lower body " clothing? 4 - None   2. Bathing (including washing, rinsing, drying)? 3 - A Little   3. Toileting, which includes using toilet, bedpan or urinal? 4 - None   4. Putting on and taking off regular upper body clothing? 4 - None   5. Taking care of personal grooming such as brushing teeth? 4 - None   6. Eating meals? 4 - None   Daily Activity Raw Score (Score out of 24.Lower scores equate to lower levels of function) 23   Total Evaluation Time   Total Evaluation Time (Minutes) 8

## 2019-10-26 NOTE — PROGRESS NOTES
[facility]    Medicine Progress Note - Hospitalist Service       Date of Admission:  10/25/2019  Assessment & Plan     54 year old year old adult with hx of HTN, Left hip OA is being admitted s/p Left ALE on 10/25/2019    # s/p Left ALE on 10/25/2019 :  Management primarily per Ortho team.  - Wound cares, Dressings, Surgical pain management, DVT Prophylaxis  per primary team.   - Monitor anemia, hemodynamics, Input/Output  - Encourage Incentive spirometry  - Laxatives for constipation prophylaxis     # HTN: PTA on Amlodipine, Lisinopril-hydrochlorothiazide  BP normal  - resume amlodipine.   - monitor bp    Rest per primary.     The patient's care was discussed with the Patient, Patient's Family and RN.    Ramses Akhtar MD  Hospitalist Service  [facility]    ______________________________________________________________________    Interval History      States doing well  Pain controlled.   Denies fever or chills.   No cough or cp or sob.   No LH or dizziness.   No NV or pain abdomen.   No new sensory or motor complaint.     No other new or acute medical concern    Data reviewed today: I reviewed all medications, new labs and imaging results over the last 24 hours. I personally reviewed no images or EKG's today.    Physical Exam   Vital Signs: Temp: 98.3  F (36.8  C) Temp src: Oral BP: 94/58 Pulse: 91 Heart Rate: 72 Resp: 13 SpO2: 92 % O2 Device: None (Room air) Oxygen Delivery: 2 LPM  Weight: 232 lbs 2.31 oz      General: alert, interactive, NAD  HEENT: AT/NC,  Moist MM  Respi/Chest: Non labored  CVS/Heart: S1S2 regular  GI/Abd:  non distended  MSK/Extremities: Distally warm, well perfused.     Neuro: AO x 4  Psychiatry: Stable mood.         Data   Recent Labs   Lab 10/26/19  0617 10/25/19  0828   HGB 11.3*  --      --    POTASSIUM 4.3 3.8   CHLORIDE 103  --    CO2 27  --    BUN 24  --    CR 0.78 0.71   ANIONGAP 4  --    NATA 8.5  --     *  --      Recent Results (from the past 24 hour(s))   XR Pelvis w Hip Left 1 View    Narrative    2 views left hip/pelvis radiographs 10/25/2019 3:06 PM    History: Status post ALE    Comparison: 10/25/2019 and earlier    Findings:    AP view of pelvis and cross table lateral  views of the left hip were  obtained. Cross table suboptimal due to underpenetration.    Right hip: No acute osseous abnormality.      Severe right hip osteoarthrosis with complete joint space loss.    Left hip: No acute osseous abnormality.      New left total hip arthroplasty with surgical drain and soft tissue  gas.       Impression    Impression: New left total hip arthroplasty.    VIRGILIO FABIO     Medications       acetaminophen  975 mg Oral Q8H     aspirin  325 mg Oral BID     bisacodyl  10 mg Rectal Daily     magnesium hydroxide  15 mL Oral BID     sodium chloride (PF)  3 mL Intracatheter Q8H

## 2019-10-26 NOTE — DISCHARGE SUMMARY
ORTHOPAEDIC DISCHARGE SUMMARY     Date of Admission: 10/25/2019  Date of Discharge: 10/27/2019  Disposition: Home  Staff Physician: Rashid Schuster MD  Primary Care Provider: Joseluis Gross    DISCHARGE DIAGNOSIS:  Osteoarthritis Left Hip    PROCEDURES: Procedure(s):  Total Left Hip Arthroplasty on 10/25/2019    BRIEF HISTORY:  Uncomplicated course after left ALE on 10/25/2019.    HOSPITAL COURSE:    Surgery was without unexpected event. Licha Birch has done well post-operatively. Medicine was consulted post operatively to aid in management of medical comorbidities.  The patient received routine nursing cares and has remained medically stable. Vital signs have remained stable throughout the hospital stay. The patient is tolerating a regular diet without GI distress/nausea or vomiting. Voiding spontaneously in the post-operative period. PT & OT were consulted for evaluation, and all PT/OT goals have been met for safe mobility. The patient's post-operative pain is now controlled on oral medications, which will be available on discharge. Stool softeners have been used while taking pain medications to help prevent constipation. Licha Birch is deemed medically safe to discharge.     Antibiotics:  Ancef given periop and 24 hours postop.   DVT prophylaxis:  Aspirin 325mg daily for 4 weeks  PT Progress:  Has met PT/OT goals for safe mobility.   Pain Meds:  Weaned off all IV pain meds by discharge.  Inpatient Events: No significant events or complications. Discharge orders and instructions as below.    FOLLOWUP:    Follow up with Dr. Schuster at 4 weeks postoperatively.  Future Appointments   Date Time Provider Department [place]   10/26/2019 10:30 AM Miryam Lundberg PT [facility]   10/26/2019  1:30 PM Alvarez Pedroza OT [facility] [place]   10/26/2019  2:00 PM Miryam Lundberg PT [facility] [place] ([address] [place] [id number]). Call [phone] to schedule a  follow-up appointment at this location with your provider if you have not heard confirmation of your appointment in the next 3-5 business days    PLANNED DISCHARGE ORDERS:           Current Discharge Medication List      CONTINUE these medications which have NOT CHANGED    Details   acetaminophen (TYLENOL) 325 MG tablet Take 650 mg by mouth At Bedtime      amLODIPine (NORVASC) 5 MG tablet Take 5 mg by mouth daily      lisinopril-hydrochlorothiazide (PRINZIDE/ZESTORETIC) 20-12.5 MG tablet Take 1 tablet by mouth daily      naproxen sodium 220 MG capsule Take 440 mg by mouth three times a week Pt. Alternates with Advil every morning. Pt. Took 10/11/2019      nicotine (COMMIT) 2 MG lozenge Place 2 mg inside cheek 3 times daily      glucosamine 500 MG CAPS Take 1 tablet by mouth At Bedtime Glucosamine with tumeric      ibuprofen (ADVIL/MOTRIN) 200 MG capsule Take 200 mg by mouth 3 times daily Pt. Alternates with Aleve every morning. Last dose 10/09/2019. 10/11/2019      NEW MED Apply topically 2 times daily Hemp seed cream, used in the morning and bedtime.      Combination of Irene Japonica Seed oil, hemp oil, eucalyptus oil, peppermint, Boswella Extract, grapefruit, licorice, aloe vera, tumeric             No discharge procedures on file.      Michael Clark MD  N Arthroplasty Fellow

## 2019-10-26 NOTE — PLAN OF CARE
"  VS: BP 94/58 (BP Location: Right arm)   Pulse 91   Temp 98.3  F (36.8  C) (Oral)   Resp 13   Ht 1.702 m (5' 7\")   Wt 105.3 kg (232 lb 2.3 oz)   SpO2 92%   BMI 36.36 kg/m     O2: Room air. Lung sounds clear. No complaints of SoB.   Output: Bedside commode voids spontaneously without difficulty.   Last BM: 10/25/2019. Given miralax and senna. Supository denied. Passing gas.   Activity: Up w/ SBA, using gait belt and walker.   Skin: Intact. Incision from LTHA CDI.   Pain: Pain well managed with oxycodone.    CMS: Intact. AO x 4. Pt reports no numbness or tingling in extremities.   Dressing: Dressing on Left hip CDI. Dressing from bulb suction CDI.   Diet: Regular   LDA: SUDHEER drain to bulb suction. PIV in left hand.   Equipment: SUDHEER drain. Abduction pillow.    Plan: Possible discharge today or tomorrow depending on PT and OT approval.   Additional Info:        "

## 2019-10-27 ENCOUNTER — APPOINTMENT (OUTPATIENT)
Dept: PHYSICAL THERAPY | Facility: CLINIC | Age: 54
DRG: 470 | End: 2019-10-27
Attending: ORTHOPAEDIC SURGERY
Payer: COMMERCIAL

## 2019-10-27 ENCOUNTER — PATIENT OUTREACH (OUTPATIENT)
Dept: CARE COORDINATION | Facility: CLINIC | Age: 54
End: 2019-10-27

## 2019-10-27 VITALS
OXYGEN SATURATION: 95 % | RESPIRATION RATE: 16 BRPM | BODY MASS INDEX: 36.44 KG/M2 | HEART RATE: 91 BPM | DIASTOLIC BLOOD PRESSURE: 60 MMHG | SYSTOLIC BLOOD PRESSURE: 125 MMHG | HEIGHT: 67 IN | TEMPERATURE: 99.3 F | WEIGHT: 232.14 LBS

## 2019-10-27 LAB
ANION GAP SERPL CALCULATED.3IONS-SCNC: 4 MMOL/L (ref 3–14)
BUN SERPL-MCNC: 22 MG/DL (ref 7–30)
CALCIUM SERPL-MCNC: 8.2 MG/DL (ref 8.5–10.1)
CHLORIDE SERPL-SCNC: 105 MMOL/L (ref 94–109)
CO2 SERPL-SCNC: 28 MMOL/L (ref 20–32)
CREAT SERPL-MCNC: 0.7 MG/DL (ref 0.52–1.04)
GFR SERPL CREATININE-BSD FRML MDRD: >90 ML/MIN/{1.73_M2}
GLUCOSE SERPL-MCNC: 100 MG/DL (ref 70–99)
HGB BLD-MCNC: 11.2 G/DL (ref 11.7–15.7)
POTASSIUM SERPL-SCNC: 4.4 MMOL/L (ref 3.4–5.3)
SODIUM SERPL-SCNC: 137 MMOL/L (ref 133–144)

## 2019-10-27 PROCEDURE — 85018 HEMOGLOBIN: CPT | Performed by: STUDENT IN AN ORGANIZED HEALTH CARE EDUCATION/TRAINING PROGRAM

## 2019-10-27 PROCEDURE — 40000193 ZZH STATISTIC PT WARD VISIT

## 2019-10-27 PROCEDURE — 36415 COLL VENOUS BLD VENIPUNCTURE: CPT | Performed by: STUDENT IN AN ORGANIZED HEALTH CARE EDUCATION/TRAINING PROGRAM

## 2019-10-27 PROCEDURE — 25000132 ZZH RX MED GY IP 250 OP 250 PS 637: Performed by: STUDENT IN AN ORGANIZED HEALTH CARE EDUCATION/TRAINING PROGRAM

## 2019-10-27 PROCEDURE — 80048 BASIC METABOLIC PNL TOTAL CA: CPT | Performed by: STUDENT IN AN ORGANIZED HEALTH CARE EDUCATION/TRAINING PROGRAM

## 2019-10-27 PROCEDURE — 99207 ZZC CDG-MDM COMPONENT: MEETS LOW - DOWN CODED: CPT | Performed by: INTERNAL MEDICINE

## 2019-10-27 PROCEDURE — 97116 GAIT TRAINING THERAPY: CPT | Mod: GP

## 2019-10-27 PROCEDURE — 97110 THERAPEUTIC EXERCISES: CPT | Mod: GP

## 2019-10-27 PROCEDURE — 99231 SBSQ HOSP IP/OBS SF/LOW 25: CPT | Performed by: INTERNAL MEDICINE

## 2019-10-27 RX ORDER — LISINOPRIL AND HYDROCHLOROTHIAZIDE 12.5; 2 MG/1; MG/1
1 TABLET ORAL DAILY
COMMUNITY
Start: 2019-10-28 | End: 2023-06-02

## 2019-10-27 RX ORDER — ASPIRIN 325 MG
325 TABLET, DELAYED RELEASE (ENTERIC COATED) ORAL 2 TIMES DAILY
Qty: 50 TABLET | Refills: 0 | Status: ON HOLD | OUTPATIENT
Start: 2019-10-27 | End: 2020-01-18

## 2019-10-27 RX ORDER — ASPIRIN 325 MG
325 TABLET, DELAYED RELEASE (ENTERIC COATED) ORAL 2 TIMES DAILY
Qty: 50 TABLET | Refills: 0 | Status: SHIPPED | OUTPATIENT
Start: 2019-10-27 | End: 2019-10-27

## 2019-10-27 RX ORDER — OXYCODONE HYDROCHLORIDE 5 MG/1
5 TABLET ORAL EVERY 4 HOURS PRN
Qty: 40 TABLET | Refills: 0 | Status: ON HOLD | OUTPATIENT
Start: 2019-10-27 | End: 2020-01-18

## 2019-10-27 RX ADMIN — ASPIRIN 325 MG: 325 TABLET, DELAYED RELEASE ORAL at 08:59

## 2019-10-27 RX ADMIN — ACETAMINOPHEN 975 MG: 325 TABLET, FILM COATED ORAL at 08:59

## 2019-10-27 RX ADMIN — OXYCODONE HYDROCHLORIDE 5 MG: 5 TABLET ORAL at 09:40

## 2019-10-27 RX ADMIN — ACETAMINOPHEN 975 MG: 325 TABLET, FILM COATED ORAL at 01:05

## 2019-10-27 RX ADMIN — OXYCODONE HYDROCHLORIDE 5 MG: 5 TABLET ORAL at 10:52

## 2019-10-27 RX ADMIN — OXYCODONE HYDROCHLORIDE 10 MG: 5 TABLET ORAL at 02:58

## 2019-10-27 RX ADMIN — OXYCODONE HYDROCHLORIDE 5 MG: 5 TABLET ORAL at 05:58

## 2019-10-27 ASSESSMENT — ACTIVITIES OF DAILY LIVING (ADL)
ADLS_ACUITY_SCORE: 17

## 2019-10-27 NOTE — PROGRESS NOTES
Orthopaedic Surgery Progress Note:       Subjective:   NAEO. Patient reports doing well. Pain well controlled on current regimen. Denies new onset tingling/numbness in operative extremity. Denies fever/chills/SOB/nause/vomiting. Voids +. BM -. Flatus +. Walked starirs with PT. Will discharge today.    Objective:   Temp:  [97.7  F (36.5  C)-99.3  F (37.4  C)] 99.3  F (37.4  C)  Heart Rate:  [89-91] 89  Resp:  [14-16] 16  BP: (113-125)/(60-64) 125/60  SpO2:  [94 %-95 %] 95 %        Gen: NAD. Resting comfortably in bed  Resp: Breathing comfortably on RA  LLE:  Dressing/ACE is c/d/i. Aquacell in place  Drain is in place and patent. Production minimal.  SILT in femoral, saphenous, sural, deep peroneal, superficial peroneal, and tibial n dist.   Fires EHL/FHL/TA/Gastroc with 5/5 strength   PT and DP pulses intact, 2+ and foot is wwp      Labs:  Recent Labs   Lab 10/27/19  0614 10/26/19  0617   HGB 11.2* 11.3*          Assessment & Plan:   54 year old adult now s/p left ALE  on 10/25/2019 with Dr. Schuster    Activity: WBAT BLE. ROM as tolerated. Posterior hip precautions.   Drain: Removed today.  : Voids spontaneously.  Antibiotics: Ancef  x 24 hours post op for surgical prophylaxis   Dressings: Aquacel to remain in place until POD 7   Diet: ADAT  Pain: PO/IV meds. Transition to PO meds only as patient tolerates  DVT ppx: Aspirin 325mg BID Continue x 4 weeks post op  Imaging: Post op films reviewed and are satisfactory. No further imaging need at this time.  Labs: Daily Hgb  PT/OT: Mobility, ROM, gait training, ADLs  Consults: Appreciate medicine co-management.  Dispo: Pending pain control and PT/OT recs. Expect patient to d/c to home with family on today or tomorrow  Follow up: 4 weeks post op w/ Rashid Schuster MD Paul Hoogervorst MD 10/27/2019  Orthopaedic Surgery Arthroplasty Fellow

## 2019-10-27 NOTE — PLAN OF CARE
"  VS: Blood pressure 113/64, pulse 91, temperature 98.7  F (37.1  C), temperature source Oral, resp. rate 14, height 1.702 m (5' 7\"), weight 105.3 kg (232 lb 2.3 oz), SpO2 95 %.  Pt denies CP, SOB, N/T, nausea.    O2: Room air. LS CTA. Using IS.   Output: Voids spontaneously using bathroom.    Last BM: 10/25/19. Passing gas. BS active. No abd discomfort. Declined suppository. Requested additional dose of milk of magnesia, given at HS.    Activity: SBA, walker, gait belt.   Skin: Intact except for incision.   Pain: Managed with 10 mg oxy q3h, ice.    CMS: Intact. AxO, calls appropriately.    Dressing: CDI. Scant drainage noted.    Diet: Regular.   LDA: SUDHEER to bulb suction: 45 out this shift. L PIV SL.   Equipment: Abduction wedge. PCDs.    Plan: Planning to discharge tomorrow to home with help of sister and son.    Additional Info: Pleasant and cooperative with cares.        "

## 2019-10-27 NOTE — PLAN OF CARE
Discharge Planner PT   Patient plan for discharge: home with assist from family, outpatient PT  Current status: Patient ambulated 150' x 2 with FWW and mod I. Patient very stiff this morning, initially demonstrating very short step/stride length, improving with distance. Able to ascend/descend 9 stairs with Lt rail and Rt cane, SBA. Demonstrated mod I bed mobility and transfers, recalls 3/3 hip precautions. Patient ind with 3 exercises from HEP, verbal cues and min assist to complete 4 exercises.  Barriers to return to prior living situation: none  Recommendations for discharge: home with assist from family, outpatient PT  Rationale for recommendations: Patient demonstrates safe functional mobility to return to home, but would benefit from outpatient PT to progress independence with gait and continue functional strengthening.       Entered by: Miryam Lundberg 10/27/2019 10:53 AM       Physical Therapy Discharge Summary    Reason for therapy discharge:    Discharged to home with outpatient therapy.    Progress towards therapy goal(s). See goals on Care Plan in [facility] electronic health record for goal details.  Goals met    Therapy recommendation(s):    Continued therapy is recommended.  Rationale/Recommendations:  Patient would benefit from outpatient PT to progress to PLOF and improve independence with gait and functional mobility..

## 2019-10-27 NOTE — PLAN OF CARE
Pt. Discharged home today at 1245 with daughter. Reviewed AVS with pt and her daughter, answered questions. I will print off another copy of the AVS to be faxed along with the FMLA form in the envelope at the  to be sent to the fax number at the top of the FMLA form. Patient will  medication at the the pharmacy of her choice. PIV and SUDHEER bulb drain removed.

## 2019-10-27 NOTE — PROGRESS NOTES
[facility]    Medicine Progress Note - Hospitalist Service       Date of Admission:  10/25/2019  Assessment & Plan     54 year old year old adult with hx of HTN, Left hip OA is being admitted s/p Left ALE on 10/25/2019    # s/p Left ALE on 10/25/2019 :  Management primarily per Ortho team.  - Wound cares, Dressings, Surgical pain management, DVT Prophylaxis  per primary team.   - Monitor anemia, hemodynamics, Input/Output  - Encourage Incentive spirometry  - Laxatives for constipation prophylaxis     # HTN: PTA on Amlodipine, Lisinopril-hydrochlorothiazide  BP normal  - resume amlodipine.  - May resume lisinopril hydrochlorothiazide in 1-2 days. Pt counseled.    - monitor bp    Rest per primary.     The patient's care was discussed with the Patient, Patient's Family and RN.    Ramses Akhtar MD  Hospitalist Service  [facility]    ______________________________________________________________________    Interval History      States doing well  Pain controlled.   Denies fever or chills.   No cough or cp or sob.   No LH or dizziness.   No NV or pain abdomen.   No new sensory or motor complaint.   Flatus+    No other new or acute medical concern    Data reviewed today: I reviewed all medications, new labs and imaging results over the last 24 hours. I personally reviewed no images or EKG's today.    Physical Exam   Vital Signs: Temp: 99.3  F (37.4  C) Temp src: Oral BP: 125/60   Heart Rate: 89 Resp: 16 SpO2: 95 % O2 Device: None (Room air)    Weight: 232 lbs 2.31 oz      General: alert, interactive, NAD  HEENT: AT/NC,  Moist MM  Respi/Chest: Non labored CTA bl  CVS/Heart: S1S2 regular  GI/Abd:  Soft. NT, non distended bs+  MSK/Extremities: Distally warm, well perfused.     Neuro: AO x 4  Psychiatry: Stable mood.         Data   Recent Labs   Lab 10/27/19  0614 10/26/19  0617 10/25/19  0828   HGB 11.2* 11.3*  --     134  --    POTASSIUM 4.4  4.3 3.8   CHLORIDE 105 103  --    CO2 28 27  --    BUN 22 24  --    CR 0.70 0.78 0.71   ANIONGAP 4 4  --    NATA 8.2* 8.5  --    * 129*  --      No results found for this or any previous visit (from the past 24 hour(s)).  Medications       acetaminophen  975 mg Oral Q8H     amLODIPine  5 mg Oral Daily     aspirin  325 mg Oral BID     bisacodyl  10 mg Rectal Daily     sodium chloride (PF)  3 mL Intracatheter Q8H

## 2019-10-27 NOTE — PLAN OF CARE
VS: Temp: 98.7  F (37.1  C) Temp src: Oral BP: 113/64   Heart Rate: 90 Resp: 14 SpO2: 95 % O2 Device: None (Room air)       O2: Stable on RA   Output: Voiding spontaneously and without difficulty.  SUDHEER: 45 mL   Last BM: 10/25/2019   Activity: Up to bathroom   Skin: Ecchymotic, LLE edematous +2, tennis ball sized mass on left side of lower back pt reports that it has been there for many years and is not a concern. Drain in      Pain: Well controled with 10 mg oxy po q3h prn and scheduled tylenol   CMS: Intact. Pt denies numbness and tingling BLE   Dressing: Scant drainage. Sanguinous in appearance    Diet: Regular   LDA: SUDHEER left hip. PIV LUE SL   Equipment: Abducter pillow in place, PCD boots   Plan: Monitor throughout shift and intervene when necessary, discharge 10/27/2019 to home with support from son and sister   Additional Info: Pt's daughter will be pt's ride home after discharge. Pt wants to have BM before discharge.

## 2019-10-27 NOTE — PLAN OF CARE
"  VS: /60 (BP Location: Left arm)   Pulse 91   Temp 99.3  F (37.4  C) (Oral)   Resp 16   Ht 1.702 m (5' 7\")   Wt 105.3 kg (232 lb 2.3 oz)   SpO2 95%   BMI 36.36 kg/m     O2: Room air no complaints of SoB. Lung sounds clear.   Output: Voids spontaneously in bathrooom without difficulty.   Last BM: 10/25/2019   Activity: Up w/ SBA using gait belt and walker   Skin: Intact. Incision on on left hip from surgery.   Pain: Managed with oxycodone and tylenol   CMS: Intact. AO x 4. No complaints of numbness or tingling.    Dressing: L hip CDI.   Diet: Regular. Good appetite. Tolerating oral liquids.   LDA: SUDHEER drain removed. PIV removed.   Equipment: Hip abduction pillow not currently in use.   Plan: Discharge home today with daughter.   Additional Info: Pt. Sitting up in chair.       "

## 2019-10-28 NOTE — PROGRESS NOTES
[facility]: Post-Discharge Note  SITUATION                                                      Admission:    Admission Date: 10/25/19   Reason for Admission: Osteoarthritis Left Hip  Discharge:   Discharge Date: 10/27/19  Discharge Diagnosis: Osteoarthritis Left Hip  Discharge Service: Orthopedics     BACKGROUND                                                      Licha Birch is a 54 year old adult who presents for pre-operative H & P in preparation for Total Left Hip Arthroplasty on 10/25/19 with Dr. Schuster at [facility] under general anesthesia.  Ms. Valenzuela was seen by Dr. Schuster in orthopedic consultation on 1/21/19 for complaints of bilateral hip pain.  Records indicate that she was found to have severe osteoarthritis of bilateral hips.  The left side was found to be worse with being end-stage in nature with complete loss of cartilage thickness and subchondral cyst formation.  At that visit, she reported her pain to be minimal despite the severe nature of her osteoarthritis.  She had been managing the pain with non-operative measures including steroid injections, chiropractic care and massage therapy.  Surgery was not indicated at that time; however, Dr. Schuster's note indicated that he suspects her symptoms will become progressively worse and she will wish to consider a left hip replacement surgery.     Ms. Birch followed up with Dr. Schuster on 8/15/19 as left hip pain symptoms have worsened. She is using the assistance of a walking stick.  Through the evaluation, Dr. Schuster recommended the above surgical intervention.   Ms. Cárdenas opted to proceed.     ASSESSMENT      Discharge Assessment  Patient reports symptoms are: Unchanged  Does the patient have all of their medications?: Yes  Does patient know what their new medications are for?: Yes  Does patient have a follow-up appointment scheduled?: No  Does patient have any other questions or concerns?: No    Post-op  Did the patient  have surgery or a procedure: Yes  Incision: healing;closed  Drainage: No  Bleeding: none  Fever: No  Chills: No  Redness: No  Warmth: No  Swelling: No  Incision site pain: No  Eating & Drinking: eating and drinking without complaints/concerns  PO Intake: regular diet  Bowel Function: constipation  Urinary Status: voiding without complaint/concerns    PLAN                                                      Outpatient Plan:  Follow up with Dr. Schuster at 4 weeks postoperatively.    No future appointments.        Charleen Hammer, CMA

## 2019-11-01 ENCOUNTER — NURSE TRIAGE (OUTPATIENT)
Dept: NURSING | Facility: CLINIC | Age: 54
End: 2019-11-01

## 2019-11-02 NOTE — TELEPHONE ENCOUNTER
Pt calls in with concern of removing dressing from hip incision site     Says steri strips are coming off as well    Pt advised this is ok   But to re-read her AVS again for specific instructions    Pt also advised to elevate leg with complaint of slight swelling in that leg     Protocol and care advice reviewed  Caller states understanding of the recommended disposition  Advised to call back if further questions or concerns    Wagner Bonds , RN / [facility]    Reason for Disposition    Health Information question, no triage required and triager able to answer question    Protocols used: INFORMATION ONLY CALL-A-AH

## 2019-11-04 ENCOUNTER — THERAPY VISIT (OUTPATIENT)
Dept: PHYSICAL THERAPY | Facility: CLINIC | Age: 54
End: 2019-11-04
Attending: ORTHOPAEDIC SURGERY
Payer: COMMERCIAL

## 2019-11-04 DIAGNOSIS — Z98.890 STATUS POST HIP SURGERY: ICD-10-CM

## 2019-11-04 DIAGNOSIS — M16.12 PRIMARY OSTEOARTHRITIS OF LEFT HIP: Primary | ICD-10-CM

## 2019-11-04 PROCEDURE — 97161 PT EVAL LOW COMPLEX 20 MIN: CPT | Mod: GP | Performed by: PHYSICAL THERAPIST

## 2019-11-04 PROCEDURE — 97110 THERAPEUTIC EXERCISES: CPT | Mod: GP | Performed by: PHYSICAL THERAPIST

## 2019-11-04 ASSESSMENT — ACTIVITIES OF DAILY LIVING (ADL)
HEAVY_WORK: UNABLE TO DO
GOING_DOWN_1_FLIGHT_OF_STAIRS: MODERATE DIFFICULTY
PUTTING_ON_SOCKS_AND_SHOES: EXTREME DIFFICULTY
WALKING_DOWN_STEEP_HILLS: MODERATE DIFFICULTY
HOS_ADL_HIGHEST_POTENTIAL_SCORE: 68
WALKING_INITIALLY: SLIGHT DIFFICULTY
GETTING_INTO_AND_OUT_OF_AN_AVERAGE_CAR: MODERATE DIFFICULTY
WALKING_APPROXIMATELY_10_MINUTES: MODERATE DIFFICULTY
RECREATIONAL_ACTIVITIES: MODERATE DIFFICULTY
WALKING_15_MINUTES_OR_GREATER: MODERATE DIFFICULTY
TWISTING/PIVOTING_ON_INVOLVED_LEG: MODERATE DIFFICULTY
SITTING_FOR_15_MINUTES: NO DIFFICULTY AT ALL
LIGHT_TO_MODERATE_WORK: MODERATE DIFFICULTY
STEPPING_UP_AND_DOWN_CURBS: MODERATE DIFFICULTY
HOS_ADL_ITEM_SCORE_TOTAL: 31
DEEP_SQUATTING: UNABLE TO DO
ROLLING_OVER_IN_BED: MODERATE DIFFICULTY
HOW_WOULD_YOU_RATE_YOUR_CURRENT_LEVEL_OF_FUNCTION_DURING_YOUR_USUAL_ACTIVITIES_OF_DAILY_LIVING_FROM_0_TO_100_WITH_100_BEING_YOUR_LEVEL_OF_FUNCTION_PRIOR_TO_YOUR_HIP_PROBLEM_AND_0_BEING_THE_INABILITY_TO_PERFORM_ANY_OF_YOUR_USUAL_DAILY_ACTIVITIES?: 50
HOS_ADL_COUNT: 17
WALKING_UP_STEEP_HILLS: MODERATE DIFFICULTY
GETTING_INTO_AND_OUT_OF_A_BATHTUB: MODERATE DIFFICULTY
HOS_ADL_SCORE(%): 45.59
STANDING_FOR_15_MINUTES: MODERATE DIFFICULTY
GOING_UP_1_FLIGHT_OF_STAIRS: MODERATE DIFFICULTY

## 2019-11-09 ENCOUNTER — HEALTH MAINTENANCE LETTER (OUTPATIENT)
Age: 54
End: 2019-11-09

## 2019-11-11 ENCOUNTER — THERAPY VISIT (OUTPATIENT)
Dept: PHYSICAL THERAPY | Facility: CLINIC | Age: 54
End: 2019-11-11
Payer: COMMERCIAL

## 2019-11-11 DIAGNOSIS — Z98.890 STATUS POST HIP SURGERY: ICD-10-CM

## 2019-11-11 DIAGNOSIS — M16.12 PRIMARY OSTEOARTHRITIS OF LEFT HIP: ICD-10-CM

## 2019-11-11 PROCEDURE — 97530 THERAPEUTIC ACTIVITIES: CPT | Mod: GP | Performed by: PHYSICAL THERAPIST

## 2019-11-11 PROCEDURE — 97110 THERAPEUTIC EXERCISES: CPT | Mod: GP | Performed by: PHYSICAL THERAPIST

## 2019-11-18 ENCOUNTER — THERAPY VISIT (OUTPATIENT)
Dept: PHYSICAL THERAPY | Facility: CLINIC | Age: 54
End: 2019-11-18
Payer: COMMERCIAL

## 2019-11-18 DIAGNOSIS — Z98.890 STATUS POST HIP SURGERY: ICD-10-CM

## 2019-11-18 DIAGNOSIS — M16.12 PRIMARY OSTEOARTHRITIS OF LEFT HIP: ICD-10-CM

## 2019-11-18 PROCEDURE — 97530 THERAPEUTIC ACTIVITIES: CPT | Mod: GP | Performed by: PHYSICAL THERAPIST

## 2019-11-18 PROCEDURE — 97112 NEUROMUSCULAR REEDUCATION: CPT | Mod: GP | Performed by: PHYSICAL THERAPIST

## 2019-11-18 PROCEDURE — 97110 THERAPEUTIC EXERCISES: CPT | Mod: GP | Performed by: PHYSICAL THERAPIST

## 2019-11-21 DIAGNOSIS — Z98.890 STATUS POST HIP SURGERY: ICD-10-CM

## 2019-11-21 DIAGNOSIS — M16.12 PRIMARY OSTEOARTHRITIS OF LEFT HIP: Primary | ICD-10-CM

## 2019-11-25 ENCOUNTER — OFFICE VISIT (OUTPATIENT)
Dept: ORTHOPEDICS | Facility: CLINIC | Age: 54
End: 2019-11-25
Payer: COMMERCIAL

## 2019-11-25 ENCOUNTER — PREP FOR PROCEDURE (OUTPATIENT)
Dept: ORTHOPEDICS | Facility: CLINIC | Age: 54
End: 2019-11-25

## 2019-11-25 ENCOUNTER — ANCILLARY PROCEDURE (OUTPATIENT)
Dept: GENERAL RADIOLOGY | Facility: CLINIC | Age: 54
End: 2019-11-25
Attending: ORTHOPAEDIC SURGERY
Payer: COMMERCIAL

## 2019-11-25 VITALS — WEIGHT: 234.2 LBS | BODY MASS INDEX: 36.68 KG/M2

## 2019-11-25 DIAGNOSIS — L08.9 WOUND INFECTION: ICD-10-CM

## 2019-11-25 DIAGNOSIS — Z98.890 STATUS POST HIP SURGERY: ICD-10-CM

## 2019-11-25 DIAGNOSIS — Z96.642 STATUS POST LEFT HIP REPLACEMENT: Primary | ICD-10-CM

## 2019-11-25 DIAGNOSIS — T14.8XXA WOUND INFECTION: ICD-10-CM

## 2019-11-25 DIAGNOSIS — M16.11 OSTEOARTHRITIS OF RIGHT HIP: Primary | ICD-10-CM

## 2019-11-25 NOTE — PROGRESS NOTES
[facility]  Orthopaedic Surgery, Joint Replacement Consultation  by Rashid Schuster M.D.    Licha Birch MRN# [id number]   Age: 54 year old YOB: 1965     Requesting physician: Ina Mccallum*  Joseluis Gross   Background history:  DX:  1. Bilateral hip OA     TREATMENTS:  1. 1/2019, 7/2019 - US guided corticosteroid injection right hip (Dr. Jay)  2. 10/25/2019 - Total left hip arthroplasty, Dr. Schuster, [facility]             History of Present Illness:   54 year old adult a month s/p the above procedure presenting today for postoperative evaluation.  Today in the office she states she is doing well.  Her preoperative pain has been resolved.  She still walks with a walking stick.  She is continuing her physical therapy.  She quit her aspirin after 4 weeks.  She is happy with the results and would like to proceed with a right total hip arthroplasty.         Physical Exam:     EXAMINATION pertinent findings:   VITAL SIGNS: There were no vitals taken for this visit.  There is no height or weight on file to calculate BMI.  RESP: non labored breathing   SKIN: grossly normal , incision clean and dry  LYMPHATIC: grossly normal   NEURO: grossly normal   VASCULAR: satisfactory perfusion of all extremities   MUSCULOSKELETAL:   Normal gait.  Range of motion not tested today given her recent procedure.   Affect left lower extremity.         Data:   All laboratory data reviewed  All imaging studies reviewed by me  Adequate positioning of the acetabular component.  Slight valgus of the stem with possible undersizing.  No signs of subsidence.          Assessment and Plan:   Assessment:  54 year old female 1 month s/p total left hip arthroplasty who is recovering appropriately and would like to proceed with right total hip arthroplasty.  Plan:  Discontinue Tylenol if possible.  Plan for right total hip arthroplasty.  Continue physical therapy for range of motion and strengthening exercises.   Follow-up for the left total hip arthroplasty 1 year after surgery.    Michael Clark MD  [facility] Arthroplasty Fellow    Attending MD (Dr. Rashid Schuster) Attestation :  This patient was seen and evaluated by me including a history, exam, and interpretation of all imaging and/or lab data.  Either a training physician (resident/fellow), who also saw the patient, or scribe has documented the clinic visit in the attached note.    Rashid Schuster MD  [facility] Family Professor  Oncology and Adult Reconstructive Surgery  Dept Orthopaedic Surgery, [facility]  [phone] office, [phone] pager  [email or web address]

## 2019-11-25 NOTE — LETTER
11/25/2019       RE: Licha Birch  [address] [address]     Dear Colleague,    Thank you for referring your patient, Licha Birch, to the [facility] at [facility]. Please see a copy of my visit note below.    [facility]  Orthopaedic Surgery, Joint Replacement Consultation  by Rashid Schuster M.D.    Licha Birch MRN# [id number]   Age: 54 year old YOB: 1965     Requesting physician: Ina Mccallum*  Joseluis Gross   Background history:  DX:  1. Bilateral hip OA     TREATMENTS:  1. 1/2019, 7/2019 - US guided corticosteroid injection right hip (Dr. Jay)  2. 10/25/2019 - Total left hip arthroplasty, Dr. Schuster, [facility]             History of Present Illness:   54 year old adult a month s/p the above procedure presenting today for postoperative evaluation.  Today in the office she states she is doing well.  Her preoperative pain has been resolved.  She still walks with a walking stick.  She is continuing her physical therapy.  She quit her aspirin after 4 weeks.  She is happy with the results and would like to proceed with a right total hip arthroplasty.         Physical Exam:     EXAMINATION pertinent findings:   VITAL SIGNS: There were no vitals taken for this visit.  There is no height or weight on file to calculate BMI.  RESP: non labored breathing   SKIN: grossly normal , incision clean and dry  LYMPHATIC: grossly normal   NEURO: grossly normal   VASCULAR: satisfactory perfusion of all extremities   MUSCULOSKELETAL:   Normal gait.  Range of motion not tested today given her recent procedure.   Affect left lower extremity.         Data:   All laboratory data reviewed  All imaging studies reviewed by me  Adequate positioning of the acetabular component.  Slight valgus of the stem with possible undersizing.  No signs of subsidence.          Assessment and Plan:   Assessment:  54 year old female 1 month s/p total  left hip arthroplasty who is recovering appropriately and would like to proceed with right total hip arthroplasty.  Plan:  Discontinue Tylenol if possible.  Plan for right total hip arthroplasty.  Continue physical therapy for range of motion and strengthening exercises.  Follow-up for the left total hip arthroplasty 1 year after surgery.    Michael Clark MD  [facility] Arthroplasty Fellow    Attending MD (Dr. Rashid Schuster) Attestation :  This patient was seen and evaluated by me including a history, exam, and interpretation of all imaging and/or lab data.  Either a training physician (resident/fellow), who also saw the patient, or scribe has documented the clinic visit in the attached note.    MD Rell Saavedra Family Professor  Oncology and Adult Reconstructive Surgery  Dept Orthopaedic Surgery, [facility]  [phone] office, [phone] pager  [email or web address]

## 2019-11-25 NOTE — LETTER
[facility]  [address] [address]  [phone]      November 25, 2019      RE: Licha Birch  [address] [address]       To whom it may concern:    Licha Birch was seen in our clinic today.  Licha Birch may return to work with the following: No restrictions on or about 12/5/2019 with occasional times to rest. Would recommend considering working from home for the first initial 2 months.    Sincerely,      Rashid Schuster MD

## 2019-11-25 NOTE — NURSING NOTE
Teaching Flowsheet    Relevant Diagnosis: Osteoarthritis, Right Hip   Teaching Topic: Right Total Hip Replacement     Person(s) involved in teaching:   Patient     Motivation Level:  Asks Questions: Yes  Eager to Learn: Yes  Cooperative: Yes  Receptive (willing/able to accept information): Yes  Any cultural factors/Jewish beliefs that may influence understanding or compliance? No       Patient demonstrates understanding of the following:  Reason for the appointment, diagnosis and treatment plan: Yes  Knowledge of proper use of medications and conditions for which they are ordered (with special attention to potential side effects or drug interactions): Yes  Which situations necessitate calling provider and whom to: Yes     Teaching Concerns Addressed: NPO status, showering, H & P prior, Joint replacement info/ packet, ABX for joint replacement.       Proper use and care of cane (medical equip, care aids, etc.): Yes  Nutritional needs and diet plan: Yes  Pain management techniques: Yes  Wound Care: Yes  How and/when to access community resources: Yes     Instructional Materials Used/Given: surgical folder with print outs, contact information, Hibiclens given to patient.     Time spent with patient: 15 minutes.

## 2019-11-25 NOTE — NURSING NOTE
Chief Complaint   Patient presents with     Surgical Followup     4wk F/u with new XR S/p Total Left Hip Arthroplasty - Left DOS: 10/25/2019       54 year old  1965    Wt 106.2 kg (234 lb 3.2 oz)   BMI 36.68 kg/m      [id number] - [place], [place] - [facility] [address] [facility] [address]    No Known Allergies    Current Outpatient Medications   Medication     amLODIPine (NORVASC) 5 MG tablet     aspirin (ASA) 325 MG EC tablet     glucosamine 500 MG CAPS     ibuprofen (ADVIL/MOTRIN) 200 MG capsule     lisinopril-hydrochlorothiazide (PRINZIDE/ZESTORETIC) 20-12.5 MG tablet     naproxen sodium 220 MG capsule     NEW MED     acetaminophen (TYLENOL) 325 MG tablet     nicotine (COMMIT) 2 MG lozenge     oxyCODONE (ROXICODONE) 5 MG tablet     No current facility-administered medications for this visit.

## 2019-11-26 ENCOUNTER — THERAPY VISIT (OUTPATIENT)
Dept: PHYSICAL THERAPY | Facility: CLINIC | Age: 54
End: 2019-11-26
Payer: COMMERCIAL

## 2019-11-26 DIAGNOSIS — Z98.890 STATUS POST HIP SURGERY: ICD-10-CM

## 2019-11-26 DIAGNOSIS — M16.12 PRIMARY OSTEOARTHRITIS OF LEFT HIP: ICD-10-CM

## 2019-11-26 PROCEDURE — 97110 THERAPEUTIC EXERCISES: CPT | Mod: GP | Performed by: PHYSICAL THERAPIST

## 2019-12-10 ENCOUNTER — THERAPY VISIT (OUTPATIENT)
Dept: PHYSICAL THERAPY | Facility: CLINIC | Age: 54
End: 2019-12-10
Payer: COMMERCIAL

## 2019-12-10 DIAGNOSIS — M16.12 PRIMARY OSTEOARTHRITIS OF LEFT HIP: ICD-10-CM

## 2019-12-10 DIAGNOSIS — Z98.890 STATUS POST HIP SURGERY: ICD-10-CM

## 2019-12-10 PROCEDURE — 97110 THERAPEUTIC EXERCISES: CPT | Mod: GP | Performed by: PHYSICAL THERAPIST

## 2019-12-10 PROCEDURE — 97112 NEUROMUSCULAR REEDUCATION: CPT | Mod: GP | Performed by: PHYSICAL THERAPIST

## 2019-12-10 ASSESSMENT — ACTIVITIES OF DAILY LIVING (ADL)
RECREATIONAL_ACTIVITIES: SLIGHT DIFFICULTY
LIGHT_TO_MODERATE_WORK: SLIGHT DIFFICULTY
GETTING_INTO_AND_OUT_OF_AN_AVERAGE_CAR: SLIGHT DIFFICULTY
HOS_ADL_ITEM_SCORE_TOTAL: 45
WALKING_UP_STEEP_HILLS: SLIGHT DIFFICULTY
STANDING_FOR_15_MINUTES: SLIGHT DIFFICULTY
SITTING_FOR_15_MINUTES: NO DIFFICULTY AT ALL
WALKING_INITIALLY: SLIGHT DIFFICULTY
TWISTING/PIVOTING_ON_INVOLVED_LEG: SLIGHT DIFFICULTY
HOS_ADL_COUNT: 17
HEAVY_WORK: UNABLE TO DO
STEPPING_UP_AND_DOWN_CURBS: SLIGHT DIFFICULTY
GOING_DOWN_1_FLIGHT_OF_STAIRS: SLIGHT DIFFICULTY
GOING_UP_1_FLIGHT_OF_STAIRS: SLIGHT DIFFICULTY
ROLLING_OVER_IN_BED: SLIGHT DIFFICULTY
PUTTING_ON_SOCKS_AND_SHOES: MODERATE DIFFICULTY
WALKING_APPROXIMATELY_10_MINUTES: SLIGHT DIFFICULTY
GETTING_INTO_AND_OUT_OF_A_BATHTUB: SLIGHT DIFFICULTY
WALKING_15_MINUTES_OR_GREATER: SLIGHT DIFFICULTY
HOS_ADL_SCORE(%): 66.18
HOS_ADL_HIGHEST_POTENTIAL_SCORE: 68
DEEP_SQUATTING: UNABLE TO DO
WALKING_DOWN_STEEP_HILLS: SLIGHT DIFFICULTY

## 2019-12-10 NOTE — PROGRESS NOTES
Pt is independent with a HEP and good candidate to hold PT until right hip surgery is complete-as this is the limiting factor.

## 2019-12-26 ENCOUNTER — TRANSFERRED RECORDS (OUTPATIENT)
Dept: HEALTH INFORMATION MANAGEMENT | Facility: CLINIC | Age: 54
End: 2019-12-26

## 2019-12-26 ENCOUNTER — RECORDS - HEALTHEAST (OUTPATIENT)
Dept: LAB | Facility: CLINIC | Age: 54
End: 2019-12-26

## 2019-12-26 LAB
ANION GAP SERPL CALCULATED.3IONS-SCNC: 10 MMOL/L (ref 5–18)
BUN SERPL-MCNC: 17 MG/DL (ref 8–22)
CALCIUM SERPL-MCNC: 9.9 MG/DL (ref 8.5–10.5)
CHLORIDE BLD-SCNC: 104 MMOL/L (ref 98–107)
CO2 SERPL-SCNC: 24 MMOL/L (ref 22–31)
CREAT SERPL-MCNC: 0.78 MG/DL (ref 0.6–1.1)
GFR SERPL CREATININE-BSD FRML MDRD: >60 ML/MIN/1.73M2
GLUCOSE BLD-MCNC: 103 MG/DL (ref 70–125)
POTASSIUM BLD-SCNC: 4.5 MMOL/L (ref 3.5–5)
SODIUM SERPL-SCNC: 138 MMOL/L (ref 136–145)

## 2020-01-07 NOTE — PROGRESS NOTES
SURGERY PLAN (PRE-OP PLAN)     Patient Position (indicated by x):  X  Lateral decubitus, Wixson hip positioner   x  Regular OR table   x  Reynolds catheter   X  Revision ALE drape with plastic side bags for leg   X  Blue U drape x2   Blue and white stockinet   Coban   Ioban      ALE Requests (indicated by x):    Echo BiMetric stems   X  Biomet TAPERLOC ingrowth stem      Biomet Integral cemented stem   X  Biomet RB cup, 32 heads     G7 cup      Biomet Bipolar cup      Specimens and cultures (indicated by x):      Tissue cultures, aerobic and anaerobic without gram stain      Frozen section      pathology specimens - fresh      pathology specimens - formalin     Summary:   55 yo female with end stage OA right hip.     Plan:  Primary right ALE     Michael Clark MD  Orthopaedic Surgery, Adult Reconstruction Fellow  Pager [phone]         LEFT ALE COMPONENTS USED:   Biomet Ranawat-Janet acetabular shell, 54  mm diameter.   Liner size 32 mm inner diameter. Elevated lip: Yes  Taperloc femoral stem size 12 mm, normal offset, STD mm neck length, 32-mm diameter head.

## 2020-01-14 ENCOUNTER — TELEPHONE (OUTPATIENT)
Dept: ORTHOPEDICS | Facility: CLINIC | Age: 55
End: 2020-01-14

## 2020-01-14 DIAGNOSIS — M16.12 PRIMARY OSTEOARTHRITIS OF LEFT HIP: ICD-10-CM

## 2020-01-14 DIAGNOSIS — M16.12 PRIMARY OSTEOARTHRITIS OF LEFT HIP: Primary | ICD-10-CM

## 2020-01-14 LAB
ABO + RH BLD: NORMAL
ABO + RH BLD: NORMAL
BLD GP AB SCN SERPL QL: NORMAL
BLOOD BANK CMNT PATIENT-IMP: NORMAL
ERYTHROCYTE [DISTWIDTH] IN BLOOD BY AUTOMATED COUNT: 13.7 % (ref 10–15)
HCT VFR BLD AUTO: 41.9 % (ref 35–47)
HGB BLD-MCNC: 13.6 G/DL (ref 11.7–15.7)
MCH RBC QN AUTO: 31 PG (ref 26.5–33)
MCHC RBC AUTO-ENTMCNC: 32.5 G/DL (ref 31.5–36.5)
MCV RBC AUTO: 95 FL (ref 78–100)
PLATELET # BLD AUTO: 318 10E9/L (ref 150–450)
RBC # BLD AUTO: 4.39 10E12/L (ref 3.8–5.2)
SPECIMEN EXP DATE BLD: NORMAL
WBC # BLD AUTO: 10.3 10E9/L (ref 4–11)

## 2020-01-16 ENCOUNTER — ANESTHESIA EVENT (OUTPATIENT)
Dept: SURGERY | Facility: CLINIC | Age: 55
DRG: 470 | End: 2020-01-16
Payer: COMMERCIAL

## 2020-01-16 NOTE — ANESTHESIA PREPROCEDURE EVALUATION
Anesthesia Pre-Procedure Evaluation    Patient: Licha Birch   MRN:     [id number] Gender:   adult   Age:    54 year old :      1965        Preoperative Diagnosis: Osteoarthritis of right hip [M16.11]   Procedure(s):  Right total hip arthroplasty     Past Medical History:   Diagnosis Date     Osteoarthritis of left hip       Past Surgical History:   Procedure Laterality Date     ARTHROPLASTY HIP Left 10/25/2019    Procedure: Total Left Hip Arthroplasty;  Surgeon: Rashid Schuster MD;  Location: [facility] OR     ENT SURGERY      right mandible surgery     KNEE SURGERY       TONSILLECTOMY      as a child          Anesthesia Evaluation     . Pt has had prior anesthetic. Type: General    No history of anesthetic complications          ROS/MED HX    ENT/Pulmonary:  - neg pulmonary ROS     Neurologic:  - neg neurologic ROS     Cardiovascular:     (+) hypertension----. : . . . :. .       METS/Exercise Tolerance:  >4 METS   Hematologic:         Musculoskeletal:   (+) arthritis,  -       GI/Hepatic:  - neg GI/hepatic ROS       Renal/Genitourinary:  - ROS Renal section negative       Endo:     (+) Obesity (BMI=37), .      Psychiatric:  - neg psychiatric ROS       Infectious Disease:  - neg infectious disease ROS       Malignancy:      - no malignancy   Other:    (+) No chance of pregnancy                        PHYSICAL EXAM:   Mental Status/Neuro: A/A/O   Airway: Facies: Thick Neck  Mallampati: I  Mouth/Opening: Full  TM distance: > 6 cm  Neck ROM: Full   Respiratory: Auscultation: CTAB     Resp. Rate: Normal     Resp. Effort: Normal      CV: Rhythm: Regular  Rate: Age appropriate  Heart: Normal Sounds  Edema: None   Comments:      Dental: Normal Dentition Habitus: Obesity               LABS:  CBC:   Lab Results   Component Value Date    WBC 10.3 2020    WBC 7.4 10/11/2019    HGB 13.6 2020    HGB 11.2 (L) 10/27/2019    HCT 41.9 2020    HCT 42.7 10/11/2019     2020     10/11/2019     BMP:  "  Lab Results   Component Value Date     10/27/2019     10/26/2019    POTASSIUM 4.4 10/27/2019    POTASSIUM 4.3 10/26/2019    CHLORIDE 105 10/27/2019    CHLORIDE 103 10/26/2019    CO2 28 10/27/2019    CO2 27 10/26/2019    BUN 22 10/27/2019    BUN 24 10/26/2019    CR 0.70 10/27/2019    CR 0.78 10/26/2019     (H) 10/27/2019     (H) 10/26/2019     COAGS: No results found for: PTT, INR, FIBR  POC:   Lab Results   Component Value Date     (H) 10/25/2019    HCG Negative 10/25/2019     OTHER:   Lab Results   Component Value Date    A1C 5.8 (H) 10/11/2019    NATA 8.2 (L) 10/27/2019    PHOS 3.5 01/21/2019    MAG 2.1 01/21/2019    ALKPHOS 120 01/21/2019    TSH 3.12 01/21/2019    CRP 3.8 01/02/2019    SED 10 01/02/2019        Preop Vitals    BP Readings from Last 3 Encounters:   10/27/19 125/60   10/11/19 (!) 178/114    Pulse Readings from Last 3 Encounters:   10/25/19 91   10/11/19 79      Resp Readings from Last 3 Encounters:   10/27/19 16   10/11/19 19   01/11/19 16    SpO2 Readings from Last 3 Encounters:   10/27/19 95%   10/11/19 98%      Temp Readings from Last 1 Encounters:   10/27/19 37.4  C (99.3  F) (Oral)    Ht Readings from Last 1 Encounters:   10/25/19 1.702 m (5' 7\")      Wt Readings from Last 1 Encounters:   11/25/19 106.2 kg (234 lb 3.2 oz)    Estimated body mass index is 36.68 kg/m  as calculated from the following:    Height as of 10/25/19: 1.702 m (5' 7\").    Weight as of 11/25/19: 106.2 kg (234 lb 3.2 oz).     LDA:        Assessment:   ASA SCORE: 2    H&P: History and physical reviewed and following examination; no interval change.   Smoking Status:  Non-Smoker/Unknown   NPO Status: NPO Appropriate     Plan:   Anes. Type:  General   Pre-Medication: Acetaminophen; Gabapentin   Induction:  IV (Standard)   Airway: ETT; Oral   Access/Monitoring: PIV   Maintenance: Balanced     Postop Plan:   Postop Pain: Opioids  Postop Sedation/Airway: Not planned     PONV Management:   Adult " Risk Factors: Female, Non-Smoker, Postop Opioids   Prevention: Ondansetron, Dexamethasone     CONSENT: Direct conversation   Plan and risks discussed with: Patient   Blood Products: Consented (ALL Blood Products)       Comments for Plan/Consent:  10/25/19; Mask Ventilation: Easy; Intubation: Easy: 7 at 21 cm: Mac 3; Attempts: 1;  Grade View of Cords: 1                 Paris Lane MD

## 2020-01-17 ENCOUNTER — ANESTHESIA (OUTPATIENT)
Dept: SURGERY | Facility: CLINIC | Age: 55
DRG: 470 | End: 2020-01-17
Payer: COMMERCIAL

## 2020-01-17 ENCOUNTER — HOSPITAL ENCOUNTER (INPATIENT)
Facility: CLINIC | Age: 55
LOS: 1 days | Discharge: HOME | DRG: 470 | End: 2020-01-18
Attending: ORTHOPAEDIC SURGERY | Admitting: ORTHOPAEDIC SURGERY
Payer: COMMERCIAL

## 2020-01-17 ENCOUNTER — APPOINTMENT (OUTPATIENT)
Dept: PHYSICAL THERAPY | Facility: CLINIC | Age: 55
DRG: 470 | End: 2020-01-17
Attending: ORTHOPAEDIC SURGERY
Payer: COMMERCIAL

## 2020-01-17 ENCOUNTER — APPOINTMENT (OUTPATIENT)
Dept: GENERAL RADIOLOGY | Facility: CLINIC | Age: 55
DRG: 470 | End: 2020-01-17
Attending: ORTHOPAEDIC SURGERY
Payer: COMMERCIAL

## 2020-01-17 DIAGNOSIS — Z98.890 STATUS POST HIP SURGERY: Primary | ICD-10-CM

## 2020-01-17 DIAGNOSIS — M16.11 OSTEOARTHRITIS OF RIGHT HIP: ICD-10-CM

## 2020-01-17 LAB
CREAT SERPL-MCNC: 0.82 MG/DL (ref 0.52–1.04)
GFR SERPL CREATININE-BSD FRML MDRD: 80 ML/MIN/{1.73_M2}
GLUCOSE SERPL-MCNC: 112 MG/DL (ref 70–99)
POTASSIUM SERPL-SCNC: 3.9 MMOL/L (ref 3.4–5.3)

## 2020-01-17 PROCEDURE — C1713 ANCHOR/SCREW BN/BN,TIS/BN: HCPCS | Performed by: ORTHOPAEDIC SURGERY

## 2020-01-17 PROCEDURE — 25000125 ZZHC RX 250: Performed by: ORTHOPAEDIC SURGERY

## 2020-01-17 PROCEDURE — 25800030 ZZH RX IP 258 OP 636: Performed by: STUDENT IN AN ORGANIZED HEALTH CARE EDUCATION/TRAINING PROGRAM

## 2020-01-17 PROCEDURE — 25000125 ZZHC RX 250: Performed by: NURSE ANESTHETIST, CERTIFIED REGISTERED

## 2020-01-17 PROCEDURE — 25000128 H RX IP 250 OP 636: Performed by: STUDENT IN AN ORGANIZED HEALTH CARE EDUCATION/TRAINING PROGRAM

## 2020-01-17 PROCEDURE — C1776 JOINT DEVICE (IMPLANTABLE): HCPCS | Performed by: ORTHOPAEDIC SURGERY

## 2020-01-17 PROCEDURE — 25000132 ZZH RX MED GY IP 250 OP 250 PS 637: Performed by: ANESTHESIOLOGY

## 2020-01-17 PROCEDURE — 25800030 ZZH RX IP 258 OP 636: Performed by: ORTHOPAEDIC SURGERY

## 2020-01-17 PROCEDURE — 27210794 ZZH OR GENERAL SUPPLY STERILE: Performed by: ORTHOPAEDIC SURGERY

## 2020-01-17 PROCEDURE — 25000128 H RX IP 250 OP 636: Performed by: ORTHOPAEDIC SURGERY

## 2020-01-17 PROCEDURE — 82565 ASSAY OF CREATININE: CPT | Performed by: ANESTHESIOLOGY

## 2020-01-17 PROCEDURE — 36415 COLL VENOUS BLD VENIPUNCTURE: CPT | Performed by: ANESTHESIOLOGY

## 2020-01-17 PROCEDURE — 25000128 H RX IP 250 OP 636: Performed by: ANESTHESIOLOGY

## 2020-01-17 PROCEDURE — 84132 ASSAY OF SERUM POTASSIUM: CPT | Performed by: ANESTHESIOLOGY

## 2020-01-17 PROCEDURE — 12000001 ZZH R&B MED SURG/OB UMMC

## 2020-01-17 PROCEDURE — 36000062 ZZH SURGERY LEVEL 4 1ST 30 MIN - UMMC: Performed by: ORTHOPAEDIC SURGERY

## 2020-01-17 PROCEDURE — 82947 ASSAY GLUCOSE BLOOD QUANT: CPT | Performed by: ANESTHESIOLOGY

## 2020-01-17 PROCEDURE — 97530 THERAPEUTIC ACTIVITIES: CPT | Mod: GP | Performed by: PHYSICAL THERAPIST

## 2020-01-17 PROCEDURE — 97161 PT EVAL LOW COMPLEX 20 MIN: CPT | Mod: GP | Performed by: PHYSICAL THERAPIST

## 2020-01-17 PROCEDURE — 97116 GAIT TRAINING THERAPY: CPT | Mod: GP | Performed by: PHYSICAL THERAPIST

## 2020-01-17 PROCEDURE — 99222 1ST HOSP IP/OBS MODERATE 55: CPT | Performed by: INTERNAL MEDICINE

## 2020-01-17 PROCEDURE — 36000064 ZZH SURGERY LEVEL 4 EA 15 ADDTL MIN - UMMC: Performed by: ORTHOPAEDIC SURGERY

## 2020-01-17 PROCEDURE — 37000008 ZZH ANESTHESIA TECHNICAL FEE, 1ST 30 MIN: Performed by: ORTHOPAEDIC SURGERY

## 2020-01-17 PROCEDURE — 25800030 ZZH RX IP 258 OP 636: Performed by: NURSE ANESTHETIST, CERTIFIED REGISTERED

## 2020-01-17 PROCEDURE — 37000009 ZZH ANESTHESIA TECHNICAL FEE, EACH ADDTL 15 MIN: Performed by: ORTHOPAEDIC SURGERY

## 2020-01-17 PROCEDURE — 25000128 H RX IP 250 OP 636: Performed by: NURSE ANESTHETIST, CERTIFIED REGISTERED

## 2020-01-17 PROCEDURE — 0SR904A REPLACEMENT OF RIGHT HIP JOINT WITH CERAMIC ON POLYETHYLENE SYNTHETIC SUBSTITUTE, UNCEMENTED, OPEN APPROACH: ICD-10-PCS | Performed by: ORTHOPAEDIC SURGERY

## 2020-01-17 PROCEDURE — 71000014 ZZH RECOVERY PHASE 1 LEVEL 2 FIRST HR: Performed by: ORTHOPAEDIC SURGERY

## 2020-01-17 PROCEDURE — 25000128 H RX IP 250 OP 636: Performed by: INTERNAL MEDICINE

## 2020-01-17 PROCEDURE — 25000566 ZZH SEVOFLURANE, EA 15 MIN: Performed by: ORTHOPAEDIC SURGERY

## 2020-01-17 PROCEDURE — 97110 THERAPEUTIC EXERCISES: CPT | Mod: GP | Performed by: PHYSICAL THERAPIST

## 2020-01-17 PROCEDURE — 25000132 ZZH RX MED GY IP 250 OP 250 PS 637: Performed by: STUDENT IN AN ORGANIZED HEALTH CARE EDUCATION/TRAINING PROGRAM

## 2020-01-17 PROCEDURE — 40000986 XR PELVIS AD HIP PORTABLE RIGHT 1 VIEW

## 2020-01-17 PROCEDURE — 40000170 ZZH STATISTIC PRE-PROCEDURE ASSESSMENT II: Performed by: ORTHOPAEDIC SURGERY

## 2020-01-17 DEVICE — IMP SCR BIOM 6.5X30MM LOW PROF TI 103533: Type: IMPLANTABLE DEVICE | Site: HIP | Status: FUNCTIONAL

## 2020-01-17 DEVICE — IMPLANTABLE DEVICE: Type: IMPLANTABLE DEVICE | Site: HIP | Status: FUNCTIONAL

## 2020-01-17 RX ORDER — FENTANYL CITRATE 50 UG/ML
INJECTION, SOLUTION INTRAMUSCULAR; INTRAVENOUS PRN
Status: DISCONTINUED | OUTPATIENT
Start: 2020-01-17 | End: 2020-01-17

## 2020-01-17 RX ORDER — KETOROLAC TROMETHAMINE 15 MG/ML
15 INJECTION, SOLUTION INTRAMUSCULAR; INTRAVENOUS EVERY 6 HOURS
Status: COMPLETED | OUTPATIENT
Start: 2020-01-17 | End: 2020-01-18

## 2020-01-17 RX ORDER — PROCHLORPERAZINE MALEATE 5 MG
10 TABLET ORAL EVERY 6 HOURS PRN
Status: DISCONTINUED | OUTPATIENT
Start: 2020-01-17 | End: 2020-01-18 | Stop reason: HOSPADM

## 2020-01-17 RX ORDER — POLYETHYLENE GLYCOL 3350 17 G/17G
17 POWDER, FOR SOLUTION ORAL DAILY
Status: DISCONTINUED | OUTPATIENT
Start: 2020-01-17 | End: 2020-01-18 | Stop reason: HOSPADM

## 2020-01-17 RX ORDER — ALBUTEROL SULFATE 0.83 MG/ML
2.5 SOLUTION RESPIRATORY (INHALATION) EVERY 4 HOURS PRN
Status: DISCONTINUED | OUTPATIENT
Start: 2020-01-17 | End: 2020-01-17 | Stop reason: HOSPADM

## 2020-01-17 RX ORDER — SODIUM CHLORIDE, SODIUM LACTATE, POTASSIUM CHLORIDE, CALCIUM CHLORIDE 600; 310; 30; 20 MG/100ML; MG/100ML; MG/100ML; MG/100ML
INJECTION, SOLUTION INTRAVENOUS CONTINUOUS
Status: DISCONTINUED | OUTPATIENT
Start: 2020-01-17 | End: 2020-01-17 | Stop reason: HOSPADM

## 2020-01-17 RX ORDER — GABAPENTIN 100 MG/1
300 CAPSULE ORAL
Status: COMPLETED | OUTPATIENT
Start: 2020-01-17 | End: 2020-01-17

## 2020-01-17 RX ORDER — FENTANYL CITRATE 50 UG/ML
25-50 INJECTION, SOLUTION INTRAMUSCULAR; INTRAVENOUS
Status: DISCONTINUED | OUTPATIENT
Start: 2020-01-17 | End: 2020-01-17 | Stop reason: HOSPADM

## 2020-01-17 RX ORDER — ONDANSETRON 4 MG/1
4 TABLET, ORALLY DISINTEGRATING ORAL EVERY 30 MIN PRN
Status: DISCONTINUED | OUTPATIENT
Start: 2020-01-17 | End: 2020-01-17 | Stop reason: HOSPADM

## 2020-01-17 RX ORDER — NALOXONE HYDROCHLORIDE 0.4 MG/ML
.1-.4 INJECTION, SOLUTION INTRAMUSCULAR; INTRAVENOUS; SUBCUTANEOUS
Status: DISCONTINUED | OUTPATIENT
Start: 2020-01-17 | End: 2020-01-17 | Stop reason: HOSPADM

## 2020-01-17 RX ORDER — CEFAZOLIN SODIUM 2 G/100ML
2 INJECTION, SOLUTION INTRAVENOUS
Status: COMPLETED | OUTPATIENT
Start: 2020-01-17 | End: 2020-01-17

## 2020-01-17 RX ORDER — DIMENHYDRINATE 50 MG/ML
25 INJECTION, SOLUTION INTRAMUSCULAR; INTRAVENOUS
Status: DISCONTINUED | OUTPATIENT
Start: 2020-01-17 | End: 2020-01-17 | Stop reason: HOSPADM

## 2020-01-17 RX ORDER — KETOROLAC TROMETHAMINE 30 MG/ML
30 INJECTION, SOLUTION INTRAMUSCULAR; INTRAVENOUS EVERY 6 HOURS
Status: DISCONTINUED | OUTPATIENT
Start: 2020-01-17 | End: 2020-01-17

## 2020-01-17 RX ORDER — CEFAZOLIN SODIUM 2 G/100ML
2 INJECTION, SOLUTION INTRAVENOUS
Status: DISCONTINUED | OUTPATIENT
Start: 2020-01-17 | End: 2020-01-17 | Stop reason: HOSPADM

## 2020-01-17 RX ORDER — SODIUM CHLORIDE, SODIUM LACTATE, POTASSIUM CHLORIDE, CALCIUM CHLORIDE 600; 310; 30; 20 MG/100ML; MG/100ML; MG/100ML; MG/100ML
INJECTION, SOLUTION INTRAVENOUS CONTINUOUS
Status: DISCONTINUED | OUTPATIENT
Start: 2020-01-17 | End: 2020-01-18 | Stop reason: HOSPADM

## 2020-01-17 RX ORDER — ACETAMINOPHEN 325 MG/1
650 TABLET ORAL EVERY 4 HOURS PRN
Status: DISCONTINUED | OUTPATIENT
Start: 2020-01-20 | End: 2020-01-18 | Stop reason: HOSPADM

## 2020-01-17 RX ORDER — LIDOCAINE 40 MG/G
CREAM TOPICAL
Status: DISCONTINUED | OUTPATIENT
Start: 2020-01-17 | End: 2020-01-18 | Stop reason: HOSPADM

## 2020-01-17 RX ORDER — HYDRALAZINE HYDROCHLORIDE 20 MG/ML
2.5-5 INJECTION INTRAMUSCULAR; INTRAVENOUS EVERY 10 MIN PRN
Status: DISCONTINUED | OUTPATIENT
Start: 2020-01-17 | End: 2020-01-17 | Stop reason: HOSPADM

## 2020-01-17 RX ORDER — SODIUM CHLORIDE, SODIUM LACTATE, POTASSIUM CHLORIDE, CALCIUM CHLORIDE 600; 310; 30; 20 MG/100ML; MG/100ML; MG/100ML; MG/100ML
INJECTION, SOLUTION INTRAVENOUS CONTINUOUS PRN
Status: DISCONTINUED | OUTPATIENT
Start: 2020-01-17 | End: 2020-01-17

## 2020-01-17 RX ORDER — ONDANSETRON 2 MG/ML
4 INJECTION INTRAMUSCULAR; INTRAVENOUS EVERY 30 MIN PRN
Status: DISCONTINUED | OUTPATIENT
Start: 2020-01-17 | End: 2020-01-17 | Stop reason: HOSPADM

## 2020-01-17 RX ORDER — MAGNESIUM HYDROXIDE 1200 MG/15ML
LIQUID ORAL PRN
Status: DISCONTINUED | OUTPATIENT
Start: 2020-01-17 | End: 2020-01-17 | Stop reason: HOSPADM

## 2020-01-17 RX ORDER — LIDOCAINE HYDROCHLORIDE 20 MG/ML
INJECTION, SOLUTION INFILTRATION; PERINEURAL PRN
Status: DISCONTINUED | OUTPATIENT
Start: 2020-01-17 | End: 2020-01-17

## 2020-01-17 RX ORDER — AMOXICILLIN 250 MG
2 CAPSULE ORAL 2 TIMES DAILY
Status: DISCONTINUED | OUTPATIENT
Start: 2020-01-17 | End: 2020-01-18 | Stop reason: HOSPADM

## 2020-01-17 RX ORDER — ACETAMINOPHEN 325 MG/1
975 TABLET ORAL
Status: COMPLETED | OUTPATIENT
Start: 2020-01-17 | End: 2020-01-17

## 2020-01-17 RX ORDER — BISACODYL 10 MG
10 SUPPOSITORY, RECTAL RECTAL DAILY PRN
Status: DISCONTINUED | OUTPATIENT
Start: 2020-01-17 | End: 2020-01-18 | Stop reason: HOSPADM

## 2020-01-17 RX ORDER — ONDANSETRON 4 MG/1
4 TABLET, ORALLY DISINTEGRATING ORAL EVERY 6 HOURS PRN
Status: DISCONTINUED | OUTPATIENT
Start: 2020-01-17 | End: 2020-01-18 | Stop reason: HOSPADM

## 2020-01-17 RX ORDER — HYDROMORPHONE HYDROCHLORIDE 1 MG/ML
.3-.5 INJECTION, SOLUTION INTRAMUSCULAR; INTRAVENOUS; SUBCUTANEOUS EVERY 10 MIN PRN
Status: DISCONTINUED | OUTPATIENT
Start: 2020-01-17 | End: 2020-01-17 | Stop reason: HOSPADM

## 2020-01-17 RX ORDER — ONDANSETRON 2 MG/ML
4 INJECTION INTRAMUSCULAR; INTRAVENOUS EVERY 6 HOURS PRN
Status: DISCONTINUED | OUTPATIENT
Start: 2020-01-17 | End: 2020-01-18 | Stop reason: HOSPADM

## 2020-01-17 RX ORDER — CEFAZOLIN SODIUM 2 G/100ML
2 INJECTION, SOLUTION INTRAVENOUS EVERY 8 HOURS
Status: COMPLETED | OUTPATIENT
Start: 2020-01-17 | End: 2020-01-18

## 2020-01-17 RX ORDER — OXYCODONE HYDROCHLORIDE 5 MG/1
5-10 TABLET ORAL
Status: DISCONTINUED | OUTPATIENT
Start: 2020-01-17 | End: 2020-01-18 | Stop reason: HOSPADM

## 2020-01-17 RX ORDER — ONDANSETRON 2 MG/ML
INJECTION INTRAMUSCULAR; INTRAVENOUS PRN
Status: DISCONTINUED | OUTPATIENT
Start: 2020-01-17 | End: 2020-01-17

## 2020-01-17 RX ORDER — LIDOCAINE 40 MG/G
CREAM TOPICAL
Status: DISCONTINUED | OUTPATIENT
Start: 2020-01-17 | End: 2020-01-17 | Stop reason: HOSPADM

## 2020-01-17 RX ORDER — PROPOFOL 10 MG/ML
INJECTION, EMULSION INTRAVENOUS PRN
Status: DISCONTINUED | OUTPATIENT
Start: 2020-01-17 | End: 2020-01-17

## 2020-01-17 RX ORDER — ACETAMINOPHEN 325 MG/1
975 TABLET ORAL EVERY 8 HOURS
Status: DISCONTINUED | OUTPATIENT
Start: 2020-01-17 | End: 2020-01-18 | Stop reason: HOSPADM

## 2020-01-17 RX ORDER — HYDROMORPHONE HYDROCHLORIDE 1 MG/ML
.2-.4 INJECTION, SOLUTION INTRAMUSCULAR; INTRAVENOUS; SUBCUTANEOUS
Status: DISCONTINUED | OUTPATIENT
Start: 2020-01-17 | End: 2020-01-18 | Stop reason: HOSPADM

## 2020-01-17 RX ORDER — METOPROLOL TARTRATE 1 MG/ML
1-2 INJECTION, SOLUTION INTRAVENOUS EVERY 5 MIN PRN
Status: DISCONTINUED | OUTPATIENT
Start: 2020-01-17 | End: 2020-01-17 | Stop reason: HOSPADM

## 2020-01-17 RX ORDER — NALOXONE HYDROCHLORIDE 0.4 MG/ML
.1-.4 INJECTION, SOLUTION INTRAMUSCULAR; INTRAVENOUS; SUBCUTANEOUS
Status: DISCONTINUED | OUTPATIENT
Start: 2020-01-17 | End: 2020-01-18 | Stop reason: HOSPADM

## 2020-01-17 RX ORDER — MEPERIDINE HYDROCHLORIDE 25 MG/ML
12.5 INJECTION INTRAMUSCULAR; INTRAVENOUS; SUBCUTANEOUS
Status: DISCONTINUED | OUTPATIENT
Start: 2020-01-17 | End: 2020-01-17 | Stop reason: HOSPADM

## 2020-01-17 RX ADMIN — PROPOFOL 165 MG: 10 INJECTION, EMULSION INTRAVENOUS at 07:47

## 2020-01-17 RX ADMIN — KETOROLAC TROMETHAMINE 15 MG: 15 INJECTION, SOLUTION INTRAMUSCULAR; INTRAVENOUS at 20:05

## 2020-01-17 RX ADMIN — HYDROMORPHONE HYDROCHLORIDE 0.5 MG: 1 INJECTION, SOLUTION INTRAMUSCULAR; INTRAVENOUS; SUBCUTANEOUS at 11:10

## 2020-01-17 RX ADMIN — OXYCODONE HYDROCHLORIDE 5 MG: 5 TABLET ORAL at 22:56

## 2020-01-17 RX ADMIN — SODIUM CHLORIDE, POTASSIUM CHLORIDE, SODIUM LACTATE AND CALCIUM CHLORIDE: 600; 310; 30; 20 INJECTION, SOLUTION INTRAVENOUS at 09:29

## 2020-01-17 RX ADMIN — GABAPENTIN 300 MG: 300 CAPSULE ORAL at 06:28

## 2020-01-17 RX ADMIN — HYDROMORPHONE HYDROCHLORIDE 0.2 MG: 1 INJECTION, SOLUTION INTRAMUSCULAR; INTRAVENOUS; SUBCUTANEOUS at 11:19

## 2020-01-17 RX ADMIN — TRANEXAMIC ACID 1 G: 1 INJECTION, SOLUTION INTRAVENOUS at 08:11

## 2020-01-17 RX ADMIN — HYDROMORPHONE HYDROCHLORIDE 0.5 MG: 1 INJECTION, SOLUTION INTRAMUSCULAR; INTRAVENOUS; SUBCUTANEOUS at 09:11

## 2020-01-17 RX ADMIN — FENTANYL CITRATE 50 MCG: 50 INJECTION INTRAMUSCULAR; INTRAVENOUS at 10:40

## 2020-01-17 RX ADMIN — SODIUM CHLORIDE, POTASSIUM CHLORIDE, SODIUM LACTATE AND CALCIUM CHLORIDE: 600; 310; 30; 20 INJECTION, SOLUTION INTRAVENOUS at 07:39

## 2020-01-17 RX ADMIN — LIDOCAINE HYDROCHLORIDE 100 MG: 20 INJECTION, SOLUTION INFILTRATION; PERINEURAL at 07:45

## 2020-01-17 RX ADMIN — CEFAZOLIN SODIUM 2 G: 2 INJECTION, SOLUTION INTRAVENOUS at 07:55

## 2020-01-17 RX ADMIN — ROCURONIUM BROMIDE 50 MG: 10 INJECTION INTRAVENOUS at 07:48

## 2020-01-17 RX ADMIN — HYDROMORPHONE HYDROCHLORIDE 0.3 MG: 1 INJECTION, SOLUTION INTRAMUSCULAR; INTRAVENOUS; SUBCUTANEOUS at 10:58

## 2020-01-17 RX ADMIN — OXYCODONE HYDROCHLORIDE 5 MG: 5 TABLET ORAL at 15:52

## 2020-01-17 RX ADMIN — FENTANYL CITRATE 50 MCG: 50 INJECTION INTRAMUSCULAR; INTRAVENOUS at 10:30

## 2020-01-17 RX ADMIN — MIDAZOLAM 2 MG: 1 INJECTION INTRAMUSCULAR; INTRAVENOUS at 07:43

## 2020-01-17 RX ADMIN — FENTANYL CITRATE 50 MCG: 50 INJECTION, SOLUTION INTRAMUSCULAR; INTRAVENOUS at 08:44

## 2020-01-17 RX ADMIN — ONDANSETRON 4 MG: 2 INJECTION INTRAMUSCULAR; INTRAVENOUS at 09:36

## 2020-01-17 RX ADMIN — CEFAZOLIN SODIUM 2 G: 2 INJECTION, SOLUTION INTRAVENOUS at 18:16

## 2020-01-17 RX ADMIN — FENTANYL CITRATE 25 MCG: 50 INJECTION INTRAMUSCULAR; INTRAVENOUS at 10:57

## 2020-01-17 RX ADMIN — ACETAMINOPHEN 975 MG: 325 TABLET, FILM COATED ORAL at 06:28

## 2020-01-17 RX ADMIN — SODIUM CHLORIDE, POTASSIUM CHLORIDE, SODIUM LACTATE AND CALCIUM CHLORIDE: 600; 310; 30; 20 INJECTION, SOLUTION INTRAVENOUS at 18:18

## 2020-01-17 RX ADMIN — FENTANYL CITRATE 100 MCG: 50 INJECTION, SOLUTION INTRAMUSCULAR; INTRAVENOUS at 07:46

## 2020-01-17 RX ADMIN — OXYCODONE HYDROCHLORIDE 5 MG: 5 TABLET ORAL at 19:05

## 2020-01-17 RX ADMIN — SENNOSIDES AND DOCUSATE SODIUM 2 TABLET: 8.6; 5 TABLET ORAL at 20:04

## 2020-01-17 RX ADMIN — FENTANYL CITRATE 50 MCG: 50 INJECTION INTRAMUSCULAR; INTRAVENOUS at 10:45

## 2020-01-17 RX ADMIN — FENTANYL CITRATE 50 MCG: 50 INJECTION, SOLUTION INTRAMUSCULAR; INTRAVENOUS at 08:27

## 2020-01-17 RX ADMIN — FENTANYL CITRATE 25 MCG: 50 INJECTION INTRAMUSCULAR; INTRAVENOUS at 10:22

## 2020-01-17 RX ADMIN — ACETAMINOPHEN 975 MG: 325 TABLET, FILM COATED ORAL at 14:58

## 2020-01-17 RX ADMIN — HYDROMORPHONE HYDROCHLORIDE 0.3 MG: 1 INJECTION, SOLUTION INTRAMUSCULAR; INTRAVENOUS; SUBCUTANEOUS at 12:00

## 2020-01-17 RX ADMIN — ACETAMINOPHEN 975 MG: 325 TABLET, FILM COATED ORAL at 22:56

## 2020-01-17 RX ADMIN — POLYETHYLENE GLYCOL 3350 17 G: 17 POWDER, FOR SOLUTION ORAL at 14:58

## 2020-01-17 ASSESSMENT — ACTIVITIES OF DAILY LIVING (ADL)
TOILETING: 1-->ASSISTIVE EQUIPMENT
DRESS: 0-->INDEPENDENT
SWALLOWING: 0-->SWALLOWS FOODS/LIQUIDS WITHOUT DIFFICULTY
WHICH_OF_THE_ABOVE_FUNCTIONAL_RISKS_HAD_A_RECENT_ONSET_OR_CHANGE?: AMBULATION
AMBULATION: 1-->ASSISTIVE EQUIPMENT
RETIRED_EATING: 0-->INDEPENDENT
FALL_HISTORY_WITHIN_LAST_SIX_MONTHS: NO
COGNITION: 0 - NO COGNITION ISSUES REPORTED
BATHING: 0-->INDEPENDENT
TRANSFERRING: 0-->INDEPENDENT
RETIRED_COMMUNICATION: 0-->UNDERSTANDS/COMMUNICATES WITHOUT DIFFICULTY

## 2020-01-17 ASSESSMENT — MIFFLIN-ST. JEOR: SCORE: 1708.63

## 2020-01-17 NOTE — OR NURSING
PACU to Inpatient Nursing Handoff    Patient Licha Birch is a 54 year old adult who speaks English.   Procedure Procedure(s):  Right total hip arthroplasty   Surgeon(s) Primary: Rashid Schuster MD  Fellow - Assisting: Michael Clark MD     No Known Allergies    Isolation  No active isolations     Past Medical History   has a past medical history of Osteoarthritis of left hip.    Anesthesia General   Dermatome Level     Preop Meds acetaminophen (Tylenol) - time given: 0730  gabapentin (Neurontin) - time given: 0730   Nerve block Not applicable   Intraop Meds fentanyl (Sublimaze): 200 mcg total  hydromorphone (Dilaudid): 1.3 mg total  ondansetron (Zofran): last given at 1000   Local Meds Yes - Local Cocktail (morphine, ropivacaine, epinephrine, Toradol)   Antibiotics cefazolin (Ancef) - last given at 0755     Pain Patient Currently in Pain: yes  Comfort: tolerable with discomfort  Pain Control: partially effective   PACU meds  fentanyl (Sublimaze): 200 mcg (total dose) last given at 1057   hydromorphone (Dilaudid): 1 mg (total dose) last given at 1119    PCA / epidural No   Capnography Respiratory Monitoring (EtCO2): 41 mmHg  Integrated Pulmonary Index (IPI): 8-9   Telemetry ECG Rhythm: Normal sinus rhythm   Inpatient Telemetry Monitor Ordered? No        Labs Glucose Lab Results   Component Value Date     01/17/2020       Hgb Lab Results   Component Value Date    HGB 13.6 01/14/2020       INR No results found for: INR   PACU Imaging Completed     Wound/Incision Incision/Surgical Site 10/25/19 Left Hip (Active)   Number of days: 84       Incision/Surgical Site 01/17/20 Right Hip (Active)   Incision Assessment UTV 1/17/2020 11:15 AM   Closure AFTAB 1/17/2020 11:15 AM   Incision Drainage Amount None 1/17/2020 11:15 AM   Dressing Intervention Clean, dry, intact 1/17/2020 11:15 AM   Number of days: 0      CMS        Equipment ice pack and abductor pillow   Other LDA       IV Access Peripheral IV 01/17/20  Right;Posterior Hand (Active)   Site Assessment [facility] 1/17/2020 11:15 AM   Line Status Infusing;Checked every 1 hour 1/17/2020 11:15 AM   Phlebitis Scale 0-->no symptoms 1/17/2020 11:15 AM   Infiltration Scale 0 1/17/2020 11:15 AM   Extravasation? No 1/17/2020 11:15 AM   Dressing Intervention New dressing  1/17/2020  7:00 AM   Number of days: 0      Blood Products Not applicable  mL   Intake/Output Date 01/17/20 0700 - 01/18/20 0659   Shift 0100-4639 9463-4160 6387-4778 24 Hour Total   INTAKE   I.V. 1250   1250   Shift Total(mL/kg) 1250(11.56)   1250(11.56)   OUTPUT   Blood 100   100   Shift Total(mL/kg) 100(0.93)   100(0.93)   Weight (kg) 108.1 108.1 108.1 108.1      Drains / Reynolds Closed/Suction Drain 1 Right Hip Bulb 15 [place] (Active)   Site Description [facility] 1/17/2020 11:15 AM   Dressing Status Normal: Clean, Dry & Intact 1/17/2020 11:15 AM   Drainage Appearance Serosanguenous 1/17/2020 11:15 AM   Status To bulb suction 1/17/2020 11:15 AM   Number of days: 0      Time of void PreOp Void Prior to Procedure: 0600 (01/17/20 0625)    PostOp      Diapered? No   Bladder Scan     PO    ice chips     Vitals    B/P: 116/84  T: 98.2  F (36.8  C)    Temp src: Oral  P:  Pulse: 94 (01/17/20 1115)    Heart Rate: 93 (01/17/20 1115)     R: 8  O2:  SpO2: 99 %    O2 Device: Nasal cannula (01/17/20 1115)    Oxygen Delivery: 2.5 LPM (01/17/20 1115)         Family/support present significant other   Patient belongings     Patient transported on bed   DC meds/scripts (obs/outpt) Not applicable   Inpatient Pain Meds Released? Yes       Special needs/considerations None   Tasks needing completion None       Marcela Hightower RN  ASCOM [id number]

## 2020-01-17 NOTE — ANESTHESIA CARE TRANSFER NOTE
Patient: Licha Birch    Procedure(s):  Right total hip arthroplasty    Diagnosis: Osteoarthritis of right hip [M16.11]  Diagnosis Additional Information: No value filed.    Anesthesia Type:   General     Note:  Airway :Face Mask  Patient transferred to:PACU  Handoff Report: Identifed the Patient, Identified the Reponsible Provider, Reviewed the pertinent medical history, Discussed the surgical course, Reviewed Intra-OP anesthesia mangement and issues during anesthesia, Set expectations for post-procedure period and Allowed opportunity for questions and acknowledgement of understanding      Vitals: (Last set prior to Anesthesia Care Transfer)    CRNA VITALS  1/17/2020 0942 - 1/17/2020 1021      1/17/2020             Pulse:  104    SpO2:  100 %    Resp Rate (observed):  14                Electronically Signed By: GALO Casarez CRNA  January 17, 2020  10:21 AM

## 2020-01-17 NOTE — ANESTHESIA POSTPROCEDURE EVALUATION
Anesthesia POST Procedure Evaluation    Patient: Licha Birch   MRN:     [id number] Gender:   adult   Age:    54 year old :      1965        Preoperative Diagnosis: Osteoarthritis of right hip [M16.11]   Procedure(s):  Right total hip arthroplasty   Postop Comments: No value filed.       Anesthesia Type:  Not documented  General    Reportable Event: NO     PAIN: Uncomplicated   Sign Out status: Comfortable, Well controlled pain     PONV: No PONV   Sign Out status:  No Nausea or Vomiting     Neuro/Psych: Uneventful perioperative course   Sign Out Status: Preoperative baseline; Age appropriate mentation     Airway/Resp.: Uneventful perioperative course   Sign Out Status: Non labored breathing, age appropriate RR; Resp. Status within EXPECTED Parameters     CV: Uneventful perioperative course   Sign Out status: Appropriate BP and perfusion indices; Appropriate HR/Rhythm     Disposition:   Sign Out in:  PACU  Disposition:  Floor  Recovery Course: Uneventful  Follow-Up: Not required           Last Anesthesia Record Vitals:  CRNA VITALS  2020 0942 - 2020 1042      2020             Pulse:  104    SpO2:  100 %    Resp Rate (observed):  14          Last PACU Vitals:  Vitals Value Taken Time   /92 2020 10:30 AM   Temp     Pulse 92 2020 10:30 AM   Resp 18 2020 10:44 AM   SpO2 91 % 2020 10:44 AM   Temp src     NIBP     Pulse     SpO2     Resp     Temp     Ht Rate     Temp 2     Vitals shown include unvalidated device data.      Electronically Signed By: Paris Lane MD, 2020, 10:45 AM

## 2020-01-17 NOTE — PLAN OF CARE
Discharge Planner PT   Patient plan for discharge: home with family, outpatient PT  Current status: Pt completes all bed mobility with SBA and use of bed rail. Completed all transfers SBA and use of FWW. Ambulated 50' with use of FWW and SBA within hip precautions including no pivot turn.   Barriers to return to prior living situation: stairs, pain, fatigue  Recommendations for discharge: home with family, outpatient PT  Rationale for recommendations: improve independence, allow pt to achieve stated goals, improve functional mobility       Entered by: Clinton Andrade 01/17/2020 5:22 PM

## 2020-01-17 NOTE — PROGRESS NOTES
VS: Stable   O2: Patient arrived to unit and is on Capnography. Patient needing to be on 2 liters per nasal canula   Output: Still awaiting for first void.   Last BM:    Activity: Patient able to go back and forth in bed. Still awaiting for patient to get up for first time to void. Placed Commode in room along with walker anticipating for transfer.    Skin: Patient has a new onset of rash on left lower leg. This was clearly marked with purple marking pen.    Pain: Patient denies needing anything for pain at this time.    CMS: Intact.    Dressing: Right total hip dressing is CDI. SUDHEER bulb is draining small amount of bloody drainage.    Diet: Regular   LDA: IV is infusing   Equipment: Walker/IS/commode/PCDS   Plan: Patient is hoping to discharge to home with son.    Additional Info: Patient just had a surgery on her other hip in October 2019.

## 2020-01-17 NOTE — OP NOTE
PREOPERATIVE DIAGNOSIS: osteoarthritisof  Right hip.   POSTOPERATIVE DIAGNOSIS: Same as pre-op.   PROCEDURE: Right total hip arthroplasty.   COMPONENTS USED:   Biomet Ranawat-Janet acetabular shell, 50  mm diameter.   Liner size 32 mm inner diameter. Elevated lip: Yes  Taperloc femoral stem size 12 mm, normal offset, STD 0 mm neck length, 32-mm diameter  head.   INDICATIONS: osteoarthritis  DESCRIPTION OF PROCEDURE: The patient was placed on the operating table in the lateral decubitus position after induction of general anesthesia. With the operated hip turned up, standard prep and drape was performed. A minimal incision posterior lateral approach to the hip joint was then performed making a curvilinear incision over the greater trochanter and taking this down through the subcutaneous tissue. The gluteus lucy was then split in line with its fibers. After internal rotation of the femur, the piriformis and obturator internus muscles were detached and the soft tissue capsule was divided from the posterior aspect of the femoral neck. The tendons were tagged with a suture. 2-0 silk sutures were used to control the circumflex vessels. At this point, the capsule was incised superiorly and inferiorly as a rhomboid shaped trap-door. The hip joint was then dislocated. A femoral neck osteotomy was performed at the mid portion of the femoral neck. Head was removed. The femur was then displaced anteriorly and the acetabulum was exposed after additional capsular release as needed.   Large osteophytes were excised as needed. The acetabulum was now reamed by medializing to within 2 mm of the medial wall. Sequentially enlarging reaming was performed to a 1 mm undersize relative to the cup size implanted. The above mentioned size cup was selected as the optimal implant. It was impacted into a 45-degree abducted and 20-degree anteverted position. Fixation screws, 6.5 mm diameter were placed as needed for stability.  A trial  liner component was placed.   Attention was now directed to the femur where it was exposed. Sequential reaming and broaching were performed to allow placement of the above mentioned diameter component. After trialing and using the broach, the hip was found to be secure and stable in full extension and external rotation of > 45 degrees as well as flexion of 90 degrees combined with internal rotation > 45 degrees.  Leg lengths were judged to be within 5 mm of symmetry.   At this time, the acetabulum was re-exposed and a liner insert locked into position with the apex of the elevated lip placed at 9 o'clock position as needed. After securing the stability of the implant, attention was directed to the femur where the above-mentioned size was inserted in antegrade fashion. There is no evidence of any fracture of the femur during insertion. Again, trial reduction was performed and the above mentioned size neck length was selected. The range of motion was confirmed again and the hip was stable. The real head was impacted on the [place]rell[place] taper junction after it had been cleansed and dried.  A thorough irrigation of the wound was now performed. A drain was placed in the deep portion of the wound. Wound closure was accomplished by reapproximating the posterior capsular soft tissues as well as obturator internus and the piriformis muscles. The remainder of the wound was closed in layers with absorbable sutures using standard technique. Sterile dressing was applied.   Postoperative plan:  Weightbearing as tolerated on the operated hip. Standard posterior hip exposure dislocation precautions.Aspirin anticoagulation x 4 week's duration.     Michael Clark MD  [facility] Arthroplasty Fellow  ADELIA GAMING M.D.    Attending MD (Dr. Adelia Gaming) Attestation:  I was present during the key portions of the procedure and I was immediately available for the entire procedure between opening and closing.    Adelia Gaming MD  [facility]  Professor  Oncology and Adult Reconstructive Surgery  Dept Orthopaedic Surgery, [facility]

## 2020-01-17 NOTE — PROGRESS NOTES
01/17/20 1556   Quick Adds   Type of Visit Initial PT Evaluation  (Simultaneous filing. User may not have seen previous data.)       Present no  (Simultaneous filing. User may not have seen previous data.)   Language English  (Simultaneous filing. User may not have seen previous data.)   Living Environment   Lives With child(schuyler), adult;sibling(s)  (Simultaneous filing. User may not have seen previous data.)   Living Arrangements house  (Simultaneous filing. User may not have seen previous data.)   Home Accessibility stairs within home;stairs to enter home  (Simultaneous filing. User may not have seen previous data.)   Number of Stairs, Main Entrance 3  (Simultaneous filing. User may not have seen previous data.)   Stair Railings, Main Entrance railings on both sides of stairs;other (see comments)  (garage rail on left side Simultaneous filing. User may not have seen previous data.)   Number of Stairs, Within Home, Primary other (see comments)  (13 Simultaneous filing. User may not have seen previous data.)   Stair Railings, Within Home, Primary railing on left side (ascending)  (two rails for first 5 steps Simultaneous filing. User may not have seen previous data.)   Transportation Anticipated family or friend will provide;car, drives self  (Simultaneous filing. User may not have seen previous data.)   Self-Care   Usual Activity Tolerance good  (Simultaneous filing. User may not have seen previous data.)   Current Activity Tolerance moderate  (Simultaneous filing. User may not have seen previous data.)   Regular Exercise No  (Simultaneous filing. User may not have seen previous data.)   Equipment Currently Used at Home raised toilet  (walking stick out in the community Simultaneous filing. User may not have seen previous data.)   Functional Level Prior   Ambulation 1-->assistive equipment  (Simultaneous filing. User may not have seen previous data.)   Transferring  0-->independent  (Simultaneous filing. User may not have seen previous data.)   Toileting 1-->assistive equipment  (grab bars on each side Simultaneous filing. User may not have seen previous data.)   Bathing 1-->assistive equipment  (grab bar in shower Simultaneous filing. User may not have seen previous data.)   Communication 0-->understands/communicates without difficulty  (Simultaneous filing. User may not have seen previous data.)   Swallowing 0-->swallows foods/liquids without difficulty  (Simultaneous filing. User may not have seen previous data.)   Cognition 0 - no cognition issues reported  (Simultaneous filing. User may not have seen previous data.)   Fall history within last six months yes  (Simultaneous filing. User may not have seen previous data.)   Number of times patient has fallen within last six months 1  (uneven pavement Simultaneous filing. User may not have seen previous data.)   Which of the above functional risks had a recent onset or change? ambulation  (Simultaneous filing. User may not have seen previous data.)   General Information   Onset of Illness/Injury or Date of Surgery - Date 01/17/20  (Simultaneous filing. User may not have seen previous data.)   Referring Physician Rashid Schuster MD  (Simultaneous filing. User may not have seen previous data.)   Patient/Family Goals Statement back to hiking, yoga, be active with grandchildren  (Simultaneous filing. User may not have seen previous data.)   Pertinent History of Current Problem (include personal factors and/or comorbidities that impact the POC) s/p R ALE  (Simultaneous filing. User may not have seen previous data.)   Precautions/Limitations fall precautions;right hip precautions  (Simultaneous filing. User may not have seen previous data.)   Weight-Bearing Status - LUE full weight-bearing  (Simultaneous filing. User may not have seen previous data.)   Weight-Bearing Status - RUE full weight-bearing  (Simultaneous filing. User may not  have seen previous data.)   Weight-Bearing Status - LLE full weight-bearing  (Simultaneous filing. User may not have seen previous data.)   Weight-Bearing Status - RLE weight-bearing as tolerated  (Simultaneous filing. User may not have seen previous data.)   Heart Disease Risk Factors Overweight;Smoking  (Simultaneous filing. User may not have seen previous data.)   General Observations pt supine in bed at beginning of session.  (Simultaneous filing. User may not have seen previous data.)   General Info Comments capno, IV, PCD's, SUDHEER drain  (Simultaneous filing. User may not have seen previous data.)   Cognitive Status Examination   Orientation orientation to person, place and time  (Simultaneous filing. User may not have seen previous data.)   Level of Consciousness alert  (Simultaneous filing. User may not have seen previous data.)   Follows Commands and Answers Questions 100% of the time  (Simultaneous filing. User may not have seen previous data.)   Personal Safety and Judgment intact  (Simultaneous filing. User may not have seen previous data.)   Memory intact  (Simultaneous filing. User may not have seen previous data.)   Pain Assessment   Patient Currently in Pain Yes, see Vital Sign flowsheet  (Simultaneous filing. User may not have seen previous data.)   Integumentary/Edema   Integumentary/Edema Comments surgical wound covered by dressing. no obvious signs of concern  (Simultaneous filing. User may not have seen previous data.)   Posture    Posture Forward head position;Protracted shoulders  (Simultaneous filing. User may not have seen previous data.)   Range of Motion (ROM)   ROM Comment Right LE ROM limited secondary to pain/post-op precautions.     Strength   Strength Comments functional strength to allow for bed mobility, transfers, and home exercises.  (Simultaneous filing. User may not have seen previous data.)   Bed Mobility   Bed Mobility Comments Pt performs supine<>sit with SBA and use of bed  rail. Pt able to scoot independently in bed.  (Simultaneous filing. User may not have seen previous data.)   Transfer Skills   Transfer Comments t performs sit<>stand SBA within R hip precautions. Able to complete toilet transfer with use of grab bars.   Gait   Gait Comments Pt ambulated 50' with SBA and use of FWW. Able to navigate turn without pivoting.   Sensory Examination   Sensory Perception Comments no complaints of numbness or tingling  (Simultaneous filing. User may not have seen previous data.)   General Therapy Interventions   Planned Therapy Interventions bed mobility training;gait training;ROM;strengthening;transfer training;home program guidelines  (Simultaneous filing. User may not have seen previous data.)   Clinical Impression   Criteria for Skilled Therapeutic Intervention yes, treatment indicated  (Simultaneous filing. User may not have seen previous data.)   PT Diagnosis reduced mobility, decreased strength  (Simultaneous filing. User may not have seen previous data.)   Influenced by the following impairments pain, fatigue, s/p R ALE  (Simultaneous filing. User may not have seen previous data.)   Functional limitations due to impairments reduced transfers, limited gait, decreased activity with family  (Simultaneous filing. User may not have seen previous data.)   Clinical Presentation Stable/Uncomplicated  (Simultaneous filing. User may not have seen previous data.)   Clinical Presentation Rationale independent at prior level, vitals within tolerable level  (Simultaneous filing. User may not have seen previous data.)   Clinical Decision Making (Complexity) Low complexity  (Simultaneous filing. User may not have seen previous data.)   Therapy Frequency 2x/day  (Simultaneous filing. User may not have seen previous data.)   Predicted Duration of Therapy Intervention (days/wks) 3 days  (Simultaneous filing. User may not have seen previous data.)   Anticipated Equipment Needs at Discharge front  "wheeled walker  (Simultaneous filing. User may not have seen previous data.)   Anticipated Discharge Disposition Home with Outpatient Therapy  (Simultaneous filing. User may not have seen previous data.)   Risk & Benefits of therapy have been explained Yes  (Simultaneous filing. User may not have seen previous data.)   Patient, Family & other staff in agreement with plan of care Yes  (Simultaneous filing. User may not have seen previous data.)   [facility] [employer] \"6 Clicks\"   2016, Trustees of [facility], under license to [employer].  All rights reserved.   6 Clicks Short Forms Basic Mobility Inpatient Short Form  (Simultaneous filing. User may not have seen previous data.)   [facility]  \"6 Clicks\" V.2 Basic Mobility Inpatient Short Form   1. Turning from your back to your side while in a flat bed without using bedrails? 4 - None  (Simultaneous filing. User may not have seen previous data.)   2. Moving from lying on your back to sitting on the side of a flat bed without using bedrails? 4 - None  (Simultaneous filing. User may not have seen previous data.)   3. Moving to and from a bed to a chair (including a wheelchair)? 4 - None  (Simultaneous filing. User may not have seen previous data.)   4. Standing up from a chair using your arms (e.g., wheelchair, or bedside chair)? 4 - None  (Simultaneous filing. User may not have seen previous data.)   5. To walk in hospital room? 4 - None  (Simultaneous filing. User may not have seen previous data.)   6. Climbing 3-5 steps with a railing? 3 - A Little  (Simultaneous filing. User may not have seen previous data.)   Basic Mobility Raw Score (Score out of 24.Lower scores equate to lower levels of function) 23   Total Evaluation Time   Total Evaluation Time (Minutes) 13  (Simultaneous filing. User may not have seen previous data.)     "

## 2020-01-18 ENCOUNTER — APPOINTMENT (OUTPATIENT)
Dept: PHYSICAL THERAPY | Facility: CLINIC | Age: 55
DRG: 470 | End: 2020-01-18
Attending: ORTHOPAEDIC SURGERY
Payer: COMMERCIAL

## 2020-01-18 VITALS
OXYGEN SATURATION: 97 % | WEIGHT: 238.32 LBS | HEART RATE: 81 BPM | TEMPERATURE: 95.7 F | DIASTOLIC BLOOD PRESSURE: 76 MMHG | HEIGHT: 67 IN | RESPIRATION RATE: 16 BRPM | BODY MASS INDEX: 37.4 KG/M2 | SYSTOLIC BLOOD PRESSURE: 129 MMHG

## 2020-01-18 LAB
ANION GAP SERPL CALCULATED.3IONS-SCNC: 5 MMOL/L (ref 3–14)
BUN SERPL-MCNC: 22 MG/DL (ref 7–30)
CALCIUM SERPL-MCNC: 7.8 MG/DL (ref 8.5–10.1)
CHLORIDE SERPL-SCNC: 105 MMOL/L (ref 94–109)
CO2 SERPL-SCNC: 28 MMOL/L (ref 20–32)
CREAT SERPL-MCNC: 1 MG/DL (ref 0.52–1.04)
GFR SERPL CREATININE-BSD FRML MDRD: 63 ML/MIN/{1.73_M2}
GLUCOSE SERPL-MCNC: 105 MG/DL (ref 70–99)
HGB BLD-MCNC: 10.4 G/DL (ref 11.7–15.7)
POTASSIUM SERPL-SCNC: 4.2 MMOL/L (ref 3.4–5.3)
SODIUM SERPL-SCNC: 138 MMOL/L (ref 133–144)

## 2020-01-18 PROCEDURE — 36415 COLL VENOUS BLD VENIPUNCTURE: CPT | Performed by: STUDENT IN AN ORGANIZED HEALTH CARE EDUCATION/TRAINING PROGRAM

## 2020-01-18 PROCEDURE — 25000128 H RX IP 250 OP 636: Performed by: STUDENT IN AN ORGANIZED HEALTH CARE EDUCATION/TRAINING PROGRAM

## 2020-01-18 PROCEDURE — 97110 THERAPEUTIC EXERCISES: CPT | Mod: GP | Performed by: PHYSICAL THERAPIST

## 2020-01-18 PROCEDURE — 85018 HEMOGLOBIN: CPT | Performed by: STUDENT IN AN ORGANIZED HEALTH CARE EDUCATION/TRAINING PROGRAM

## 2020-01-18 PROCEDURE — 97530 THERAPEUTIC ACTIVITIES: CPT | Mod: GP | Performed by: PHYSICAL THERAPIST

## 2020-01-18 PROCEDURE — 80048 BASIC METABOLIC PNL TOTAL CA: CPT | Performed by: STUDENT IN AN ORGANIZED HEALTH CARE EDUCATION/TRAINING PROGRAM

## 2020-01-18 PROCEDURE — 25000132 ZZH RX MED GY IP 250 OP 250 PS 637: Performed by: STUDENT IN AN ORGANIZED HEALTH CARE EDUCATION/TRAINING PROGRAM

## 2020-01-18 PROCEDURE — 25000128 H RX IP 250 OP 636: Performed by: INTERNAL MEDICINE

## 2020-01-18 PROCEDURE — 97116 GAIT TRAINING THERAPY: CPT | Mod: GP | Performed by: PHYSICAL THERAPIST

## 2020-01-18 RX ORDER — ASPIRIN 325 MG
325 TABLET, DELAYED RELEASE (ENTERIC COATED) ORAL 2 TIMES DAILY
Qty: 60 TABLET | Refills: 0 | Status: SHIPPED | OUTPATIENT
Start: 2020-01-18 | End: 2020-12-03

## 2020-01-18 RX ORDER — OXYCODONE HYDROCHLORIDE 5 MG/1
5-10 TABLET ORAL
Qty: 40 TABLET | Refills: 0 | Status: SHIPPED | OUTPATIENT
Start: 2020-01-18 | End: 2020-12-03

## 2020-01-18 RX ADMIN — OXYCODONE HYDROCHLORIDE 10 MG: 5 TABLET ORAL at 12:45

## 2020-01-18 RX ADMIN — OXYCODONE HYDROCHLORIDE 5 MG: 5 TABLET ORAL at 01:57

## 2020-01-18 RX ADMIN — KETOROLAC TROMETHAMINE 15 MG: 15 INJECTION, SOLUTION INTRAMUSCULAR; INTRAVENOUS at 08:29

## 2020-01-18 RX ADMIN — OXYCODONE HYDROCHLORIDE 5 MG: 5 TABLET ORAL at 06:51

## 2020-01-18 RX ADMIN — OXYCODONE HYDROCHLORIDE 10 MG: 5 TABLET ORAL at 09:26

## 2020-01-18 RX ADMIN — SENNOSIDES AND DOCUSATE SODIUM 2 TABLET: 8.6; 5 TABLET ORAL at 08:29

## 2020-01-18 RX ADMIN — ACETAMINOPHEN 975 MG: 325 TABLET, FILM COATED ORAL at 06:18

## 2020-01-18 RX ADMIN — ASPIRIN 325 MG: 325 TABLET, DELAYED RELEASE ORAL at 08:29

## 2020-01-18 RX ADMIN — KETOROLAC TROMETHAMINE 15 MG: 15 INJECTION, SOLUTION INTRAMUSCULAR; INTRAVENOUS at 01:14

## 2020-01-18 RX ADMIN — OXYCODONE HYDROCHLORIDE 5 MG: 5 TABLET ORAL at 06:18

## 2020-01-18 RX ADMIN — CEFAZOLIN SODIUM 2 G: 2 INJECTION, SOLUTION INTRAVENOUS at 01:13

## 2020-01-18 NOTE — DISCHARGE SUMMARY
ORTHOPAEDIC DISCHARGE SUMMARY     Date of Admission: 1/17/2020  Date of Discharge: 1/18/2020  Disposition: Home  Staff Physician: Rashid Schuster MD  Primary Care Provider: Joseluis Gross    DISCHARGE DIAGNOSIS:  Osteoarthritis of right hip [M16.11]    PROCEDURES: Procedure(s):  Right total hip arthroplasty on 1/17/2020    BRIEF HISTORY:  53 yo female with end stage OA right hip.    HOSPITAL COURSE:    Surgery was without unexpected event. Licha Birch has done well post-operatively. Medicine was consulted post operatively to aid in management of medical comorbidities.  The patient received routine nursing cares and has remained medically stable. Vital signs have remained stable throughout the hospital stay. The patient is tolerating a regular diet without GI distress/nausea or vomiting. Voiding spontaneously in the post-operative period. PT & OT were consulted for evaluation, and all PT/OT goals have been met for safe mobility. The patient's post-operative pain is now controlled on oral medications, which will be available on discharge. Stool softeners have been used while taking pain medications to help prevent constipation. Licha Birch is deemed medically safe to discharge.     Antibiotics:  Ancef given periop and 24 hours postop.   DVT prophylaxis:  Aspirin 325 BID daily for 4 weeks  PT Progress:  Has met PT/OT goals for safe mobility.    Pain Meds:  Weaned off all IV pain meds by discharge.  Inpatient Events: No significant events or complications.     Discharge orders and instructions as below.    FOLLOWUP:    Follow up with Dr. Schuster at 4 weeks postoperatively.  Future Appointments   Date Time Provider Department Center   1/18/2020  7:00 PM UR OT [facility] [place]   1/27/2020  3:10 PM Shayla Georges, PT [facility]   2/3/2020 11:00 AM Edy Barillas, PT [facility]       [facility] [place] ([address] [place] [id number]). Call [phone] to schedule a  follow-up appointment at this location with your provider if you have not heard confirmation of your appointment in the next 3-5 business days    PLANNED DISCHARGE ORDERS:           Current Discharge Medication List      CONTINUE these medications which have NOT CHANGED    Details   acetaminophen (TYLENOL) 325 MG tablet Take 650 mg by mouth At Bedtime      amLODIPine (NORVASC) 5 MG tablet Take 5 mg by mouth daily      glucosamine 500 MG CAPS Take 1 tablet by mouth At Bedtime Glucosamine with tumeric      lisinopril-hydrochlorothiazide (PRINZIDE/ZESTORETIC) 20-12.5 MG tablet Take 1 tablet by mouth daily    Comments: Hold for sbp<110      aspirin (ASA) 325 MG EC tablet Take 1 tablet (325 mg) by mouth 2 times daily  Qty: 50 tablet, Refills: 0    Associated Diagnoses: Status post hip surgery      ibuprofen (ADVIL/MOTRIN) 200 MG capsule Take 200 mg by mouth 3 times daily Pt. Alternates with Aleve every morning. Last dose 10/09/2019. 10/11/2019      naproxen sodium 220 MG capsule Take 440 mg by mouth three times a week Pt. Alternates with Advil every morning. Pt. Took 10/11/2019      NEW MED Apply topically 2 times daily Hemp seed cream, used in the morning and bedtime.      Combination of Irene Japonica Seed oil, hemp oil, eucalyptus oil, peppermint, Boswella Extract, grapefruit, licorice, aloe vera, tumeric      nicotine (COMMIT) 2 MG lozenge Place 2 mg inside cheek 3 times daily      oxyCODONE (ROXICODONE) 5 MG tablet Take 1 tablet (5 mg) by mouth every 4 hours as needed for moderate to severe pain  Qty: 40 tablet, Refills: 0    Associated Diagnoses: Status post hip surgery             No discharge procedures on file.      Michael Clark MD  N Arthroplasty Fellow

## 2020-01-18 NOTE — PROGRESS NOTES
Pt. discharged at 1530 to home, was accompanied by daughter, and left with personal belongings. Pt. received complete discharge paperwork and po medications as filled by discharge pharmacy. Pt. was given times of last dose for all discharge medications in writing on discharge medication sheets. Discharge teaching included po medication, pain management, activity restrictions, dressing changes, and signs and symptoms of infection. Dressing supplies (4x4 gauze) and ice packs sent home. Pt. to follow up with Dr. Schuster in 4 weeks after surgery. Pt. had no further questions at the time of discharge and no unmet needs were identified.      Mirna Og RN on 1/18/2020 at 4:08 PM

## 2020-01-18 NOTE — PLAN OF CARE
Discharge Planner PT   Patient plan for discharge: home with family, outpatient PT  Current status: Pt completes bed mobility IND within hip precautions, transfers with Mod I and use of FWW. Pt ambulates 130' with SBA and use of FWW and completes 3 stairs x 2 with first trial with both rails and second trial with L hand rail (ascending) and use of cane in contralateral hand.  Barriers to return to prior living situation: pain, fatigue  Recommendations for discharge: home with family, outpatient PT  Rationale for recommendations: improve strength and ROM of R hip, increase independence, increase gait       Entered by: Clinton Andrade 01/18/2020 1:12 PM       Physical Therapy Discharge Summary    Reason for therapy discharge:    Pt has met adequate levels for goals to return home and continue therapy in outpatient setting.     Progress towards therapy goal(s). See goals on Care Plan in [facility] electronic health record for goal details.  Pt has demonstrated great progress towards goals and has achieved adequate level to discharge home.     Therapy recommendation(s):    Continued therapy is recommended.  Rationale/Recommendations:  continue therapy in outpatient setting.  Continue home exercise program.

## 2020-01-18 NOTE — PROGRESS NOTES
Orthopaedic Surgery Progress Note:       Subjective:   NAEO. Patient reports doing well. Pain well controlled on current regimen. Denies new onset tingling/numbness in operative extremity. Denies fever/chills/SOB/nause/vomiting. Oral intake +. Voids spontaneously. BM -. Flatus +. Passed PT. Would like to discharge today.    Objective:   Temp:  [96.5  F (35.8  C)-98.7  F (37.1  C)] 98.7  F (37.1  C)  Pulse:  [81-99] 81  Heart Rate:  [] 80  Resp:  [8-20] 16  BP: ()/(55-84) 98/65  SpO2:  [93 %-99 %] 94 %      Gen: NAD. Resting comfortably in bed  Resp: Breathing comfortably on RA  Right LE:  Dressing/ACE is c/d/i.   Drain is in place and patent. Minimal production  SILT in femoral, saphenous, sural, deep peroneal, superficial peroneal, and tibial n dist.   Fires EHL/FHL/TA/Gastroc with 5/5 strength    PT and DP pulses intact, 2+ and foot is wwp      Labs:  Recent Labs   Lab 01/18/20  0607 01/14/20  1810   WBC  --  10.3   HGB 10.4* 13.6   PLT  --  318            Assessment & Plan:   54 year old adult now s/p right ALE  on 1/17/2020 with Dr. Schuster    Activity: WBAT BLE. ROM as tolerated. Hip precautions.   Drain: Removed today.    Antibiotics: Ancef x 24 hours post op for surgical prophylaxis   Dressings: Aquacel to remain in place until POD 7   Diet: ADAT  Pain: PO/IV meds. Transition to PO meds only as patient tolerates  DVT ppx: Aspirin 325 BID starting POD#0. Continue x 4 weeks post op  Imaging: Post op films reviewed and are satisfactory. No further imaging need at this time.  Labs: Daily Hgb  PT/OT: Mobility, ROM, gait training, ADLs  Consults: Appreciate medicine co-management.  Dispo: Pending pain control and PT/OT recs. Expect patient to d/c to home with family today  Follow up: 4 weeks post op w/ Rashid Schuster MD Paul Hoogervorst MD 01/18/2020  Orthopaedic Surgery Arthroplasty Fellow

## 2020-01-18 NOTE — PLAN OF CARE
Pt is A&Ox4. VSS. LS clear, on RA. BS active, LBM 1/17/2020. Voiding well. Pain managed with toradol and oxycodone. R hip surgical dressing is c/d/I. SUDHEER patent and draining well. Pt up with 1 assist. Continue to monitor.

## 2020-01-18 NOTE — PROGRESS NOTES
Pt was experiencing chills and requested to have temp taken around 1500, temp of 100.6, no other acute symptoms/redness/swelling around surgical site. Ortho on call MD albin stated pt still ok to discharge. Instructed pt to drink lots of fluids at home, continue using IS, and if fever is not improving with tylenol to either call or come in. Other VSS. No further questions from pt.

## 2020-01-18 NOTE — PLAN OF CARE
VS: VSS. Denies CP/SOB   O2: >90% on 1/2 LPM, IS encouraged. Lung sounds clear    Output: Voided x2 without pain or difficulty, last PVR 34   Last BM: 1/16/20 per pt report    Activity: Up with 1 assist, WBAT. Uses gait belt and walker    Up for meals? Declined    Skin: Incision, rash on LLE, marked no change   Pain: Pain in R hip managing with 5 of oxy q 3 hours, shcheduled tylenol and toradol. Ice packs    CMS: Intact    Dressing: CDI   Diet: Regular, ate all of lunch and dinner. Tolerated well   LDA: PIV in R forearm infusing btw abx, SUDHEER output of 210 since surgery    Equipment: IV pole, walker, PCDs, abduction pillow    Plan: TBD   Additional Info:

## 2020-01-18 NOTE — CONSULTS
Consult Date:  01/17/2020      INTERNAL MEDICINE CONSULTATION      ASSESSMENT:   1.  Status post right total hip arthroplasty.  The patient is doing well.   2.  Hypertension, controlled perioperatively.   3.  Unknown coronary artery status.  I do not believe the patient has ever had a formal coronary artery evaluation.      RECOMMENDATIONS:  Routine postoperative labs are ordered.  Oxycodone has been ordered for pain, which was well tolerated and beneficial for the patient during her last hospitalization.  Antihypertensive medications will be held and reassessed in the morning based on her blood pressure, electrolytes and renal function.  DVT prophylaxis will be with aspirin per Dr. Schuster's protocol.      DISCUSSION:  Licha Birch is a pleasant 54-year-old female who underwent a right total hip arthroplasty today by Dr. Schuster for the treatment of osteoarthritis of the hip.  I have been asked to see her by Dr. Schuster to assess medical concerns including hypertension.      Please see Dr. Schuster's notes in the chart for details regarding the patient's orthopedic history and indication for surgery.  The patient is status post left total hip arthroplasty in 11/2019, which was uncomplicated.  The patient reports doing well following discharge with oxycodone for pain control.  She denies interval medical issues.        The events of surgery are noted.  The patient had general anesthesia.  Estimated blood loss was 100 mL.  Vital signs are stable throughout the case.      Ms. Birch is seen by me shortly after admission to the Orthopedic unit.  She reports feeling well.  She has minimal pain in her hip.  She denies cough, chest pain, shortness of breath, nausea, vomiting.      PAST MEDICAL HISTORY:  Remarkable for:    1.  Osteoarthritis status post left total hip arthroplasty as noted above.     2.  Hypertension.     3.  The patient is unaware of a history of coronary artery disease.  It is not clear that she has never had a  formal coronary artery evaluation.      MEDICATIONS PRIOR TO ADMISSION:   1.  Tylenol on a p.r.n. basis.   2.  Amlodipine 5 mg daily.   3.  Glucosamine 500 mg daily.   4.  Lisinopril/hydrochlorothiazide 20/12.5 mg one tablet daily.   5.  Ibuprofen daily alternating with Aleve daily.      MEDICATION ALLERGIES:  NONE.      SOCIAL HISTORY:  The patient lives with family members.  She is a former smoker, having stopped 3 months ago.  She drinks alcohol approximately 1 drink 3 days per week.      FAMILY HISTORY:  Reviewed by me and is noncontributory.      PAST SURGICAL HISTORY:  Include her previous left total hip arthroplasty, right knee arthroscopy.      PHYSICAL EXAMINATION:   GENERAL:  She is a pleasant, well-appearing female who appears comfortable.  She is alert, fully oriented x 3, lying comfortably in bed.   VITAL SIGNS:  Vital signs are stable.     LUNGS:  Clear.   CARDIAC:  Regular rhythm without murmurs.   ABDOMEN:  Soft, nontender, nondistended.   EXTREMITIES:  No calf edema.      LABORATORY DATA:  Preoperatively included a normal basic metabolic panel.  I do not have access to any recent EKGs.         CHI METCALF MD             D: 2020   T: 2020   MT:       Name:     ART MEZA   MRN:      [id number]        Account:       [id number]   :      1965           Consult Date:  2020      Document: [id number]       cc: Rashid Schuster MD

## 2020-01-18 NOTE — PLAN OF CARE
OT: Pt has no OT needs per PT. Pt had past hip surgery and declines need for OT. Will complete orders.

## 2020-01-18 NOTE — PROGRESS NOTES
Patient was seen, course reviewed with nursing staff.    Patient is doing well, has been walking in the fontana with therapy, hopes to discharge to home today.    She denies cough, chest pain, shortness of breath, abdominal pain.  She is voiding without difficulty.    Afebrile  Blood pressures generally 100s/  Heart rate 80s    Alert, fully oriented, appears comfortable.  Lungs clear  CV regular rhythm  Abdomen soft, nontender, nondistended  No calf edema.    Results for ART MEZA (MRN [id number]) as of 1/18/2020 11:05   Ref. Range 1/18/2020 06:07   Sodium Latest Ref Range: 133 - 144 mmol/L 138   Potassium Latest Ref Range: 3.4 - 5.3 mmol/L 4.2   Chloride Latest Ref Range: 94 - 109 mmol/L 105   Carbon Dioxide Latest Ref Range: 20 - 32 mmol/L 28   Urea Nitrogen Latest Ref Range: 7 - 30 mg/dL 22   Creatinine Latest Ref Range: 0.52 - 1.04 mg/dL 1.00   GFR Estimate Latest Ref Range: >60 mL/min/1.73_m2 63   GFR Estimate If Black Latest Ref Range: >60 mL/min/1.73_m2 73   Calcium Latest Ref Range: 8.5 - 10.1 mg/dL 7.8 (L)   Anion Gap Latest Ref Range: 3 - 14 mmol/L 5   Glucose Latest Ref Range: 70 - 99 mg/dL 105 (H)   Hemoglobin Latest Ref Range: 11.7 - 15.7 g/dL 10.4 (L)       Assessment    Status post right total hip arthroplasty patient is doing quite well postop with.  History of hypertension, on amlodipine and lisinopril/hydrochlorthiazide preop.  Blood pressure relatively low postop secondary to anesthesia.  Acute blood loss anemia, mild, not clinically significant.    Plan  Patient is medically stable for discharge.  She will be advised to hold her blood pressure medications for now, monitor blood pressures at home.  She was given specific instructions for resuming blood pressure medications after discharge, as her blood pressures dictate.

## 2020-01-19 ENCOUNTER — PATIENT OUTREACH (OUTPATIENT)
Dept: CARE COORDINATION | Facility: CLINIC | Age: 55
End: 2020-01-19

## 2020-01-20 NOTE — TELEPHONE ENCOUNTER
[facility]: Post-Discharge Note  SITUATION                                                      Admission:    Admission Date: 01/17/20   Reason for Admission: Osteoarthritis of right hip   Discharge:   Discharge Date: 01/18/20  Discharge Diagnosis: Osteoarthritis of right hip   Discharge Service: Orthopedics     BACKGROUND                                                      Surgery was without unexpected event. Licha Birch has done well post-operatively. Medicine was consulted post operatively to aid in management of medical comorbidities.  The patient received routine nursing cares and has remained medically stable. Vital signs have remained stable throughout the hospital stay. The patient is tolerating a regular diet without GI distress/nausea or vomiting. Voiding spontaneously in the post-operative period. PT & OT were consulted for evaluation, and all PT/OT goals have been met for safe mobility. The patient's post-operative pain is now controlled on oral medications, which will be available on discharge. Stool softeners have been used while taking pain medications to help prevent constipation. Licha Birch is deemed medically safe to discharge.     ASSESSMENT      Discharge Assessment  Patient reports symptoms are: Improved(Patient reports things are going well. Pain is managable, denies any new or worsening symptoms. )  Does the patient have all of their medications?: Yes  Does patient know what their new medications are for?: Yes  Does patient have a follow-up appointment scheduled?: Yes  Does patient have any other questions or concerns?: No    Post-op  Did the patient have surgery or a procedure: Yes  Incision: healing  Drainage: No  Bleeding: none  Fever: No  Chills: No  Redness: No  Warmth: No  Swelling: No  Incision site pain: No  Eating & Drinking: eating and drinking without complaints/concerns  PO Intake: regular diet  Bowel Function: normal  Date of last BM: 01/20/20  Urinary  Status: voiding without complaint/concerns    PLAN                                                      Outpatient Plan:      Follow up with Dr. Schuster at 4 weeks postoperatively.    Future Appointments   Date Time Provider Department Center   1/27/2020  3:10 PM Shayla Georges, PT [facility]   2/3/2020 11:00 AM Edy Barillas, PT [facility]           Charleen Hammer, CMA

## 2020-01-23 ENCOUNTER — TELEPHONE (OUTPATIENT)
Dept: ORTHOPEDICS | Facility: CLINIC | Age: 55
End: 2020-01-23

## 2020-01-23 NOTE — TELEPHONE ENCOUNTER
Called and spoke to patient. Date of Surgery was 1/17/2020. Date of discharge was Saturday, 1/18/2020.  She had left a message regarding her drain hole.   Bandage was changed night before. Drainage looks pink and mostly clear yellowish.  Tinted yellow on the bandage. Changed twice so. Drainage started a few days ago. She stated that drainage amount as decreased. No fever, no signs or symptoms of infection.     She had her right hip joint replacement.   She said she is doing ok and managing her pain.   Patient will contact team if she experiences fever, increased pain, warmth, or changes to drainage.    She is scheduled for her 4 week postop check with Dr. Schuster in Feb.    Leora Mosqueda RN on 1/23/2020 at 4:18 PM

## 2020-01-27 ENCOUNTER — THERAPY VISIT (OUTPATIENT)
Dept: PHYSICAL THERAPY | Facility: CLINIC | Age: 55
End: 2020-01-27
Payer: COMMERCIAL

## 2020-01-27 ENCOUNTER — DOCUMENTATION ONLY (OUTPATIENT)
Dept: CARE COORDINATION | Facility: CLINIC | Age: 55
End: 2020-01-27

## 2020-01-27 DIAGNOSIS — M25.551 HIP PAIN, RIGHT: ICD-10-CM

## 2020-01-27 PROCEDURE — 97161 PT EVAL LOW COMPLEX 20 MIN: CPT | Mod: GP | Performed by: PHYSICAL THERAPIST

## 2020-01-27 PROCEDURE — 97110 THERAPEUTIC EXERCISES: CPT | Mod: GP | Performed by: PHYSICAL THERAPIST

## 2020-01-27 ASSESSMENT — ACTIVITIES OF DAILY LIVING (ADL)
PUTTING_ON_SOCKS_AND_SHOES: EXTREME DIFFICULTY
STANDING_FOR_15_MINUTES: MODERATE DIFFICULTY
WALKING_APPROXIMATELY_10_MINUTES: MODERATE DIFFICULTY
STEPPING_UP_AND_DOWN_CURBS: MODERATE DIFFICULTY
GETTING_INTO_AND_OUT_OF_A_BATHTUB: EXTREME DIFFICULTY
ROLLING_OVER_IN_BED: EXTREME DIFFICULTY
GOING_DOWN_1_FLIGHT_OF_STAIRS: MODERATE DIFFICULTY
HOS_ADL_COUNT: 16
LIGHT_TO_MODERATE_WORK: MODERATE DIFFICULTY
HOS_ADL_ITEM_SCORE_TOTAL: 21
WALKING_UP_STEEP_HILLS: UNABLE TO DO
WALKING_INITIALLY: SLIGHT DIFFICULTY
HOS_ADL_SCORE(%): 32.81
HEAVY_WORK: UNABLE TO DO
HOW_WOULD_YOU_RATE_YOUR_CURRENT_LEVEL_OF_FUNCTION_DURING_YOUR_USUAL_ACTIVITIES_OF_DAILY_LIVING_FROM_0_TO_100_WITH_100_BEING_YOUR_LEVEL_OF_FUNCTION_PRIOR_TO_YOUR_HIP_PROBLEM_AND_0_BEING_THE_INABILITY_TO_PERFORM_ANY_OF_YOUR_USUAL_DAILY_ACTIVITIES?: 25
WALKING_DOWN_STEEP_HILLS: UNABLE TO DO
RECREATIONAL_ACTIVITIES: UNABLE TO DO
DEEP_SQUATTING: UNABLE TO DO
HOS_ADL_HIGHEST_POTENTIAL_SCORE: 64
WALKING_15_MINUTES_OR_GREATER: MODERATE DIFFICULTY
SITTING_FOR_15_MINUTES: NO DIFFICULTY AT ALL
GETTING_INTO_AND_OUT_OF_AN_AVERAGE_CAR: MODERATE DIFFICULTY
GOING_UP_1_FLIGHT_OF_STAIRS: MODERATE DIFFICULTY

## 2020-01-27 NOTE — PROGRESS NOTES
[facility] Initial Evaluation  Subjective:  [facility] [place]                  Physical Therapy Initial Evaluation: Subjective History  Date of Surgery: 1/17/20.    Surgical Procedure/Limb: right ALE  Surgeon Name: Dr. Schuster  Average Daily Pain Levels: 5-7/10 (Location: post hip and outside of thigh slightly above knee and below ; Quality: Sharp and Aching/Throbbing)  Other Symptoms: swelling from knee to foot  Symptom Mgmt Strategies: meds, sitting in a recliner or bed  Prior orthopaedic history/procedures: hx of left ALE and reports this one is slower on the recovery  Prior non-operative management: PT  Next MD Appt Date: 2/10/20    Functional limitations following procedure: able to walk, hike in spring and play with grandsons-4 and 5  Previous level of function: unrestricted walking    Patient Employment: Business Analysist  Typical Physical Activities: desk work  1 day in hospital-house with stairs.  Pt lives with sister and son.  Currently using walker but has a cane.  Sxs are up and down but also weaning out of meds.  MD restrictions: no hip add past midline, IR and flex past 90  2/21/20 RTW -going to return gradual with childcare as new baby arrives    Post-Operative Physical Therapy Examination    Physical Mobility Status  Gait: slow antalgic gait with walker  Transfers:  Slow and laborous getting into bed, chair  SLS unable right, left 5 seconds    Anthropometric Measures     Right Left    Joint ROM      Hip flex 60 deg 90 deg    Extension 0 deg 0 deg    abd 25 deg 50 deg    ER                            Quadriceps Muscle Activation Right Left   Isometric Quad Activation Good Normal   Straight Leg Raising Unable to perform No extensor lag     Status of Incision: Clean & healing    Assessment/Plan  Pt shows appropriate mobility motion and function for surgical timeline    Patient is a 54 year old adult with right side hip complaints.    Patient has the following significant findings with  corresponding treatment plan.                Diagnosis 1:  Right ALE  Pain -  hot/cold therapy, self management, education and home program  Decreased ROM/flexibility - manual therapy and therapeutic exercise  Decreased strength - therapeutic exercise and therapeutic activities  Impaired balance - neuro re-education and therapeutic activities  Decreased function - therapeutic activities    Therapy Evaluation Codes:   1) History comprised of:   Personal factors that impact the plan of care:      None.    Comorbidity factors that impact the plan of care are:      None.     Medications impacting care: None.  2) Examination of Body Systems comprised of:   Body structures and functions that impact the plan of care:      Hip.   Activity limitations that impact the plan of care are:      Bending, Cooking, Driving, Sitting, Squatting/kneeling, Stairs and Walking.  3) Clinical presentation characteristics are:   Stable/Uncomplicated.  4) Decision-Making    Low complexity using standardized patient assessment instrument and/or measureable assessment of functional outcome.  Cumulative Therapy Evaluation is: Low complexity.    Previous and current functional limitations:  (See Goal Flow Sheet for this information)    Short term and Long term goals: (See Goal Flow Sheet for this information)     Communication ability:  Patient appears to be able to clearly communicate and understand verbal and written communication and follow directions correctly.  Treatment Explanation - The following has been discussed with the patient:   RX ordered/plan of care  Anticipated outcomes  Possible risks and side effects  This patient would benefit from PT intervention to resume normal activities.   Rehab potential is good.    Frequency:  1 X week, once daily  Duration:  for 12 weeks  Discharge Plan:  Achieve all LTG.  Independent in home treatment program.  Reach maximal therapeutic benefit.    Please refer to the daily flowsheet for treatment  today, total treatment time and time spent performing 1:1 timed codes.       Objective:  System    Physical Exam    General     ROS

## 2020-02-07 ENCOUNTER — THERAPY VISIT (OUTPATIENT)
Dept: PHYSICAL THERAPY | Facility: CLINIC | Age: 55
End: 2020-02-07
Payer: COMMERCIAL

## 2020-02-07 DIAGNOSIS — Z98.890 STATUS POST HIP SURGERY: Primary | ICD-10-CM

## 2020-02-07 DIAGNOSIS — M25.551 HIP PAIN, RIGHT: ICD-10-CM

## 2020-02-07 PROCEDURE — 97110 THERAPEUTIC EXERCISES: CPT | Mod: GP | Performed by: PHYSICAL THERAPIST

## 2020-02-07 PROCEDURE — 97140 MANUAL THERAPY 1/> REGIONS: CPT | Mod: GP | Performed by: PHYSICAL THERAPIST

## 2020-02-07 PROCEDURE — 97112 NEUROMUSCULAR REEDUCATION: CPT | Mod: GP | Performed by: PHYSICAL THERAPIST

## 2020-02-10 ENCOUNTER — OFFICE VISIT (OUTPATIENT)
Dept: ORTHOPEDICS | Facility: CLINIC | Age: 55
End: 2020-02-10
Payer: COMMERCIAL

## 2020-02-10 ENCOUNTER — ANCILLARY PROCEDURE (OUTPATIENT)
Dept: GENERAL RADIOLOGY | Facility: CLINIC | Age: 55
End: 2020-02-10
Attending: ORTHOPAEDIC SURGERY
Payer: COMMERCIAL

## 2020-02-10 DIAGNOSIS — Z98.890 STATUS POST HIP SURGERY: ICD-10-CM

## 2020-02-10 DIAGNOSIS — Z98.890 STATUS POST HIP SURGERY: Primary | ICD-10-CM

## 2020-02-10 ASSESSMENT — ENCOUNTER SYMPTOMS
ORTHOPNEA: 0
SKIN CHANGES: 0
NAIL CHANGES: 0
MUSCLE CRAMPS: 0
HYPERTENSION: 1
LIGHT-HEADEDNESS: 0
ARTHRALGIAS: 1
POOR WOUND HEALING: 0
EXERCISE INTOLERANCE: 0
HYPOTENSION: 0
JOINT SWELLING: 0
LEG PAIN: 0
STIFFNESS: 0
SLEEP DISTURBANCES DUE TO BREATHING: 0
SYNCOPE: 0
MUSCLE WEAKNESS: 1
NECK PAIN: 0
MYALGIAS: 0
PALPITATIONS: 1
BACK PAIN: 0

## 2020-02-10 ASSESSMENT — HOOS S4: HOW SEVERE IS YOUR HIP JOINT STIFFNESS AFTER FIRST WAKENING IN THE MORNING?: MILD

## 2020-02-10 NOTE — LETTER
Return to Work  February 10, 2020     Seen today: yes    Patient:  Licha Birch  :   1965  MRN:     [id number]  Physician: ADELIA GAMING    Licha Birch may return to work on Date: 2020.      Patient limitations:  None.  She is still recovering from hip surgery and may require some breaks in activity due to residual stiffness or discomfort for a few more weeks.      May return full time on gradual basis as elected by Ms. Birch.      Electronically signed by MD Adelia Saavedra MD [employer] Professor  Oncology and Adult Reconstructive Surgery  Dept Orthopaedic Surgery, [facility]  [phone] office, [phone] pager  [email or web address]

## 2020-02-10 NOTE — LETTER
2/10/2020       RE: Licha Birch  [address] [address]     Dear Colleague,    Thank you for referring your patient, Licha Birch, to the [facility] at [facility]. Please see a copy of my visit note below.      [facility]  Orthopaedic Surgery, Joint Replacement Consultation  by Rashid Schuster M.D.    Licha Birch MRN# [id number]   Age: 54 year old YOB: 1965     Requesting physician: Referred Self  Joseluis Gross     Background history:  DX:  1. Bilateral hip OA     TREATMENTS:  1. 1/2019, 7/2019 - US guided corticosteroid injection right hip (Dr. Jay)  2. 10/25/2019 - Total left hip arthroplasty, Dr. Schuster, [facility]  3. 1/17/2020 - Total right hip arthroplasty, Dr. Schuster, [facility]    Postoperative hip replacement follow-up clinic visit    Licha Birch is doing well after last surgery listed above. Preoperative hip pain is mostly resolved. She is using a cane when walking around outside. No further concerns at this time.    EXAM:  Incision healing, clean and dry.  Pain free hip motion.  Range of motion limits not tested given early post-op status.  Leg/ankle edema: none  Femoral, peroneal and tibial nerve function: normal  Gait: Minimal limp  Level pelvis when standing.    IMAGING:  Radiographs demonstrate the hip implant in satisfactory position without evidence of any complication.    IMPRESSION:  Excellent outcome, to date, after hip replacement.  No complications evident.     PLAN:    Continue range of motion exercises and stretching.    Hip abductor and quadriceps strengthening exercises.    Weight bearing status: as tolerated    Discontinue DVT prophylaxis meds (i.e., warfarin or ASA) unless required for another indication.    Patient given instructions re: prophylactic antibiotic recommendations during dental work.    Provide work restriction note.     Next follow-up visit at 1 year after surgery or sooner as  needed.       Rashid Schuster M.D.  [employer] Family Professor  Oncology and Adult Reconstructive Surgery  Dept Orthopaedic Surgery, [facility]  [phone] office  [email or web address]    Scribe Disclosure:  I, Deon Rodriguez, am serving as a scribe to document services personally performed by Rashid Schuster MD at this visit, based upon the provider's statements to me. All documentation has been reviewed by the aforementioned provider prior to being entered into the official medical record.       HOOS Hip Dysfunction & Osteoarthritis Outcome Questionnaire    Hip Dysfunction & Osteoarthritis Outcome Score (HOOS), English Version LK 2.0 (Alex Bergeron, Zoe DAVIS, 2003) 2/10/2020   S1. Do you feel grinding, hear clicking or any other type of noise from your hip? Never   S2. Difficulties spreading legs wide apart None   S3. Difficulties to stride out when walking Mild   S4. How severe is your hip joint stiffness after first wakening in the morning? Mild   S5. How severe is your hip stiffness after sitting, lying or resting LATER IN THE DAY? None   Symptom Count -   Symptom Sum -   Symptom Mean -   Symptom Subscale Score -   P1. How often is your hip painful? Daily   P2. Straightening your hip fully None   P3. Bending your hip FULLY None   P4. Walking on a flat surface None   P5. Going up or down stairs None   P6. At night while in bed Mild   P7. Sitting or lying None   P8. Standing upright Mild   P9. Walking on a hard surface (asphalt, concrete, etc.) None   P10. Walking on an uneven surface None   Pain Count -   Pain Sum -   Pain Mean -   Pain Subscale Score -   A1. Descending stairs Mild   A2. Ascending stairs Mild   A3. Rising from sitting Mild   A4. Standing Mild   A5. Bending to the floor/ an object Mild   A6. Walking on a flat surface None   A7. Getting in/out of car Mild   A8. Going shopping Mild   A9. Putting on socks/stockings Mild   A10. Rising from bed None   A11. Taking off  socks/stockings -   A12. Lying in bed (turning over, maintaining hip position) -   A13. Getting in/out of bed -   A14. Sitting -   A15. Getting on/off toilet -   A16. Heavy domestic duties (moving heavy boxes, scrubbing floors, etc.) -   A17. Light domestic duties (cooking, dusting, etc.) -   ADL Count -   ADL Sum -   ADL Mean -   ADL Subscale Score -   SP1. Squatting -   SP2. Running -   SP3. Twisting/pivoting on loaded leg -   SP4. Walking on uneven surface -   Sports/Rec Count -   Sports/Rec Sum -   Sports/Rec Mean -   Sports/Rec Subscale Score -   Q1. How often are you aware of your hip problem? -   Q2. Have you modified you life style to avoid activities potentially damaging to your hip? -   Q3. How much are you troubled with lack of confidence in your hip? -   Q4. In general, how much difficulty do you have with your hip? -   QOL Count -   QOL Sum -   QOL Mean -   Quality of Life Subscale Score -        Again, thank you for allowing me to participate in the care of your patient.      Sincerely,    Rashid Schuster MD

## 2020-02-10 NOTE — NURSING NOTE
"Chief Complaint   Patient presents with     RECHECK     followup right total hip arthroplasty DOS 1/17/20        54 year old  1965    There were no vitals taken for this visit.      Date/Surgery/Surgeon/Hospital:  1. Right hip replacement DOS 1/17/20 -Dr. Schuster                 Pain Assessment  Patient Currently in Pain: Yes  0-10 Pain Scale: 2  Primary Pain Location: Hip(right)  Pain Descriptors: (\"minimal\")                    [id number] - [place], [place] - [address] [place], [place] [address]PENTEALEENA BERMUDEZ  [facility]    No Known Allergies    Current Outpatient Medications   Medication     acetaminophen (TYLENOL) 325 MG tablet     amLODIPine (NORVASC) 5 MG tablet     aspirin (ASA) 325 MG EC tablet     glucosamine 500 MG CAPS     ibuprofen (ADVIL/MOTRIN) 200 MG capsule     lisinopril-hydrochlorothiazide (PRINZIDE/ZESTORETIC) 20-12.5 MG tablet     naproxen sodium 220 MG capsule     NEW MED     nicotine (COMMIT) 2 MG lozenge     oxyCODONE (ROXICODONE) 5 MG tablet     No current facility-administered medications for this visit.            Questionnaires:    HOOS Hip Dysfunction & Osteoarthritis Outcome Questionnaire    Hip Dysfunction & Osteoarthritis Outcome Score (HOOS), English Version LK 2.0 (Lali RAMOS, Alex E, Zoe ROTHMAN., 2003) 8/15/2019   S1. Do you feel grinding, hear clicking or any other type of noise from your hip? Never   S2. Difficulties spreading legs wide apart Severe   S3. Difficulties to stride out when walking Severe   S4. How severe is your hip joint stiffness after first wakening in the morning? Severe   S5. How severe is your hip stiffness after sitting, lying or resting LATER IN THE DAY? Severe   Symptom Count 5   Symptom Sum 12   Symptom Mean 2.4   Symptom Subscale Score 40   P1. How often is your hip painful? Daily   P2. Straightening your hip fully None   P3. Bending your hip FULLY Severe   P4. Walking on a flat surface Moderate   P5. Going up or down stairs Severe   P6. At " night while in bed Mild   P7. Sitting or lying None   P8. Standing upright Moderate   P9. Walking on a hard surface (asphalt, concrete, etc.) Severe   P10. Walking on an uneven surface Severe   Pain Count 10   Pain Sum 20   Pain Mean 2   Pain Subscale Score 50   A1. Descending stairs Moderate   A2. Ascending stairs Severe   A3. Rising from sitting Severe   A4. Standing Severe   A5. Bending to the floor/ an object Severe   A6. Walking on a flat surface Severe   A7. Getting in/out of car Severe   A8. Going shopping Severe   A9. Putting on socks/stockings Severe   A10. Rising from bed Moderate   A11. Taking off socks/stockings Severe   A12. Lying in bed (turning over, maintaining hip position) Moderate   A13. Getting in/out of bed Moderate   A14. Sitting Mild   A15. Getting on/off toilet Mild   A16. Heavy domestic duties (moving heavy boxes, scrubbing floors, etc.) Severe   A17. Light domestic duties (cooking, dusting, etc.) Severe   ADL Count 17   ADL Sum 43   ADL Mean 2.52   ADL Subscale Score 36.76   SP1. Squatting Severe   SP2. Running Severe   SP3. Twisting/pivoting on loaded leg Severe   SP4. Walking on uneven surface Severe   Sports/Rec Count 4   Sports/Rec Sum 12   Sports/Rec Mean 3   Sports/Rec Subscale Score 25   Q1. How often are you aware of your hip problem? Daily   Q2. Have you modified you life style to avoid activities potentially damaging to your hip? Severely   Q3. How much are you troubled with lack of confidence in your hip? Extremely   Q4. In general, how much difficulty do you have with your hip? Severe   QOL Count 4   QOL Sum 13   QOL Mean 3.25   Quality of Life Subscale Score 18.75              Promis 10 Assessment    PROMIS 10 8/15/2019   In general, would you say your health is: Very good   In general, would you say your quality of life is: Good   In general, how would you rate your physical health? Very good   In general, how would you rate your mental health, including your mood and  your ability to think? Very good   In general, how would you rate your satisfaction with your social activities and relationships? Very good   In general, please rate how well you carry out your usual social activities and roles Good   To what extent are you able to carry out your everyday physical activities such as walking, climbing stairs, carrying groceries, or moving a chair? A little   How often have you been bothered by emotional problems such as feeling anxious, depressed or irritable? Never   How would you rate your fatigue on average? Mild   How would you rate your pain on average?   0 = No Pain  to  10 = Worst Imaginable Pain 7   In general, would you say your health is: 4   In general, would you say your quality of life is: 3   In general, how would you rate your physical health? 4   In general, how would you rate your mental health, including your mood and your ability to think? 4   In general, how would you rate your satisfaction with your social activities and relationships? 4   In general, please rate how well you carry out your usual social activities and roles. (This includes activities at home, at work and in your community, and responsibilities as a parent, child, spouse, employee, friend, etc.) 3   To what extent are you able to carry out your everyday physical activities such as walking, climbing stairs, carrying groceries, or moving a chair? 2   In the past 7 days, how often have you been bothered by emotional problems such as feeling anxious, depressed, or irritable? 1   In the past 7 days, how would you rate your fatigue on average? 2   In the past 7 days, how would you rate your pain on average, where 0 means no pain, and 10 means worst imaginable pain? 7   Global Mental Health Score 16   Global Physical Health Score 12   PROMIS TOTAL - SUBSCORES 28   Some recent data might be hidden

## 2020-02-10 NOTE — PROGRESS NOTES
[facility]  Orthopaedic Surgery, Joint Replacement Consultation  by Rashid Schuster M.D.    Licha Birch MRN# [id number]   Age: 54 year old YOB: 1965     Requesting physician: Referred Self  Joseluis Gross     Background history:  DX:  1. Bilateral hip OA     TREATMENTS:  1. 1/2019, 7/2019 - US guided corticosteroid injection right hip (Dr. Jay)  2. 10/25/2019 - Total left hip arthroplasty, Dr. Schuster, [facility]  3. 1/17/2020 - Total right hip arthroplasty, Dr. Schuster, [facility]    Postoperative hip replacement follow-up clinic visit    Licha Birch is doing well after last surgery listed above. Preoperative hip pain is mostly resolved. She is using a cane when walking around outside. No further concerns at this time.    EXAM:  Incision healing, clean and dry.  Pain free hip motion.  Range of motion limits not tested given early post-op status.  Leg/ankle edema: none  Femoral, peroneal and tibial nerve function: normal  Gait: Minimal limp  Level pelvis when standing.    IMAGING:  Radiographs demonstrate the hip implant in satisfactory position without evidence of any complication.    IMPRESSION:  Excellent outcome, to date, after hip replacement.  No complications evident.     PLAN:    Continue range of motion exercises and stretching.    Hip abductor and quadriceps strengthening exercises.    Weight bearing status: as tolerated    Discontinue DVT prophylaxis meds (i.e., warfarin or ASA) unless required for another indication.    Patient given instructions re: prophylactic antibiotic recommendations during dental work.    Provide work restriction note.     Next follow-up visit at 1 year after surgery or sooner as needed.       Rashid Schuster M.D.  [employer] Family Professor  Oncology and Adult Reconstructive Surgery  Dept Orthopaedic Surgery, [facility]  [phone] office  [email or web address]    Scribe Disclosure:  IDeon, am serving as a scribe to document services  personally performed by Rashid Schuster MD at this visit, based upon the provider's statements to me. All documentation has been reviewed by the aforementioned provider prior to being entered into the official medical record.       HOOS Hip Dysfunction & Osteoarthritis Outcome Questionnaire    Hip Dysfunction & Osteoarthritis Outcome Score (HOOS), English Version LK 2.0 (Alex Bergeron Mannevik E., 2003) 2/10/2020   S1. Do you feel grinding, hear clicking or any other type of noise from your hip? Never   S2. Difficulties spreading legs wide apart None   S3. Difficulties to stride out when walking Mild   S4. How severe is your hip joint stiffness after first wakening in the morning? Mild   S5. How severe is your hip stiffness after sitting, lying or resting LATER IN THE DAY? None   Symptom Count -   Symptom Sum -   Symptom Mean -   Symptom Subscale Score -   P1. How often is your hip painful? Daily   P2. Straightening your hip fully None   P3. Bending your hip FULLY None   P4. Walking on a flat surface None   P5. Going up or down stairs None   P6. At night while in bed Mild   P7. Sitting or lying None   P8. Standing upright Mild   P9. Walking on a hard surface (asphalt, concrete, etc.) None   P10. Walking on an uneven surface None   Pain Count -   Pain Sum -   Pain Mean -   Pain Subscale Score -   A1. Descending stairs Mild   A2. Ascending stairs Mild   A3. Rising from sitting Mild   A4. Standing Mild   A5. Bending to the floor/ an object Mild   A6. Walking on a flat surface None   A7. Getting in/out of car Mild   A8. Going shopping Mild   A9. Putting on socks/stockings Mild   A10. Rising from bed None   A11. Taking off socks/stockings -   A12. Lying in bed (turning over, maintaining hip position) -   A13. Getting in/out of bed -   A14. Sitting -   A15. Getting on/off toilet -   A16. Heavy domestic duties (moving heavy boxes, scrubbing floors, etc.) -   A17. Light domestic duties (cooking, dusting,  etc.) -   ADL Count -   ADL Sum -   ADL Mean -   ADL Subscale Score -   SP1. Squatting -   SP2. Running -   SP3. Twisting/pivoting on loaded leg -   SP4. Walking on uneven surface -   Sports/Rec Count -   Sports/Rec Sum -   Sports/Rec Mean -   Sports/Rec Subscale Score -   Q1. How often are you aware of your hip problem? -   Q2. Have you modified you life style to avoid activities potentially damaging to your hip? -   Q3. How much are you troubled with lack of confidence in your hip? -   Q4. In general, how much difficulty do you have with your hip? -   QOL Count -   QOL Sum -   QOL Mean -   Quality of Life Subscale Score -              Answers for HPI/ROS submitted by the patient on 2/10/2020   General Symptoms: No  Skin Symptoms: Yes  HENT Symptoms: No  EYE SYMPTOMS: No  HEART SYMPTOMS: Yes  LUNG SYMPTOMS: No  INTESTINAL SYMPTOMS: No  URINARY SYMPTOMS: No  GYNECOLOGIC SYMPTOMS: No  BREAST SYMPTOMS: No  SKELETAL SYMPTOMS: Yes  BLOOD SYMPTOMS: No  NERVOUS SYSTEM SYMPTOMS: No  MENTAL HEALTH SYMPTOMS: No  Changes in hair: No  Changes in moles/birth marks: No  Rashes: Yes  Changes in nails: No  Acne: No  Hair in places you don't want it: No  Change in facial hair: No  Warts: No  Non-healing sores: No  Scarring: No  Flaking of skin: No  Color changes of hands/feet in cold : No  Sun sensitivity: No  Skin thickening: No  Chest pain or pressure: No  Fast or irregular heartbeat: Yes  Pain in legs with walking: No  Trouble breathing while lying down: No  Fingers or toes appear blue: No  High blood pressure: Yes  Low blood pressure: No  Fainting: No  Murmurs: No  Pacemaker: No  Varicose veins: No  Edema or swelling: Yes  Wake up at night with shortness of breath: No  Light-headedness: No  Exercise intolerance: No  Back pain: No  Muscle aches: No  Neck pain: No  Swollen joints: No  Joint pain: Yes  Bone pain: No  Muscle cramps: No  Muscle weakness: Yes  Joint stiffness: No  Bone fracture: No

## 2020-02-14 ENCOUNTER — THERAPY VISIT (OUTPATIENT)
Dept: PHYSICAL THERAPY | Facility: CLINIC | Age: 55
End: 2020-02-14
Payer: COMMERCIAL

## 2020-02-14 DIAGNOSIS — M25.551 HIP PAIN, RIGHT: ICD-10-CM

## 2020-02-14 PROCEDURE — 97110 THERAPEUTIC EXERCISES: CPT | Mod: GP | Performed by: PHYSICAL THERAPIST

## 2020-02-14 PROCEDURE — 97112 NEUROMUSCULAR REEDUCATION: CPT | Mod: GP | Performed by: PHYSICAL THERAPIST

## 2020-02-23 ENCOUNTER — HEALTH MAINTENANCE LETTER (OUTPATIENT)
Age: 55
End: 2020-02-23

## 2020-02-28 ENCOUNTER — THERAPY VISIT (OUTPATIENT)
Dept: PHYSICAL THERAPY | Facility: CLINIC | Age: 55
End: 2020-02-28
Payer: COMMERCIAL

## 2020-02-28 DIAGNOSIS — M25.551 HIP PAIN, RIGHT: ICD-10-CM

## 2020-02-28 PROCEDURE — 97110 THERAPEUTIC EXERCISES: CPT | Mod: GP | Performed by: PHYSICAL THERAPIST

## 2020-02-28 PROCEDURE — 97112 NEUROMUSCULAR REEDUCATION: CPT | Mod: GP | Performed by: PHYSICAL THERAPIST

## 2020-04-22 PROBLEM — M25.551 HIP PAIN, RIGHT: Status: RESOLVED | Noted: 2020-01-27 | Resolved: 2020-04-22

## 2020-09-23 ENCOUNTER — TELEPHONE (OUTPATIENT)
Dept: ORTHOPEDICS | Facility: CLINIC | Age: 55
End: 2020-09-23

## 2020-09-23 DIAGNOSIS — Z98.890 STATUS POST HIP SURGERY: Primary | ICD-10-CM

## 2020-09-23 RX ORDER — AMOXICILLIN 500 MG/1
TABLET, FILM COATED ORAL
Qty: 4 TABLET | Refills: 0 | Status: SHIPPED | OUTPATIENT
Start: 2020-09-23 | End: 2020-11-05

## 2020-09-23 NOTE — TELEPHONE ENCOUNTER
[facility] Call Center    Phone Message    May a detailed message be left on voicemail: yes     Reason for Call: Medication Refill Request    Has the patient contacted the pharmacy for the refill? Yes   Name of medication being requested: Antibiotics  Provider who prescribed the medication: Dr. Schuster  Pharmacy: [facility]  Date medication is needed: Today     Pt called in and needs the antibiotics today, she needs to make a dental appt for her tooth pain.  Please call pt when this has been done so she can make the dental appt.  Pt also would like to know how soon does she need to take this antibiotic before seeing the dentist?       Action Taken: Message routed to:  [facility] Ortho    Travel Screening: Not Applicable

## 2020-09-30 ENCOUNTER — TELEPHONE (OUTPATIENT)
Dept: ORTHOPEDICS | Facility: CLINIC | Age: 55
End: 2020-09-30

## 2020-09-30 NOTE — TELEPHONE ENCOUNTER
RN called patient. Per patient, her dentist prescribed her 7 days worth of amoxicillin and patient wants to know if she should still take another 4 tabs of amoxillin prior to her root canal. RN told patient in this case, she doesn't need additional Abx. RN did however stress to patient that if in the future she has more dental procedures, she will need to contact RN for ABX refill. Patient expressed understanding.    Russell Jackson RN    [facility] Call Center    Phone Message    May a detailed message be left on voicemail: yes     Reason for Call: Other: Pt is going to dental appointment and needs antibiotic. Pt would like to speak to the care team and will not be reaching out to pharmacy until speaking to care team.    Dental appointment is on 10/2 Friday. Would like the medication picked by Thursday.  Action Taken: Message routed to:  [facility] ortho    Travel Screening: Not Applicable

## 2020-11-05 ENCOUNTER — TELEPHONE (OUTPATIENT)
Dept: ORTHOPEDICS | Facility: CLINIC | Age: 55
End: 2020-11-05

## 2020-11-05 DIAGNOSIS — Z98.890 STATUS POST HIP SURGERY: ICD-10-CM

## 2020-11-05 RX ORDER — AMOXICILLIN 500 MG/1
TABLET, FILM COATED ORAL
Qty: 4 TABLET | Refills: 3 | Status: SHIPPED | OUTPATIENT
Start: 2020-11-05 | End: 2023-06-02

## 2020-11-05 NOTE — TELEPHONE ENCOUNTER
[facility] Call Center    Phone Message    May a detailed message be left on voicemail: yes     Reason for Call: Other: Pt of Dr. Schuster's calling in to schedule her anual bilat ALE's.  I did get her scheduled for 12/03/20 but Pt wanted to me to let Dr. Schuster's team know that her Left hip has been hurting a lot and she like a call to discuss as what she can do until 12/03/20     Action Taken: Message routed to:  [facility] Ortho    Travel Screening: Not Applicable

## 2020-11-05 NOTE — TELEPHONE ENCOUNTER
Called and talked with pt. Encouraged her to stretch and continue doing what she is currently doing. States she is getting better.     Wants an antibiotic refill.     Will Send to RN for refill    Jasmin

## 2020-11-09 ENCOUNTER — DOCUMENTATION ONLY (OUTPATIENT)
Dept: CARE COORDINATION | Facility: CLINIC | Age: 55
End: 2020-11-09

## 2020-12-01 DIAGNOSIS — Z96.642 STATUS POST LEFT HIP REPLACEMENT: Primary | ICD-10-CM

## 2020-12-01 DIAGNOSIS — Z96.641 STATUS POST RIGHT HIP REPLACEMENT: ICD-10-CM

## 2020-12-03 ENCOUNTER — OFFICE VISIT (OUTPATIENT)
Dept: ORTHOPEDICS | Facility: CLINIC | Age: 55
End: 2020-12-03
Payer: COMMERCIAL

## 2020-12-03 ENCOUNTER — ANCILLARY PROCEDURE (OUTPATIENT)
Dept: GENERAL RADIOLOGY | Facility: CLINIC | Age: 55
End: 2020-12-03
Attending: ORTHOPAEDIC SURGERY
Payer: COMMERCIAL

## 2020-12-03 VITALS — HEIGHT: 67 IN | WEIGHT: 252.7 LBS | BODY MASS INDEX: 39.66 KG/M2

## 2020-12-03 DIAGNOSIS — Z96.641 STATUS POST RIGHT HIP REPLACEMENT: ICD-10-CM

## 2020-12-03 DIAGNOSIS — Z96.642 STATUS POST LEFT HIP REPLACEMENT: ICD-10-CM

## 2020-12-03 DIAGNOSIS — Z96.641 HISTORY OF RIGHT HIP REPLACEMENT: Primary | ICD-10-CM

## 2020-12-03 PROBLEM — M16.10 PRIMARY LOCALIZED OSTEOARTHRITIS OF PELVIC REGION AND THIGH: Status: ACTIVE | Noted: 2020-12-03

## 2020-12-03 PROBLEM — I49.9 IRREGULAR HEART BEAT: Status: ACTIVE | Noted: 2020-12-03

## 2020-12-03 PROBLEM — G56.00 CARPAL TUNNEL SYNDROME: Status: ACTIVE | Noted: 2020-12-03

## 2020-12-03 PROBLEM — Z80.3 FAMILY HISTORY OF BREAST CANCER: Status: ACTIVE | Noted: 2020-12-03

## 2020-12-03 PROBLEM — Z72.0 TOBACCO USE: Status: ACTIVE | Noted: 2020-12-03

## 2020-12-03 PROCEDURE — 73522 X-RAY EXAM HIPS BI 3-4 VIEWS: CPT | Mod: GC | Performed by: RADIOLOGY

## 2020-12-03 PROCEDURE — 99213 OFFICE O/P EST LOW 20 MIN: CPT | Mod: GC | Performed by: ORTHOPAEDIC SURGERY

## 2020-12-03 RX ORDER — TRIAMCINOLONE ACETONIDE 1 MG/G
CREAM TOPICAL
COMMUNITY
Start: 2020-02-19 | End: 2023-06-02

## 2020-12-03 ASSESSMENT — MIFFLIN-ST. JEOR: SCORE: 1768.87

## 2020-12-03 NOTE — PROGRESS NOTES
[facility]  Orthopaedic Surgery, Joint Replacement Consultation  by Rashid Schuster M.D.    Licha Birch MRN# [id number]   Age: 54 year old YOB: 1965     Requesting physician: Referred Self  Joseluis Gross     Background history:  DX:  1. Bilateral hip OA     TREATMENTS:  1. 1/2019, 7/2019 - US guided corticosteroid injection right hip (Dr. Jay)  2. 10/25/2019 - Total left hip arthroplasty, Dr. Schuster, [facility]  3. 1/17/2020 - Total right hip arthroplasty, Dr. Schuster, [facility]    Subjective:    Ms. Birch is a 55-year-old female who presents to clinic today for evaluation of pain in bilateral hips status post bilateral total hip arthroplasty.  Patient reports that this summer she was doing quite well with minimal pain in both of her hips.  However, in mid October, she had an increase in her activity level and noted acute onset of worsening pain in her bilateral hips.  It began with mostly pain in her left hip and groin area, but slowly shifted over to bilateral buttock pain and right hip pain over the outer aspect of her hip.  The pain radiates down the outside of her leg that does not go past her knee.  She denies numbness or tingling.  She states the pain is worse with activity and with certain positions, but this is variable.  Does not wake her up at night.    The patient reports recently having a root canal performed.  She took her antibiotics as prescribed.  She denies fevers or chills.  She denies any issues with her incision including increased swelling or drainage.  No redness.  Overall otherwise, feels well.    Past medical history, social history, and review of systems were reviewed without significant change.    Objective:  General: No acute distress.  Calm and cooperative with examination.  Respiratory: The patient has nonlabored breathing on room air.  Cardiac: The patient's extremities are warm well perfused.  Musculoskeletal: Focused exam of bilateral hip shows  well-healed surgical incisions.  Range of motion of the right hip shows flexion to 100 degrees, external rotation 40 degrees, internal rotation to 10 degrees.  There is no significant increase in pain.  Range of motion of the left hip is 100 degrees of flexion, 15 degrees of internal rotation, 45 degrees of external rotation, and no significant increase in pain with motion.  There is no focal swelling about either hip.  Distally, sensation is intact to light touch in the saphenous, sural, superficial peroneal, deep peroneal, and tibial nerve distributions.  The  feet are warm and well-perfused.  She fires tibialis anterior, gastrocsoleus complex, EHL, and FHL 5 out of 5 strength.  She has 1 beat of clonus bilaterally.    Assessment/Plan:    Ms. Birch is a 55 year-old female who is 11 months s/p right ALE and 1 year s/p left ALE who presents for follow-up today.      Her pain pattern and severity of her pain are not consistent with any problems with her implant or surgery.  Low concern for infection, X-rays show no implant loosening or subsidence.  It is more likely a muscle/soft tissue stiffness/strain that is leading to her symptoms. No signs of greater trochanteric bursitis today.    Plan:  - Continue conservative management.  PT exercises at home, NSAIDs/OTC pain control, activity modifications  - Follow-up in 5 years with X-rays for routine ALE follow-up  - Return sooner if there is worsening pain, any swelling, or concerns regarding her hips. If pain pattern changes or is more consistent, would consider further workup for infection/loosening.    Total time spent with the patient during this visit was 15 minutes, with >50% of the time spent for counseling andd coordination of care.    The patient was seen and discussed with Dr. Schuster, who is in agreement with plan.    Nick Paz MD  Orthopaedic Surgery PGY-4  #: [phone]      Attending MD (Dr. Rashid Schuster) Attestation :  This patient was seen and  evaluated by me including a history, exam, and interpretation of all imaging and/or lab data.  Either a training physician (resident/fellow), who also saw the patient, or scribe has documented the visit in the attached note.    MD Rell Saavedra Family Professor  Oncology and Adult Reconstructive Surgery  Dept Orthopaedic Surgery, [facility]  [phone] office, [phone] pager  [email or web address]

## 2020-12-03 NOTE — NURSING NOTE
"Chief Complaint   Patient presents with     Surgical Followup     Pt. states that she is here today for Right ALE Pain. She states that she had Bilat. Hip Pain in October, but now the Right is greater than the Left. Left ALE: 10/25/2019 and Right ALE: 01/17/2020       55 year old  1965    Ht 1.702 m (5' 7\")   Wt 114.6 kg (252 lb 11.2 oz)   BMI 39.58 kg/m        [id number] - [place], [place] - [address] AT [facility] [address]    No Known Allergies    Current Outpatient Medications   Medication     acetaminophen (TYLENOL) 325 MG tablet     amLODIPine (NORVASC) 5 MG tablet     amoxicillin (AMOXIL) 500 MG tablet     glucosamine 500 MG CAPS     ibuprofen (ADVIL/MOTRIN) 200 MG capsule     lisinopril-hydrochlorothiazide (PRINZIDE/ZESTORETIC) 20-12.5 MG tablet     NEW MED     triamcinolone (KENALOG) 0.1 % external cream     No current facility-administered medications for this visit.                  "

## 2020-12-06 ENCOUNTER — HEALTH MAINTENANCE LETTER (OUTPATIENT)
Age: 55
End: 2020-12-06

## 2021-03-15 ENCOUNTER — TELEPHONE (OUTPATIENT)
Dept: ORTHOPEDICS | Facility: CLINIC | Age: 56
End: 2021-03-15

## 2021-03-15 NOTE — TELEPHONE ENCOUNTER
"Licha called asking for the name of the sports medicine doctor she saw in 2019, information provided.  Also provided scheduling number.    Licha will call and set up an appointment. The \"pinched nerve sensation\" she is feeling in her tail bone/buttocks area, Licha does not feel that it is related to the surgical procedure done by Dr. Schuster.  This is a new sensation, she wants to see sports medicine first.    Licha appreciated the help. Will follow up with this RN for any further questions/assistance.    CIRO SanchezN, RN  RN Care Coordinator, Dr. Schuster  [facility]      "

## 2021-03-19 NOTE — TELEPHONE ENCOUNTER
DIAGNOSIS: Back and Buttock, onset 2 weeks; self-referred   APPOINTMENT DATE: 3/24/2021   NOTES STATUS DETAILS   OFFICE NOTE from referring provider N/A    OFFICE NOTE from other specialist [facility]:  12/3/20 - ORTHO OV with Dr. Schuster  1/27/20 to 2/14/20 - PT OVs with Edy Barillas, PT  7/10/19, 1/9/19 - Injections with Dr. Jay   DISCHARGE SUMMARY from hospital N/A    DISCHARGE REPORT from the ER N/A    OPERATIVE REPORT [facility]:  1/17/20 - OP Note for RIGHT TOTAL HIP ARTHROPLASTY with Dr. Schuster  10/25/19 - OP Note for TOTAL LEFT HIP ARTHROPLASTY with Dr. Schuster   EMG report N/A    MEDICATION LIST Internal    MRI [facility]:  1/11/19 - MRI Hip, Left   DEXA (osteoporosis/bone health) N/A    CT SCAN N/A    XRAYS (IMAGES & REPORTS) [facility]:   12/3/20 - XR Pelvis/Hip, Bilateral  2/10/20 - XR Pelvis/Hip, Right  1/17/20 - XR Pelvis/Hip, Right  11/25/19 - XR Pelvis/Hip, Left  10/25/19 - XR Pelvis/Hip, Left  8/15/19 - XR Pelvis/Hip, Bilateral  1/2/19 - XR Lumbar Spine  1/2/19 - XR Pelvis/Hip, Right

## 2021-03-24 ENCOUNTER — PRE VISIT (OUTPATIENT)
Dept: ORTHOPEDICS | Facility: CLINIC | Age: 56
End: 2021-03-24

## 2021-03-30 NOTE — TELEPHONE ENCOUNTER
DIAGNOSIS: Back and Buttock, onset 2 weeks; self-referred   APPOINTMENT DATE: 4/23/2021   NOTES STATUS DETAILS   OFFICE NOTE from referring provider N/A    OFFICE NOTE from other specialist [facility]:  12/3/20 - ORTHO OV with Dr. Schuster  1/27/20 to 2/14/20 - PT OVs with Edy Barillas, PT  7/10/19, 1/9/19 - Injections with Dr. Jay   DISCHARGE SUMMARY from hospital N/A    DISCHARGE REPORT from the ER N/A    OPERATIVE REPORT [facility]:  1/17/20 - OP Note for RIGHT TOTAL HIP ARTHROPLASTY with Dr. Schuster  10/25/19 - OP Note for TOTAL LEFT HIP ARTHROPLASTY with Dr. Schuster   EMG report N/A    MEDICATION LIST Internal    MRI [facility]:  1/11/19 - MRI Hip, Left   DEXA (osteoporosis/bone health) N/A    CT SCAN N/A    XRAYS (IMAGES & REPORTS) [facility]:   12/3/20 - XR Pelvis/Hip, Bilateral  2/10/20 - XR Pelvis/Hip, Right  1/17/20 - XR Pelvis/Hip, Right  11/25/19 - XR Pelvis/Hip, Left  10/25/19 - XR Pelvis/Hip, Left  8/15/19 - XR Pelvis/Hip, Bilateral  1/2/19 - XR Lumbar Spine  1/2/19 - XR Pelvis/Hip, Right

## 2021-04-05 ENCOUNTER — RECORDS - HEALTHEAST (OUTPATIENT)
Dept: LAB | Facility: CLINIC | Age: 56
End: 2021-04-05

## 2021-04-05 LAB
ANION GAP SERPL CALCULATED.3IONS-SCNC: 12 MMOL/L (ref 5–18)
BUN SERPL-MCNC: 14 MG/DL (ref 8–22)
CALCIUM SERPL-MCNC: 9.5 MG/DL (ref 8.5–10.5)
CHLORIDE BLD-SCNC: 104 MMOL/L (ref 98–107)
CHOLEST SERPL-MCNC: 190 MG/DL
CO2 SERPL-SCNC: 21 MMOL/L (ref 22–31)
CREAT SERPL-MCNC: 0.86 MG/DL (ref 0.6–1.1)
FASTING STATUS PATIENT QL REPORTED: ABNORMAL
GFR SERPL CREATININE-BSD FRML MDRD: >60 ML/MIN/1.73M2
GLUCOSE BLD-MCNC: 140 MG/DL (ref 70–125)
HDLC SERPL-MCNC: 39 MG/DL
LDLC SERPL CALC-MCNC: 121 MG/DL
MAGNESIUM SERPL-MCNC: 1.7 MG/DL (ref 1.8–2.6)
POTASSIUM BLD-SCNC: 4.2 MMOL/L (ref 3.5–5)
SODIUM SERPL-SCNC: 137 MMOL/L (ref 136–145)
TRIGL SERPL-MCNC: 148 MG/DL

## 2021-04-23 ENCOUNTER — PRE VISIT (OUTPATIENT)
Dept: ORTHOPEDICS | Facility: CLINIC | Age: 56
End: 2021-04-23

## 2021-06-28 ENCOUNTER — RECORDS - HEALTHEAST (OUTPATIENT)
Dept: LAB | Facility: CLINIC | Age: 56
End: 2021-06-28

## 2021-06-28 LAB
ALBUMIN SERPL-MCNC: 4 G/DL (ref 3.5–5)
ALP SERPL-CCNC: 110 U/L (ref 45–120)
ALT SERPL W P-5'-P-CCNC: 47 U/L (ref 0–45)
AMYLASE SERPL-CCNC: 46 U/L (ref 5–120)
ANION GAP SERPL CALCULATED.3IONS-SCNC: 12 MMOL/L (ref 5–18)
AST SERPL W P-5'-P-CCNC: 29 U/L (ref 0–40)
BILIRUB SERPL-MCNC: 0.5 MG/DL (ref 0–1)
BUN SERPL-MCNC: 15 MG/DL (ref 8–22)
CALCIUM SERPL-MCNC: 9.6 MG/DL (ref 8.5–10.5)
CHLORIDE BLD-SCNC: 102 MMOL/L (ref 98–107)
CO2 SERPL-SCNC: 24 MMOL/L (ref 22–31)
CREAT SERPL-MCNC: 0.88 MG/DL (ref 0.6–1.1)
GFR SERPL CREATININE-BSD FRML MDRD: >60 ML/MIN/1.73M2
GLUCOSE BLD-MCNC: 108 MG/DL (ref 70–125)
LIPASE SERPL-CCNC: 33 U/L (ref 0–52)
POTASSIUM BLD-SCNC: 4.5 MMOL/L (ref 3.5–5)
PROT SERPL-MCNC: 7 G/DL (ref 6–8)
SODIUM SERPL-SCNC: 138 MMOL/L (ref 136–145)

## 2021-09-26 ENCOUNTER — HEALTH MAINTENANCE LETTER (OUTPATIENT)
Age: 56
End: 2021-09-26

## 2021-11-21 ENCOUNTER — HEALTH MAINTENANCE LETTER (OUTPATIENT)
Age: 56
End: 2021-11-21

## 2022-01-16 ENCOUNTER — HEALTH MAINTENANCE LETTER (OUTPATIENT)
Age: 57
End: 2022-01-16

## 2022-09-26 ENCOUNTER — LAB REQUISITION (OUTPATIENT)
Dept: LAB | Facility: CLINIC | Age: 57
End: 2022-09-26
Payer: COMMERCIAL

## 2022-09-26 DIAGNOSIS — I10 ESSENTIAL (PRIMARY) HYPERTENSION: ICD-10-CM

## 2022-09-26 DIAGNOSIS — Z13.6 ENCOUNTER FOR SCREENING FOR CARDIOVASCULAR DISORDERS: ICD-10-CM

## 2022-09-26 LAB
ALBUMIN SERPL BCG-MCNC: 4.2 G/DL (ref 3.5–5.2)
ALP SERPL-CCNC: 106 U/L (ref 35–129)
ALT SERPL W P-5'-P-CCNC: 37 U/L (ref 10–50)
ANION GAP SERPL CALCULATED.3IONS-SCNC: 10 MMOL/L (ref 7–15)
AST SERPL W P-5'-P-CCNC: 23 U/L (ref 10–50)
BILIRUB SERPL-MCNC: 0.3 MG/DL
BUN SERPL-MCNC: 14.5 MG/DL (ref 6–20)
CALCIUM SERPL-MCNC: 9.4 MG/DL (ref 8.6–10)
CHLORIDE SERPL-SCNC: 100 MMOL/L (ref 98–107)
CHOLEST SERPL-MCNC: 196 MG/DL
CREAT SERPL-MCNC: 0.94 MG/DL (ref 0.51–1.17)
DEPRECATED HCO3 PLAS-SCNC: 26 MMOL/L (ref 22–29)
GFR SERPL CREATININE-BSD FRML MDRD: 70 ML/MIN/1.73M2
GLUCOSE SERPL-MCNC: 107 MG/DL (ref 70–99)
HDLC SERPL-MCNC: 43 MG/DL
LDLC SERPL CALC-MCNC: 129 MG/DL
NONHDLC SERPL-MCNC: 153 MG/DL
POTASSIUM SERPL-SCNC: 4.2 MMOL/L (ref 3.4–5.3)
PROT SERPL-MCNC: 6.8 G/DL (ref 6.4–8.3)
SODIUM SERPL-SCNC: 136 MMOL/L (ref 136–145)
TRIGL SERPL-MCNC: 118 MG/DL

## 2022-09-26 PROCEDURE — 80053 COMPREHEN METABOLIC PANEL: CPT | Mod: ORL | Performed by: PHYSICIAN ASSISTANT

## 2022-09-26 PROCEDURE — 80061 LIPID PANEL: CPT | Mod: ORL | Performed by: PHYSICIAN ASSISTANT

## 2022-10-25 NOTE — OR NURSING
Report given to Billie BYERS   Mauc Instructions: By selecting yes to the question below the MAUC number will be added into the note.  This will be calculated automatically based on the diagnosis chosen, the size entered, the body zone selected (H,M,L) and the specific indications you chose. You will also have the option to override the Mohs AUC if you disagree with the automatically calculated number and this option is found in the Case Summary tab.

## 2023-04-23 ENCOUNTER — HEALTH MAINTENANCE LETTER (OUTPATIENT)
Age: 58
End: 2023-04-23

## 2023-06-02 ENCOUNTER — OFFICE VISIT (OUTPATIENT)
Dept: FAMILY MEDICINE | Facility: CLINIC | Age: 58
End: 2023-06-02
Payer: COMMERCIAL

## 2023-06-02 ENCOUNTER — LAB (OUTPATIENT)
Dept: FAMILY MEDICINE | Facility: CLINIC | Age: 58
End: 2023-06-02

## 2023-06-02 VITALS
OXYGEN SATURATION: 95 % | TEMPERATURE: 98.1 F | SYSTOLIC BLOOD PRESSURE: 117 MMHG | HEIGHT: 67 IN | HEART RATE: 86 BPM | DIASTOLIC BLOOD PRESSURE: 78 MMHG | RESPIRATION RATE: 16 BRPM | WEIGHT: 256.1 LBS | BODY MASS INDEX: 40.19 KG/M2

## 2023-06-02 DIAGNOSIS — Z12.11 SCREEN FOR COLON CANCER: ICD-10-CM

## 2023-06-02 DIAGNOSIS — I10 ESSENTIAL HYPERTENSION: ICD-10-CM

## 2023-06-02 DIAGNOSIS — M16.0 PRIMARY OSTEOARTHRITIS OF BOTH HIPS: ICD-10-CM

## 2023-06-02 DIAGNOSIS — Z00.00 ROUTINE GENERAL MEDICAL EXAMINATION AT A HEALTH CARE FACILITY: Primary | ICD-10-CM

## 2023-06-02 PROCEDURE — 99386 PREV VISIT NEW AGE 40-64: CPT | Performed by: FAMILY MEDICINE

## 2023-06-02 PROCEDURE — 99214 OFFICE O/P EST MOD 30 MIN: CPT | Mod: 25 | Performed by: FAMILY MEDICINE

## 2023-06-02 RX ORDER — LISINOPRIL AND HYDROCHLOROTHIAZIDE 12.5; 2 MG/1; MG/1
1 TABLET ORAL DAILY
Qty: 90 TABLET | Refills: 1 | Status: SHIPPED | OUTPATIENT
Start: 2023-06-02 | End: 2024-02-07

## 2023-06-02 RX ORDER — CALCIUM CARBONATE/VITAMIN D3 500-10/5ML
LIQUID (ML) ORAL
COMMUNITY
End: 2024-02-07

## 2023-06-02 RX ORDER — AMLODIPINE BESYLATE 5 MG/1
5 TABLET ORAL DAILY
Qty: 90 TABLET | Refills: 1 | Status: SHIPPED | OUTPATIENT
Start: 2023-06-02 | End: 2023-12-27

## 2023-06-02 ASSESSMENT — ENCOUNTER SYMPTOMS
BREAST MASS: 0
SORE THROAT: 0
WEAKNESS: 0
ARTHRALGIAS: 1
MYALGIAS: 0
FEVER: 0
CONSTIPATION: 0
PARESTHESIAS: 0
JOINT SWELLING: 0
DIARRHEA: 0
HEADACHES: 0
COUGH: 0
EYE PAIN: 0
DIZZINESS: 0
HEARTBURN: 0
DYSURIA: 0
HEMATOCHEZIA: 0
FREQUENCY: 0
HEMATURIA: 0
NAUSEA: 0
ABDOMINAL PAIN: 1
PALPITATIONS: 0
SHORTNESS OF BREATH: 0
NERVOUS/ANXIOUS: 0
CHILLS: 0

## 2023-06-02 ASSESSMENT — PAIN SCALES - GENERAL: PAINLEVEL: MODERATE PAIN (4)

## 2023-06-02 NOTE — PROGRESS NOTES
SUBJECTIVE:   CC: Licha is an 57 year old who presents for preventive health visit.        View : No data to display.              Healthy Habits:     Getting at least 3 servings of Calcium per day:  Yes    Bi-annual eye exam:  Yes    Dental care twice a year:  Yes    Sleep apnea or symptoms of sleep apnea:  None    Diet:  Regular (no restrictions)    Frequency of exercise:  2-3 days/week    Duration of exercise:  15-30 minutes    Taking medications regularly:  Yes    Medication side effects:  None    PHQ-2 Total Score: 0    Additional concerns today:  Yes    History of hip replacement (bilateral)  -Left one, pain started to come back  -Occurs in the groin and outside the hip  -Intermittent, mostly in bed at night to roll over or get into the car.  -Surgery was Oct 2019  -No swelling, warmth, redness, no numbness/tingling, no trauma.   -Was not able to finish going through physical therapy, but did do the exercises.     Carpal Tunnel   -Right sided  -Worse in the AM  -Symptoms in the first two fingers  -Numbness and tingling  -No  loss  -Patient does have a brace  -Patient does not want to hand surgery    Have you ever done Advance Care Planning? (For example, a Health Directive, POLST, or a discussion with a medical provider or your loved ones about your wishes): No, advance care planning information given to patient to review.  Advanced care planning was discussed at today's visit.    Social History     Tobacco Use     Smoking status: Former     Packs/day: 0.50     Years: 35.00     Pack years: 17.50     Types: Cigarettes     Quit date: 10/1/2019     Years since quitting: 3.6     Smokeless tobacco: Never     Tobacco comments:     Has smoked on and off over the 35 years    Vaping Use     Vaping status: Not on file   Substance Use Topics     Alcohol use: Yes     Comment: Socially         6/2/2023     7:42 AM   Alcohol Use   Prescreen: >3 drinks/day or >7 drinks/week? No          View : No data to display.               Reviewed orders with patient.  Reviewed health maintenance and updated orders accordingly - Yes  Labs reviewed in EPIC    Breast Cancer Screening:  Any new diagnosis of family breast, ovarian, or bowel cancer? No    FHS-7:       6/2/2023     7:44 AM   Breast CA Risk Assessment (FHS-7)   Did any of your first-degree relatives have breast or ovarian cancer? Yes   Did any of your relatives have bilateral breast cancer? Unknown   Did any man in your family have breast cancer? No   Did any woman in your family have breast and ovarian cancer? Yes   Did any woman in your family have breast cancer before age 50 y? No   Do you have 2 or more relatives with breast and/or ovarian cancer? Yes   Do you have 2 or more relatives with breast and/or bowel cancer? Unknown     Mammogram Screening: Recommended mammography every 1-2 years with patient discussion and risk factor consideration  Pertinent mammograms are reviewed under the imaging tab. Patient not interested in genetic counseling.     History of abnormal Pap smear: NO - age 30-65 PAP every 5 years with negative HPV co-testing recommended     Reviewed and updated as needed this visit by clinical staff   Tobacco  Allergies  Meds  Problems  Med Hx  Surg Hx  Fam Hx          Reviewed and updated as needed this visit by Provider   Tobacco  Allergies  Meds  Problems  Med Hx  Surg Hx  Fam Hx           Review of Systems   Constitutional: Negative for chills and fever.   HENT: Negative for congestion, ear pain, hearing loss and sore throat.    Eyes: Negative for pain and visual disturbance.   Respiratory: Negative for cough and shortness of breath.    Cardiovascular: Negative for chest pain and palpitations.   Gastrointestinal: Positive for abdominal pain. Negative for constipation, diarrhea and nausea.   Genitourinary: Negative for dysuria, frequency, genital sores, hematuria and urgency.   Musculoskeletal: Positive for arthralgias. Negative for joint swelling  "and myalgias.   Skin: Negative for rash.   Neurological: Negative for dizziness, weakness and headaches.   Psychiatric/Behavioral: The patient is not nervous/anxious.       OBJECTIVE:   /78 (BP Location: Right arm, Patient Position: Sitting, Cuff Size: Adult Large)   Pulse 86   Temp 98.1  F (36.7  C) (Oral)   Resp 16   Ht 1.702 m (5' 7\")   Wt 116.2 kg (256 lb 1.6 oz)   LMP  (LMP Unknown)   SpO2 95%   BMI 40.11 kg/m    Physical Exam  GENERAL: healthy, alert and no distress  EYES: Eyes grossly normal to inspection, PERRL and conjunctivae and sclerae normal  HENT: ear canals and TM's normal, nose and mouth without ulcers or lesions  NECK: no adenopathy, no asymmetry, masses, or scars and thyroid normal to palpation  RESP: lungs clear to auscultation - no rales, rhonchi or wheezes  CV: regular rate and rhythm, normal S1 S2, no S3 or S4, no murmur, click or rub, no peripheral edema and peripheral pulses strong  ABDOMEN: soft, nontender, no hepatosplenomegaly, no masses and bowel sounds normal. Deep palpable nodule. Immobile, non tender.   MS: no gross musculoskeletal defects noted, no edema  SKIN: no suspicious lesions or rashes  PSYCH: mentation appears normal, affect normal/bright    Diagnostic Test Results:  none     ASSESSMENT/PLAN:   Licha was seen today for physical, musculoskeletal problem and pain.    Diagnoses and all orders for this visit:    Routine general medical examination at a health care facility  Patient reports up to date cervical cancer screening, breast cancer screening from hospitals. Pending record import, if unable to locate, will recommend repeat studies.   -     REVIEW OF HEALTH MAINTENANCE PROTOCOL ORDERS    Essential hypertension  Chronic, controlled. Labs up to date. Will be do for urine micro albumin next time she is here.   -     lisinopril-hydrochlorothiazide (ZESTORETIC) 20-12.5 MG tablet; Take 1 tablet by mouth daily  -     amLODIPine (NORVASC) 5 MG tablet; Take 1 tablet (5 mg) " by mouth daily  -     PRIMARY CARE FOLLOW-UP SCHEDULING; Future    Primary osteoarthritis of both hips  Chronic, worsened. Unclear if patient needing revision versus long term pain. Discussed use of topical NSAID, and have patient follow up with former orthopedic surgeon.   -     diclofenac (VOLTAREN) 1 % topical gel; Apply 4 g topically 4 times daily Apply to left hip  -     Orthopedic  Referral; Future    Screen for colon cancer  -     COLOGUARD([employer]); Future    Other orders  -     REVIEW OF HEALTH MAINTENANCE PROTOCOL ORDERS        Patient has been advised of split billing requirements and indicates understanding: Yes      COUNSELING:  Reviewed preventive health counseling, as reflected in patient instructions        Licha Birch reports that she quit smoking about 3 years ago. Her smoking use included cigarettes. She has a 17.50 pack-year smoking history. She has never used smokeless tobacco.      Jade Ferrera [facility]  Answers for HPI/ROS submitted by the patient on 6/2/2023  Blood in stool: No  heartburn: No  peripheral edema: No  mood changes: No  Skin sensation changes: No  pelvic pain: No  vaginal bleeding: No  vaginal discharge: No  tenderness: No  breast mass: No  breast discharge: No

## 2023-06-05 ENCOUNTER — TELEPHONE (OUTPATIENT)
Dept: FAMILY MEDICINE | Facility: CLINIC | Age: 58
End: 2023-06-05
Payer: COMMERCIAL

## 2023-06-05 NOTE — TELEPHONE ENCOUNTER
Please start prior authorization for diclofenac sodium 1%    Thank you,  Joseluis Tang Jr., CMA on 6/5/2023 at 1:05 PM

## 2023-06-08 NOTE — TELEPHONE ENCOUNTER
Prior Authorization Approval    Medication: DICLOFENAC SODIUM 1 % EX GEL  Authorization Effective Date: 5/9/2023  Authorization Expiration Date: 6/7/2024  Approved Dose/Quantity: 300/18  Reference #: [id number]   Insurance Company: [employer] - Phone [phone] Fax [phone]  Expected CoPay:       CoPay Card Available:      Financial Assistance Needed:  Which Pharmacy is filling the prescription: [id number] - [place], [place] - [address] [facility]  Pharmacy Notified: Yes  Patient Notified: Yes

## 2023-06-28 ENCOUNTER — TELEPHONE (OUTPATIENT)
Dept: FAMILY MEDICINE | Facility: CLINIC | Age: 58
End: 2023-06-28
Payer: COMMERCIAL

## 2023-06-28 ENCOUNTER — MYC MEDICAL ADVICE (OUTPATIENT)
Dept: FAMILY MEDICINE | Facility: CLINIC | Age: 58
End: 2023-06-28
Payer: COMMERCIAL

## 2023-06-28 DIAGNOSIS — I10 ESSENTIAL HYPERTENSION: Primary | ICD-10-CM

## 2023-06-28 NOTE — TELEPHONE ENCOUNTER
"Patient calling to report that her recent Rx for her lisinopril-hydrochlorothiazide (ZESTORETIC) 20-12.5 MG tablet is incorrect.  It should read \"Take 2 tablets by mouth daily\".  It was sent to pharmacy as take 1 daily, and now patient is going to be short medication as only 90 tablets were sent.  Please correct rx to read \"take 2\" and increase dispense amount to 180 per patient request.    Any questions, patient can be reached at [phone].  "

## 2023-06-30 RX ORDER — LISINOPRIL AND HYDROCHLOROTHIAZIDE 12.5; 2 MG/1; MG/1
1 TABLET ORAL 2 TIMES DAILY
Qty: 180 TABLET | Refills: 1 | Status: SHIPPED | OUTPATIENT
Start: 2023-06-30 | End: 2024-02-07

## 2023-06-30 NOTE — PROGRESS NOTES
MEDICAL DETOX UNIT, LEVEL 4  Department of Medical Toxicology  Reason for Admission/Principal Problem: alcohol withdrawal   Admitting provider: George Nicole PA-C  No att  providers found   6/28/2023  2:16 PM       Discharging Physician / Practitioner: George Nicole PA-C  PCP: Jc Hoffman MD  Admission Date:   Admission Orders (From admission, onward)     Ordered        06/28/23 1631  INPATIENT ADMISSION  Once                      Discharge Date: 06/30/23    Medical Problems     Resolved Problems  Date Reviewed: 6/28/2023          Resolved    * (Principal) Alcohol withdrawal syndrome with complication ([facility] [address] ) 3/20/7907     Resolved by  George Nicole PA-C    Alcohol intoxication ([facility] [address] ) 6/30/2023     Resolved by  George Nicole PA-C          * Alcohol withdrawal syndrome with complication (HCC)-resolved as of 6/30/2023  Assessment & Plan  Patient with a history of chronic daily alcohol use  Last drink the evening of 6/28  Serum alcohol 462 in the ED  Received 1 5 mg lorazepam in the ED PTA  Has been monitored on SEWS protocol and has not exhibited any signs or symptoms of alcohol withdrawal  Vital Signs have been stable throughout the admission  Patient did not require any phenobarbital during hospital admission  Appears stable from withdrawal perspective  Encouraged Alcohol Cessation upon discharge     Alcohol use disorder, severe, dependence ([facility] [address] )  Assessment & Plan  Patient with h/o chronic alcohol use  Currently consumes 1 handle of vodka daily  Last drink the evening of 6/28  Serum ethanol 462 (6/28/2023 1446)  Continue daily thiamine/folic acid supplementation  Manage withdrawal as above   Consult case management for assistance with disposition planning - patient will meet CRS worker upon discharge  Not interested in inpatient drug and alcohol rehab at this time       Depression  Assessment & Plan  · Reports recent decrease in mood due to increased alcohol Physical Therapy Initial Examination/Evaluation  November 4, 2019    Licha Birch is a 54 year old adult referred to physical therapy by Rashid Schuster MD for treatment of L ALE with Precautions/Restrictions/MD instructions posterior hip precautions    Therapist Impression:   Licha is presenting in satisfactory condition following the above procedure.  We will progress per MD protocol/instructions.    Subjective:  DOI/onset: na DOS: 10/25/2019  Acute Injury or Gradual Onset?: Gradual injury over time  Mechanism of Injury: OA  Related PMH: R hip OA Previous Treatment: None Effect of prior treatment: NA  Imaging: x-ray and MRI  Chief Complaint/Functional Limitations:   Walking with cane, stairs and see below in therapy evaluation codes   Pain: rest 0 /10, activity 4/10 Location: deep posterior, gluteFrequency: Intermittent Described as: aching Alleviated by: Pain medications 2 x day Progression of Symptoms: Gradually getting better. Time of day when pain is worse: Activity related  Sleeping: No issues/uninterrupted   Occupation:   Job duties: prolonged sitting, keyboarding/computer use  Current HEP/exercise regimen: Yoga, grandsons   Patient's goals are see chief complaints     Other pertinent PMH/Red Flags: High Blood Pressure, Menopausal, Osteoarthritis, Overweight, Smoking   Barriers at home/work: None as reported by patient  Pertinent Surgical History: Knee surgery in 80s, tonsils, adenoids  Medications: Pain  General health as reported by patient: good  Return to MD:  4 week thong    POST-OPERATIVE HIP EVALUATION    Gait: cane in R hand     Hip ROM Flexion ER (supine) IR (supine)   Left 60 na na   Right 75 35 -15     Unable to perform SLR on L, mini on R.  Good quad set B    Assessment/Plan:  Patient is a 54 year old adult with left side hip complaints.    Patient has the following significant findings with corresponding treatment plan.                Diagnosis 1:  L ALE  Pain -  hot/cold therapy,  manual therapy, splint/taping/bracing/orthotics, self management, education and home program  Decreased ROM/flexibility - manual therapy and therapeutic exercise  Decreased joint mobility - manual therapy and therapeutic exercise  Decreased strength - therapeutic exercise and therapeutic activities  Impaired gait - gait training  Impaired muscle performance - neuro re-education    Therapy Evaluation Codes:   1) History comprised of:   Personal factors that impact the plan of care:      None.    Comorbidity factors that impact the plan of care are:      None.     Medications impacting care: None.  2) Examination of Body Systems comprised of:   Body structures and functions that impact the plan of care:      Hip.   Activity limitations that impact the plan of care are:      Squatting/kneeling, Stairs, Standing and Walking.  3) Clinical presentation characteristics are:   Stable/Uncomplicated.  4) Decision-Making    Moderate complexity using standardized patient assessment instrument and/or measureable assessment of functional outcome.  Cumulative Therapy Evaluation is: Low complexity.    Previous and current functional limitations:  (See Goal Flow Sheet for this information)    Short term and Long term goals: (See Goal Flow Sheet for this information)     Communication ability:  Patient appears to be able to clearly communicate and understand verbal and written communication and follow directions correctly.  Treatment Explanation - The following has been discussed with the patient:   RX ordered/plan of care  Anticipated outcomes  Possible risks and side effects  This patient would benefit from PT intervention to resume normal activities.   Rehab potential is excellent.    Frequency:  1 X week, once daily  Duration:  for 4 weeks tapering to 2 X a month over 8 weeks  Discharge Plan:  Achieve all LTG.  Independent in home treatment program.  Reach maximal therapeutic benefit.    Please refer to the daily flowsheet for  use  · Denies SI/HI  · No continual observation indicated at this time  · Has never been treated for depression in the past  · Continue to monitor    Elevated LFTs  Assessment & Plan  Recent Labs     06/30/23  0552   AST 44*   ALT 47   ALKPHOS 41   TBILI 0 94     · Mild  · Encourage alcohol cessation  · In setting of chronic alcohol use  · Currently denies acute abdominal pain  · Continue to monitor   · Encourage alcohol cessation     Class 1 obesity due to excess calories without serious comorbidity with body mass index (BMI) of 31 0 to 31 9 in adult  Assessment & Plan  · Encourage healthy diet, exercise     Alcohol intoxication (HCC)-resolved as of 6/30/2023  Assessment & Plan  · Serum ethanol 462 (6/28/2023 1446)  · Appears clinically intoxicated currently   · Continue to monitor and manage withdrawal as above       Consultations During Hospital Stay:  · None    Procedures Performed:   · None    Significant Findings / Test Results:   · None    Incidental Findings:   · None     Test Results Pending at Discharge (will require follow up): · None     Outpatient Tests Requested:  · Follow up with PCP    Complications:  none    Reason for Admission: alcohol withdrawal    Hospital Course:     Cedrick Louise is a 31 y o  male patient who originally presented to the hospital on 6/28/2023 due to alcohol withdrawal  Patient initially presented to the [facility] ED requesting detoxification from alcohol  Patient was admitted to the [facility] medical detox unit under [facility] protocol for medically assisted alcohol withdrawal  Upon admission patient had an ethanol level of 462  He was monitored on SEWS protocol, initial dosage of phenobarbital was held due to acute alcohol intoxication and no withdrawal symptoms  Patient was monitored for development of withdrawal symptoms  He did not require any phenobarbital during hospital admission as patient developed mild withdrawal symptoms of anxiety   His vital signs remained stabled throughout "the whole admission  Patient did not require any electrolyte supplementation as he had no abnormalities on daily laboratory studies  Case management was consulted for assistance with aftercare resources, and patient will be discharged with outpatient resources  The patient, initially admitted to the hospital as inpatient, was discharged earlier than expected given the following: patient did not develop significant withdrawal      Please see above list of diagnoses and related plan for additional information  Condition at Discharge: stable     Discharge Day Visit / Exam:     Subjective:  Seen and examined at bedside  Continues to deny any withdrawal symptoms  Tolerating PO diet and fluids  Vitals: Blood Pressure: 130/78 (06/30/23 0710)  Pulse: 61 (06/30/23 0710)  Temperature: 97 5 °F (36 4 °C) (06/30/23 0710)  Temp Source: Temporal (06/30/23 0710)  Respirations: 16 (06/30/23 0710)  Height: 5' 7\" (170 2 cm) (06/28/23 2015)  Weight - Scale: 90 3 kg (199 lb) (06/28/23 2015)  SpO2: 95 % (06/30/23 0710)  Exam:   Physical Exam  Constitutional:       General: He is not in acute distress  Appearance: He is not diaphoretic  Eyes:      General: No scleral icterus  Pupils: Pupils are equal, round, and reactive to light  Cardiovascular:      Rate and Rhythm: Normal rate and regular rhythm  Heart sounds: No murmur heard  Neurological:      Mental Status: He is oriented to person, place, and time  Psychiatric:         Mood and Affect: Mood normal  Mood is not anxious or depressed  Behavior: Behavior normal          Thought Content: Thought content normal          Judgment: Judgment normal          Discussion with Family: Discussed with patient     Discharge instructions/Information to patient and family:   See after visit summary for information provided to patient and family        Provisions for Follow-Up Care:  See after visit summary for information related to follow-up care and any " treatment today, total treatment time and time spent performing 1:1 timed codes.        pertinent home health orders  Disposition:     Home    For Discharges to [facility] SNF:   · Not Applicable to this Patient - Not Applicable to this Patient    Planned Readmission: none      Discharge Statement:  I spent 35 minutes discharging the patient  This time was spent on the day of discharge  I had direct contact with the patient on the day of discharge  Greater than 50% of the total time was spent examining patient, answering all patient questions, arranging and discussing plan of care with patient as well as directly providing post-discharge instructions  Additional time then spent on discharge activities  Discharge Medications:  See after visit summary for reconciled discharge medications provided to patient and family        ** Please Note: This note has been constructed using a voice recognition system **

## 2023-08-18 DIAGNOSIS — Z96.642 PRESENCE OF LEFT ARTIFICIAL HIP JOINT: ICD-10-CM

## 2023-08-18 DIAGNOSIS — Z96.641 HISTORY OF RIGHT HIP REPLACEMENT: Primary | ICD-10-CM

## 2023-12-02 ENCOUNTER — HEALTH MAINTENANCE LETTER (OUTPATIENT)
Age: 58
End: 2023-12-02

## 2023-12-27 DIAGNOSIS — I10 ESSENTIAL HYPERTENSION: ICD-10-CM

## 2023-12-27 RX ORDER — AMLODIPINE BESYLATE 5 MG/1
5 TABLET ORAL DAILY
Qty: 90 TABLET | Refills: 1 | Status: SHIPPED | OUTPATIENT
Start: 2023-12-27 | End: 2024-02-07

## 2023-12-27 NOTE — TELEPHONE ENCOUNTER
Reason for Call:  Medication or medication refill:    Do you use a [facility]?  Name of the pharmacy and phone number for the current request:  [facility] on [address] in [place]     Name of the medication requested:    Disp Refills Start End POORNIMA   amLODIPine (NORVASC) 5 MG tablet            Other request: Patient is out of medication.  [facility] advised her to call clinic.  Per patient she requested refill last week.    Last Visit with PCP = 06/02/23  Next Visit with PCP = 02/07/24    Medication tere'd up for approval if appropriate.     Patient shares she had to reschedule her December 5th appt with PCP. Patient requesting high priority response for refill.     Call taken on 12/27/2023 at 10:50 AM by Eufemia Pak

## 2024-02-06 DIAGNOSIS — I10 ESSENTIAL HYPERTENSION: ICD-10-CM

## 2024-02-06 RX ORDER — LISINOPRIL AND HYDROCHLOROTHIAZIDE 12.5; 2 MG/1; MG/1
1 TABLET ORAL 2 TIMES DAILY
Qty: 180 TABLET | Refills: 1 | Status: CANCELLED | OUTPATIENT
Start: 2024-02-06

## 2024-02-07 ENCOUNTER — OFFICE VISIT (OUTPATIENT)
Dept: FAMILY MEDICINE | Facility: CLINIC | Age: 59
End: 2024-02-07
Attending: FAMILY MEDICINE
Payer: COMMERCIAL

## 2024-02-07 ENCOUNTER — ORDERS ONLY (AUTO-RELEASED) (OUTPATIENT)
Dept: FAMILY MEDICINE | Facility: CLINIC | Age: 59
End: 2024-02-07

## 2024-02-07 VITALS
RESPIRATION RATE: 16 BRPM | WEIGHT: 257.8 LBS | DIASTOLIC BLOOD PRESSURE: 62 MMHG | HEART RATE: 86 BPM | OXYGEN SATURATION: 98 % | TEMPERATURE: 98.2 F | SYSTOLIC BLOOD PRESSURE: 120 MMHG | HEIGHT: 67 IN | BODY MASS INDEX: 40.46 KG/M2

## 2024-02-07 DIAGNOSIS — Z12.11 SCREEN FOR COLON CANCER: ICD-10-CM

## 2024-02-07 DIAGNOSIS — Z12.31 VISIT FOR SCREENING MAMMOGRAM: ICD-10-CM

## 2024-02-07 DIAGNOSIS — I10 ESSENTIAL HYPERTENSION: Primary | ICD-10-CM

## 2024-02-07 PROBLEM — Z96.642 STATUS POST TOTAL HIP REPLACEMENT, LEFT: Status: ACTIVE | Noted: 2019-10-25

## 2024-02-07 PROBLEM — K76.0 HEPATIC STEATOSIS: Status: ACTIVE | Noted: 2024-02-07

## 2024-02-07 PROCEDURE — 99213 OFFICE O/P EST LOW 20 MIN: CPT | Performed by: FAMILY MEDICINE

## 2024-02-07 RX ORDER — AMLODIPINE BESYLATE 5 MG/1
5 TABLET ORAL DAILY
Qty: 90 TABLET | Refills: 3 | Status: SHIPPED | OUTPATIENT
Start: 2024-02-07

## 2024-02-07 RX ORDER — LISINOPRIL AND HYDROCHLOROTHIAZIDE 12.5; 2 MG/1; MG/1
2 TABLET ORAL DAILY
Qty: 180 TABLET | Refills: 3 | Status: SHIPPED | OUTPATIENT
Start: 2024-02-07

## 2024-02-07 RX ORDER — CALCIUM CARBONATE/VITAMIN D3 500-10/5ML
1 LIQUID (ML) ORAL DAILY
Qty: 90 CAPSULE | Refills: 3 | Status: SHIPPED | OUTPATIENT
Start: 2024-02-07

## 2024-02-07 NOTE — PATIENT INSTRUCTIONS
It is good to see you! I will see you in six months for our annual. If you could schedule the pap smear soon that would be great! Let me know if you want to schedule, I am happy to squeeze you in over lunch!

## 2024-02-07 NOTE — PROGRESS NOTES
"  Assessment & Plan     (I10) Essential hypertension  (primary encounter diagnosis)  Comment: Chronic, stable.   Plan: amLODIPine (NORVASC) 5 MG tablet,         lisinopril-hydrochlorothiazide (ZESTORETIC)         20-12.5 MG tablet, magnesium oxide 400 MG CAPS           (Z12.31) Visit for screening mammogram  Plan: MA SCREENING DIGITAL BILAT - Future  (s+30)    (Z12.11) Screen for colon cancer  Plan: QASIM([employer])         Prescription drug management  No LOS data to display   Time spent by me doing chart review, history and exam, documentation and further activities per the note      BMI  Estimated body mass index is 40.38 kg/m  as calculated from the following:    Height as of this encounter: 1.702 m (5' 7\").    Weight as of this encounter: 116.9 kg (257 lb 12.8 oz).   Weight management plan: Discussed healthy diet and exercise guidelines      See Patient Instructions    Subjective   Licha is a 58 year old, presenting for the following health issues:  Follow Up (hypertension)        2/7/2024     7:56 AM   Additional Questions   Roomed by Lelo     History of Present Illness       Reason for visit:  Visit    She eats 4 or more servings of fruits and vegetables daily.She consumes 0 sweetened beverage(s) daily.She exercises with enough effort to increase her heart rate 30 to 60 minutes per day.  She exercises with enough effort to increase her heart rate 4 days per week.   She is taking medications regularly.     Hypertension Follow-up    Do you check your blood pressure regularly outside of the clinic? Yes   Are you following a low salt diet? No  Are your blood pressures ever more than 140 on the top number (systolic) OR more   than 90 on the bottom number (diastolic), for example 140/90? No  How many servings of fruits and vegetables do you eat daily?  4 or more  On average, how many sweetened beverages do you drink each day (Examples: soda, juice, sweet tea, etc.  Do NOT count diet or artificially " "sweetened beverages)?   0  How many days per week do you exercise enough to make your heart beat faster? 4  How many minutes a day do you exercise enough to make your heart beat faster? 30 - 60  How many days per week do you miss taking your medication? 0        Review of Systems  Constitutional, HEENT, cardiovascular, pulmonary, gi and gu systems are negative, except as otherwise noted.      Objective    /62   Pulse 86   Temp 98.2  F (36.8  C) (Oral)   Resp 16   Ht 1.702 m (5' 7\")   Wt 116.9 kg (257 lb 12.8 oz)   LMP  (LMP Unknown)   SpO2 98%   BMI 40.38 kg/m    Body mass index is 40.38 kg/m .  Physical Exam   GENERAL: alert and no distress  EYES: Eyes grossly normal to inspection, PERRL and conjunctivae and sclerae normal  NECK: no adenopathy, no asymmetry, masses, or scars  MS: no gross musculoskeletal defects noted, no edema  SKIN: no suspicious lesions or rashes          Signed Electronically by: DANIEL GREGORY, DO    "

## 2024-03-08 LAB — NONINV COLON CA DNA+OCC BLD SCRN STL QL: NEGATIVE

## 2024-04-22 ENCOUNTER — TELEPHONE (OUTPATIENT)
Dept: ORTHOPEDICS | Facility: CLINIC | Age: 59
End: 2024-04-22
Payer: COMMERCIAL

## 2024-04-22 NOTE — TELEPHONE ENCOUNTER
- A call was placed to the patient. Patient did not answer phone so a voicemail was left.     -The following information was communicated:  Please reply to MyChart as antibiotic recommendation has changed.     - Call back number to clinic was given and patient was told to call back if they had questions about the following information and ask for Dr. Landen Presley's nurse.

## 2024-04-22 NOTE — TELEPHONE ENCOUNTER
Medication Question or concern regarding medication   Prescription Clarification  Name of Medication: predental antibiotic needed, dental work needed to remove new wisdom tooth coming in, appt on 4/26  Prescribing Provider: Dr. Schuster   Pharmacy: [facility] in [place] on [address] [facility]   What on the order needs clarification? Nothing, would like Rx by Wed.        Could we send this information to you in [email or web address] or would you prefer to receive a phone call?:   Patient would prefer a phone call   Okay to leave a detailed message?: Yes at Cell number on file:    Telephone Information:   Mobile [phone]

## 2024-05-03 ENCOUNTER — PATIENT OUTREACH (OUTPATIENT)
Dept: CARE COORDINATION | Facility: CLINIC | Age: 59
End: 2024-05-03
Payer: COMMERCIAL

## 2024-05-17 ENCOUNTER — PATIENT OUTREACH (OUTPATIENT)
Dept: CARE COORDINATION | Facility: CLINIC | Age: 59
End: 2024-05-17
Payer: COMMERCIAL

## 2024-09-07 ENCOUNTER — HEALTH MAINTENANCE LETTER (OUTPATIENT)
Age: 59
End: 2024-09-07

## 2025-01-08 ENCOUNTER — PATIENT OUTREACH (OUTPATIENT)
Dept: CARE COORDINATION | Facility: CLINIC | Age: 60
End: 2025-01-08
Payer: COMMERCIAL

## 2025-01-28 ENCOUNTER — TELEPHONE (OUTPATIENT)
Dept: FAMILY MEDICINE | Facility: CLINIC | Age: 60
End: 2025-01-28
Payer: COMMERCIAL

## 2025-01-28 NOTE — TELEPHONE ENCOUNTER
Incoming call from patient.  She states that she tested positive for COVID today.  Her symptoms started on Sunday.  She is not having any difficulty breathing.  Symptoms include cold symptoms and loss of smell and taste.  Patient is on amlodipine and not eligible for RN Paxlovid protocol.  Scheduled patient for virtual visit tomorrow.  Patient is going to try to submit e-visit and will cancel virtual appointment if she is able to get Paxlovid through e-visit

## 2025-01-29 ENCOUNTER — VIRTUAL VISIT (OUTPATIENT)
Dept: URGENT CARE | Facility: CLINIC | Age: 60
End: 2025-01-29
Payer: COMMERCIAL

## 2025-01-29 DIAGNOSIS — U07.1 INFECTION DUE TO 2019 NOVEL CORONAVIRUS: Primary | ICD-10-CM

## 2025-01-29 PROCEDURE — 98013 SYNCH AUDIO-ONLY EST LOW 20: CPT | Performed by: NURSE PRACTITIONER

## 2025-01-29 RX ORDER — IBUPROFEN 600 MG/1
TABLET, FILM COATED ORAL
COMMUNITY
Start: 2024-06-14

## 2025-01-29 NOTE — PROGRESS NOTES
"Licha is a 59 year old who is being evaluated via a billable telephone visit.      Originating Location (pt. Location): Home    Distant Location (provider location):  Off-site  Telephone visit completed due to the patient did not consent to a video visit.    Assessment & Plan   Problem List Items Addressed This Visit    None  Visit Diagnoses       Infection due to 2019 novel coronavirus    -  Primary    Relevant Medications    nirmatrelvir and ritonavir (PAXLOVID) 300 mg/100 mg therapy pack                  BMI  Estimated body mass index is 40.38 kg/m  as calculated from the following:    Height as of 2/7/24: 1.702 m (5' 7\").    Weight as of 2/7/24: 116.9 kg (257 lb 12.8 oz).     COVID-19 positive patient.  Encounter for consideration of medication intervention. Patient does qualify for a prescription. Full discussion with patient including medication options, risks and benefits. Potential drug interactions reviewed with patient.     Treatment Planned  paxlovid sent to [facility]    Temporary change to home medications: Will monitor blood pressure daily before taking amlodipine.  If blood pressure is less than 110/60 or she feels lightheaded and dizzy will hold her amlodipine that day.  Will do this while she is on Paxlovid and continue to do it 3 more days after Paxlovid treatment is completed    Estimated body mass index is 40.38 kg/m  as calculated from the following:    Height as of 2/7/24: 1.702 m (5' 7\").    Weight as of 2/7/24: 116.9 kg (257 lb 12.8 oz).  GFR Estimate   Date Value Ref Range Status   09/26/2022 70 >60 mL/min/1.73m2 Final     Comment:     GFR not calculated when sex unspecified or nonbinary.  Effective December 21, 2021 eGFRcr in adults is calculated using the 2021 CKD-EPI creatinine equation which includes age and gender (Ahmet caldwell al., NEJM, DOI: 10.1056/GKIJlp9907864)   06/28/2021 >60 >60 mL/min/1.73m2 Final   01/18/2020 63 >60 mL/min/[1.73_m2] Final     Comment:     Non  GFR " "Calc  Starting 12/18/2018, serum creatinine based estimated GFR (eGFR) will be   calculated using the Chronic Kidney Disease Epidemiology Collaboration   (CKD-EPI) equation.       No results found for: \"SJYGH87GUX\"    Return in about 1 month (around 2/28/2025), or if symptoms worsen or fail to improve.      Subjective   Licha is a 59 year old, presenting for the following health issues:  No chief complaint on file.    HPI       COVID-19 Symptom Review  How many days ago did these symptoms start? 3 days ago    Are any of the following symptoms significant for you?  New or worsening difficulty breathing? No  Worsening cough? Yes, it's a dry cough.   Fever or chills? Yes, the highest temperature was 99.5  Headache: YES  Sore throat: No  Chest pain: No  Diarrhea: No  Body aches? YES    What treatments has patient tried? Nonsteroidals   Does patient live in a nursing home, group home, or shelter? No  Does patient have a way to get food/medications during quarantined? Yes, I have a friend or family member who can help me.                Review of Systems  Constitutional, HEENT, cardiovascular, pulmonary, gi and gu systems are negative, except as otherwise noted.      Objective           Vitals:  No vitals were obtained today due to virtual visit.    Physical Exam   General: Alert and no distress //Respiratory: No audible wheeze, cough, or shortness of breath // Psychiatric:  Appropriate affect, tone, and pace of words            Phone call duration: 13 minutes  Signed Electronically by: [facility]    "

## 2025-01-29 NOTE — PATIENT INSTRUCTIONS
Monitor your blood pressure daily before taking your amlodipine.  If your blood pressure is less than 110/60 or you feel lightheaded and dizzy, hold your amlodipine that day.  Do this while you are taking Paxlovid and continue to do it for 3 more days after Paxlovid treatment is completed.      Coronavirus (COVID-19): Care Instructions  What is COVID-19?  COVID-19 is a disease caused by a type of coronavirus. This illness was first found in 2019 and has since spread worldwide (pandemic). Symptoms can range from mild, such as fever and body aches, to severe, including trouble breathing. COVID-19 can be deadly.  Coronaviruses are a large group of viruses. Some types cause the common cold. Others cause more serious illnesses like Middle East respiratory syndrome (MERS) and severe acute respiratory syndrome (SARS).  Follow-up care is a key part of your treatment and safety. Be sure to make and go to all appointments, and call your doctor if you are having problems. It's also a good idea to know your test results and keep a list of the medicines you take.  How can you self-isolate when you have COVID-19?  If you have COVID-19, there are things you can do to help avoid spreading the virus to others.  Stay home, and avoid contact with other people.  Limit contact with people in your home. If possible, stay in a separate bedroom and use a separate bathroom.  Wear a high-quality mask when you are around other people.  Improve airflow. If you have to spend time indoors with others, open windows and doors. Or you can use a fan to blow air away from people and out a window.  Avoid contact with pets and other animals.  Cover your mouth and nose with a tissue when you cough or sneeze. Then throw it in the trash right away.  Wash your hands often, especially after you cough or sneeze. Use soap and water, and scrub for at least 20 seconds. If soap and water aren't available, use an alcohol-based hand .  Don't share  "personal household items. These include bedding, towels, cups and glasses, and eating utensils.  Wash laundry in the warmest water allowed for the fabric type, and dry it completely. It's okay to wash other people's laundry with yours.  Clean and disinfect your home. Use household  and disinfectant wipes or sprays.  Go to the CDC website at cdc.gov if you have questions.  When can you end self-isolation for COVID-19?  If you know or think that you have the virus, you may need to self-isolate. When you can be around other people you live with and leave home depends on whether you have symptoms.  If you tested positive but had no symptoms, wear a mask for at least 5 days.  If you have symptoms, you need to wait until your symptoms are getting better and you haven't had a fever for 24 hours while not taking medicines to lower the fever. Once you leave isolation, wear a mask for at least 5 more days when you are around other people.  If you were very sick, were in the hospital for COVID, or have a weakened immune system, talk to your doctor about how long you should isolate and wear a mask. It might be longer than 5 days.  Call your doctor or seek care if you have questions about your symptoms or when to end isolation.  Check the CDC website at cdc.gov for the most current information.  Where can you learn more?  Go to https://www.[employer].net/patiented  Enter C007 in the search box to learn more about \"Coronavirus (COVID-19): Care Instructions.\"  Current as of: October 28, 2024  Content Version: 14.3    2024 [employer].   Care instructions adapted under license by your healthcare professional. If you have questions about a medical condition or this instruction, always ask your healthcare professional. [employer] disclaims any warranty or liability for your use of this information.    "

## 2025-01-31 DIAGNOSIS — I10 ESSENTIAL HYPERTENSION: ICD-10-CM

## 2025-01-31 NOTE — TELEPHONE ENCOUNTER
FAX Pharmacy Prescription Refill Request     magnesium oxide 400 MG CAPS   lisinopril-hydrochlorothiazide (ZESTORETIC) 20-12.5 MG tablet   amLODIPine (NORVASC) 5 MG tablet     Last Visit with PCP:  02/07/24  Next Visit with PCP:  nothing scheduled

## 2025-02-03 RX ORDER — AMLODIPINE BESYLATE 5 MG/1
5 TABLET ORAL DAILY
Qty: 30 TABLET | Refills: 0 | Status: SHIPPED | OUTPATIENT
Start: 2025-02-03 | End: 2025-02-04

## 2025-02-03 RX ORDER — LISINOPRIL AND HYDROCHLOROTHIAZIDE 12.5; 2 MG/1; MG/1
2 TABLET ORAL DAILY
Qty: 60 TABLET | Refills: 0 | Status: SHIPPED | OUTPATIENT
Start: 2025-02-03 | End: 2025-02-04

## 2025-02-03 NOTE — TELEPHONE ENCOUNTER
Covering for PCP    Has been more than a year since patient last saw the provider    Needs to come in for ongoing refills    Refill x 1 month provided

## 2025-02-04 RX ORDER — LISINOPRIL AND HYDROCHLOROTHIAZIDE 12.5; 2 MG/1; MG/1
2 TABLET ORAL DAILY
Qty: 180 TABLET | Refills: 1 | Status: SHIPPED | OUTPATIENT
Start: 2025-02-04

## 2025-02-04 RX ORDER — AMLODIPINE BESYLATE 5 MG/1
5 TABLET ORAL DAILY
Qty: 90 TABLET | Refills: 1 | Status: SHIPPED | OUTPATIENT
Start: 2025-02-04

## 2025-02-04 NOTE — TELEPHONE ENCOUNTER
Contacted patient to schedule PHY with PCP.  Patient prefers to see PCP and early morning appointment. First available with Dr. Santoyo 06/25/25.    Patient is requesting 90 day supply and refills until appointment on 06/25/25    magnesium oxide 400 MG CAPS   lisinopril-hydrochlorothiazide (ZESTORETIC) 20-12.5 MG tablet   amLODIPine (NORVASC) 5 MG tablet

## 2025-06-09 ENCOUNTER — NURSE TRIAGE (OUTPATIENT)
Dept: FAMILY MEDICINE | Facility: CLINIC | Age: 60
End: 2025-06-09
Payer: COMMERCIAL

## 2025-06-09 NOTE — TELEPHONE ENCOUNTER
"Nurse Triage SBAR    Is this a 2nd Level Triage? NO    Situation: Dizziness    Background: started Saturday and continued Sunday. No hx vertigo    Assessment: States she woke up with this dizziness both Saturday and Sunday but it resolved throughout the day both times. States today she woke up again with the dizziness and nausea. Denies vomiting or other symptoms. States it feels \"like my second glass of wine.\" States she is still able to walk but is mildly unsteady due to the dizziness. Denies any signs of dehydration and states she drinks 30-40 oz of water daily.    Protocol Recommended Disposition:   Go To Office Now    Recommendation: advised she should be seen at urgent care today due to no appointments available in clinic. She verbalized understanding and agrees with this plan.          Does the patient meet one of the following criteria for ADS visit consideration? 16+ years old, with an [facility] PCP     TIP  Providers, please consider if this condition is appropriate for management at one of our Acute and Diagnostic Services sites.     If patient is a good candidate, please use dotphrase <dot>triageresponse and select Refer to ADS to document.      Reason for Disposition   Spinning or tilting sensation (vertigo) present now    Additional Information   Negative: SEVERE dizziness (e.g., unable to stand, requires support to walk, feels like passing out now)   Negative: SEVERE headache or neck pain   Negative: Spinning or tilting sensation (vertigo) present now and one or more stroke risk factors (i.e., hypertension, diabetes mellitus, prior stroke/TIA, heart attack, age over 60) (Exception: Prior physician evaluation for this AND no different/worse than usual.)   Negative: Neurologic deficit that was brief (now gone), ANY of the following:* Weakness of the face, arm, or leg on one side of the body* Numbness of the face, arm, or leg on one side of the body* Loss of speech or garbled speech   Negative: Loss of " "vision or double vision  (Exception: Similar to previous migraines.)   Negative: Extra heartbeats, irregular heart beating, or heart is beating very fast (i.e., 'palpitations')   Negative: Difficulty breathing   Negative: Drinking very little and dehydration suspected (e.g., no urine > 12 hours, very dry mouth, very lightheaded)   Negative: Follows bleeding (e.g., stomach, rectum, vagina)  (Exception: Became dizzy from sight of small amount blood.)   Negative: Patient sounds very sick or weak to the triager   Negative: Lightheadedness (dizziness) present now, after 2 hours of rest and fluids   Negative: Chest pain   Negative: Rectal bleeding, bloody stool, or tarry-black stool   Negative: Vomiting is main symptom   Negative: Diarrhea is main symptom   Negative: Headache is main symptom   Negative: Heat exhaustion suspected (i.e., dehydration from heat exposure)   Negative: Patient states that they are having an anxiety or panic attack   Negative: Dizziness from low blood sugar (i.e., < 60 mg/dl or 3.5 mmol/l)   Negative: SEVERE difficulty breathing (e.g., struggling for each breath, speaks in single words)   Negative: Shock suspected (e.g., cold/pale/clammy skin, too weak to stand, low BP, rapid pulse)   Negative: Difficult to awaken or acting confused (e.g., disoriented, slurred speech)   Negative: Fainted, and still feels dizzy afterwards   Negative: Overdose (accidental or intentional) of medications   Negative: New neurologic deficit that is present now: * Weakness of the face, arm, or leg on one side of the body * Numbness of the face, arm, or leg on one side of the body * Loss of speech or garbled speech   Negative: Heart beating < 50 beats per minute OR > 140 beats per minute   Negative: Sounds like a life-threatening emergency to the triager    Answer Assessment - Initial Assessment Questions  1. DESCRIPTION: \"Describe your dizziness.\"      dizzy  2. LIGHTHEADED: \"Do you feel lightheaded?\" (e.g., somewhat " "faint, woozy, weak upon standing)      \"Kind of\"  3. VERTIGO: \"Do you feel like either you or the room is spinning or tilting?\" (i.e. vertigo)      \"Feels like the second glass of wine\"  4. SEVERITY: \"How bad is it?\"  \"Do you feel like you are going to faint?\" \"Can you stand and walk?\"    - MILD: Feels slightly dizzy, but walking normally.    - MODERATE: Feels unsteady when walking, but not falling; interferes with normal activities (e.g., school, work).    - SEVERE: Unable to walk without falling, or requires assistance to walk without falling; feels like passing out now.       Able to walk, gets better throughout the day  5. ONSET:  \"When did the dizziness begin?\"      Saturday morning  6. AGGRAVATING FACTORS: \"Does anything make it worse?\" (e.g., standing, change in head position)      Denies   7. HEART RATE: \"Can you tell me your heart rate?\" \"How many beats in 15 seconds?\"  (Note: not all patients can do this)        Unsure, but between 85-90  8. CAUSE: \"What do you think is causing the dizziness?\"      unknown  9. RECURRENT SYMPTOM: \"Have you had dizziness before?\" If Yes, ask: \"When was the last time?\" \"What happened that time?\"      Denies   10. OTHER SYMPTOMS: \"Do you have any other symptoms?\" (e.g., fever, chest pain, vomiting, diarrhea, bleeding)        Nausea,   11. PREGNANCY: \"Is there any chance you are pregnant?\" \"When was your last menstrual period?\"        N/A    Protocols used: Dizziness-A-OH    "

## 2025-06-11 ENCOUNTER — OFFICE VISIT (OUTPATIENT)
Dept: URGENT CARE | Facility: URGENT CARE | Age: 60
End: 2025-06-11
Payer: COMMERCIAL

## 2025-06-11 VITALS
TEMPERATURE: 99.1 F | RESPIRATION RATE: 20 BRPM | DIASTOLIC BLOOD PRESSURE: 93 MMHG | SYSTOLIC BLOOD PRESSURE: 134 MMHG | OXYGEN SATURATION: 95 % | HEART RATE: 94 BPM

## 2025-06-11 DIAGNOSIS — R42 LIGHTHEADEDNESS: Primary | ICD-10-CM

## 2025-06-11 LAB
ANION GAP SERPL CALCULATED.3IONS-SCNC: 5 MMOL/L (ref 3–14)
ATRIAL RATE - MUSE: 89 BPM
BASOPHILS # BLD AUTO: 0.1 10E3/UL (ref 0–0.2)
BASOPHILS NFR BLD AUTO: 1 %
BUN SERPL-MCNC: 12 MG/DL (ref 7–30)
CALCIUM SERPL-MCNC: 9.4 MG/DL (ref 8.5–10.1)
CHLORIDE BLD-SCNC: 103 MMOL/L (ref 94–109)
CO2 SERPL-SCNC: 30 MMOL/L (ref 20–32)
CREAT SERPL-MCNC: 0.8 MG/DL (ref 0.52–1.04)
DIASTOLIC BLOOD PRESSURE - MUSE: NORMAL MMHG
EGFRCR SERPLBLD CKD-EPI 2021: 84 ML/MIN/1.73M2
EOSINOPHIL # BLD AUTO: 0.1 10E3/UL (ref 0–0.7)
EOSINOPHIL NFR BLD AUTO: 1 %
ERYTHROCYTE [DISTWIDTH] IN BLOOD BY AUTOMATED COUNT: 13.3 % (ref 10–15)
GLUCOSE BLD-MCNC: 100 MG/DL (ref 70–99)
HCT VFR BLD AUTO: 45.8 % (ref 35–47)
HGB BLD-MCNC: 15.2 G/DL (ref 11.7–15.7)
IMM GRANULOCYTES # BLD: 0 10E3/UL
IMM GRANULOCYTES NFR BLD: 0 %
INTERPRETATION ECG - MUSE: NORMAL
LYMPHOCYTES # BLD AUTO: 3.2 10E3/UL (ref 0.8–5.3)
LYMPHOCYTES NFR BLD AUTO: 29 %
MCH RBC QN AUTO: 31.3 PG (ref 26.5–33)
MCHC RBC AUTO-ENTMCNC: 33.2 G/DL (ref 31.5–36.5)
MCV RBC AUTO: 94 FL (ref 78–100)
MONOCYTES # BLD AUTO: 0.7 10E3/UL (ref 0–1.3)
MONOCYTES NFR BLD AUTO: 6 %
NEUTROPHILS # BLD AUTO: 6.7 10E3/UL (ref 1.6–8.3)
NEUTROPHILS NFR BLD AUTO: 63 %
P AXIS - MUSE: 58 DEGREES
PLATELET # BLD AUTO: 325 10E3/UL (ref 150–450)
POTASSIUM BLD-SCNC: 4 MMOL/L (ref 3.4–5.3)
PR INTERVAL - MUSE: 148 MS
QRS DURATION - MUSE: 72 MS
QT - MUSE: 380 MS
QTC - MUSE: 462 MS
R AXIS - MUSE: -12 DEGREES
RBC # BLD AUTO: 4.85 10E6/UL (ref 3.8–5.2)
SODIUM SERPL-SCNC: 138 MMOL/L (ref 135–145)
SYSTOLIC BLOOD PRESSURE - MUSE: NORMAL MMHG
T AXIS - MUSE: 17 DEGREES
VENTRICULAR RATE- MUSE: 89 BPM
WBC # BLD AUTO: 10.7 10E3/UL (ref 4–11)

## 2025-06-11 PROCEDURE — 93005 ELECTROCARDIOGRAM TRACING: CPT | Performed by: STUDENT IN AN ORGANIZED HEALTH CARE EDUCATION/TRAINING PROGRAM

## 2025-06-11 PROCEDURE — 36415 COLL VENOUS BLD VENIPUNCTURE: CPT | Performed by: STUDENT IN AN ORGANIZED HEALTH CARE EDUCATION/TRAINING PROGRAM

## 2025-06-11 PROCEDURE — 99214 OFFICE O/P EST MOD 30 MIN: CPT | Performed by: STUDENT IN AN ORGANIZED HEALTH CARE EDUCATION/TRAINING PROGRAM

## 2025-06-11 PROCEDURE — 80048 BASIC METABOLIC PNL TOTAL CA: CPT | Performed by: STUDENT IN AN ORGANIZED HEALTH CARE EDUCATION/TRAINING PROGRAM

## 2025-06-11 PROCEDURE — 85025 COMPLETE CBC W/AUTO DIFF WBC: CPT | Performed by: STUDENT IN AN ORGANIZED HEALTH CARE EDUCATION/TRAINING PROGRAM

## 2025-06-11 PROCEDURE — 3074F SYST BP LT 130 MM HG: CPT | Performed by: STUDENT IN AN ORGANIZED HEALTH CARE EDUCATION/TRAINING PROGRAM

## 2025-06-11 PROCEDURE — 3079F DIAST BP 80-89 MM HG: CPT | Performed by: STUDENT IN AN ORGANIZED HEALTH CARE EDUCATION/TRAINING PROGRAM

## 2025-06-11 RX ORDER — MECLIZINE HYDROCHLORIDE 25 MG/1
25 TABLET ORAL 3 TIMES DAILY PRN
Qty: 30 TABLET | Refills: 0 | Status: SHIPPED | OUTPATIENT
Start: 2025-06-11

## 2025-06-11 NOTE — PROGRESS NOTES
Assessment & Plan     Lightheadedness  58yo F with hx of hypertension presenting with vague symptom describes as both lightheadedness/dizziness that are usually present in AM and resolved by evening hours.  Very broad differential including arrhythmia, orthostasis, BPPV, viral illness, infection, anemia, electrolyte derangement, medication reaction,- etc. Here, VS within normal limits- afebrile, BP wnl, HR 90s. Has unremarkable cardiopulmonary exam, entirely normal neuro exam including gait and sensation changes. Does not have nystagmus or inducible dizziness. No signs/symptoms that would be concerning stroke, and fluctuation of symptoms does not fit this either.      Work up here largely unremarkable. Orthostatic vitals checked, and no e/o orthostasis.  Remainder of work up reassuring against serious pathology. ECG w/ NSR, no concerning ST/T wave changes. CBC wnl- no leukocytosis or anemia. BMP wnl, without electrolyte derangement or LORRI. Discussed with pt the large possible differential for her symptoms and cannot rule out all potential etiologies but have rule out most concerning causes. Given ?some component of worse symptom intermittently with head movement it is reasonable to try meclizine and have close follow up with PCP. She is agreeable to this plan.     - Trial outpatient meclizine for symptoms prn   - Monitor symptoms, return to care for worsening dizziness. Discussed high risk symptoms including headache, fevers, dizziness, neck stiffness, hearing loss, any acute onset neurologic changes that would require immediate evaluation. Verbalized understanding of this   - Should see PCP in one week if symptoms are not entirely resolved          No follow-ups on file.    Carissa Lindsay MD  Resident   [facility] URGENT [facility]    Chandana Hurt is a 59 year old female who presents to clinic today for the following health issues:  Chief Complaint   Patient presents with    Dizziness     And  "lightheaded x Saturday. No headaches or fever. Little nausea but no vomit.      HPI    59y F with history of hypertension, hepatic steatosis who presents to the ER with history of both lightheadedness, and previously some room spinning dizziness that started 4 days ago.      - Four days ago woke with some dizziness, diarrhea, and nausea. Describes dizziness as \"sort of\" like room spinning at onset   - Felt poorly and spent most the day in bed   - Since that time is having improvement in her symptoms but still feel \"off\"  - Describes how she feels as a now lightheadedness now, and \"like I have had two glasses of wine\"  - Has not had any sort of lightheadedness where she felt faint, and has not noticed any significant postural component changes   - Symptoms worse in the AM and then resolve by evening. She has not been taking any nighttime medications aside from glucosamine w/ tumeric  - She is not taking any other supplements   - She sometimes notices worsening symptoms with rapid movement of her head, only intermittently    - No headache, vision changes, numbness, weakness, tingling, gait change, chest pain, shortness of breath, lower extremity swelling, hearing changes   - no further diarrhea, nausea, vomiting, urinary symptoms   - Has checked BP multiple time at home and has ranged 120s-140s/60s  - Had been using THC/CBC gummies but has not had any since symptom onset     Review of Systems  Constitutional, HEENT, cardiovascular, pulmonary, gi and gu systems are negative, except as otherwise noted.      Objective    /85   Pulse 79   Temp 99.1  F (37.3  C) (Tympanic)   Resp 20   LMP  (LMP Unknown)   SpO2 95%   Physical Exam   GENERAL: alert and no distress  NECK: no adenopathy, no asymmetry, masses, or scars  RESP: lungs clear to auscultation - no rales, rhonchi or wheezes  CV: regular rate and rhythm, normal S1 S2, no S3 or S4, no murmur, click or rub, no peripheral edema  ABDOMEN: soft, nontender, no " hepatosplenomegaly, no masses and bowel sounds normal  MS: no gross musculoskeletal defects noted, no edema  Neuro: CN II-XII intact. 5/5 strength and sensation in upper and lower extremities. PERRL. Sensation intact in upper and lower extremities to gross touch. Normal coordination, normal rapid alternating movements, normal heel shin     Last Comprehensive Metabolic Panel:  Lab Results   Component Value Date     06/11/2025    POTASSIUM 4.0 06/11/2025    CHLORIDE 103 06/11/2025    CO2 30 06/11/2025    ANIONGAP 5 06/11/2025     (H) 06/11/2025    BUN 12 06/11/2025    CR 0.80 06/11/2025    GFRESTIMATED 84 06/11/2025    NATA 9.4 06/11/2025       Lab Results   Component Value Date    WBC 10.7 06/11/2025    WBC 10.3 01/14/2020     Lab Results   Component Value Date    RBC 4.85 06/11/2025    RBC 4.39 01/14/2020     Lab Results   Component Value Date    HGB 15.2 06/11/2025    HGB 10.4 01/18/2020     Lab Results   Component Value Date    HCT 45.8 06/11/2025    HCT 41.9 01/14/2020     Lab Results   Component Value Date    MCV 94 06/11/2025    MCV 95 01/14/2020     Lab Results   Component Value Date    MCH 31.3 06/11/2025    MCH 31.0 01/14/2020     Lab Results   Component Value Date    MCHC 33.2 06/11/2025    MCHC 32.5 01/14/2020     Lab Results   Component Value Date    RDW 13.3 06/11/2025    RDW 13.7 01/14/2020     Lab Results   Component Value Date     06/11/2025     01/14/2020       ECG: NSR

## 2025-06-11 NOTE — PATIENT INSTRUCTIONS
You were seen today for lightheadedness. We check your heart tracing with an ECG which did not show any concerning findings for your dizziness, we also look at some routine labs- which showed normal blood counts, no signs of anemia or infection. Your electrolytes and kidney function were also normal on this check. Everything looks reassuring.     We can try some meclizine at home to see if this helps with your dizziness.     If you have worsening of your symptoms, develop headaches, have any new weakness/numbness/tingling/gait issues then you should be seen immediately in the ER as this may be a sign of an emergency.     Please follow up with your PCP in one week to check in on your symptoms.

## 2025-06-11 NOTE — PROGRESS NOTES
Urgent Care Clinic Visit    Chief Complaint   Patient presents with    Dizziness     And lightheaded x Saturday. No headaches or fever. Little nausea but no vomit.                6/11/2025    12:52 PM   Additional Questions   Roomed by Roopa DUMONT MA   Accompanied by Self         6/11/2025   Declines Weight   Did patient decline having their weight taken? Yes         Roopa Tsai MA on 06/11/2025 at 12:54 PM

## 2025-06-22 SDOH — HEALTH STABILITY: PHYSICAL HEALTH: ON AVERAGE, HOW MANY DAYS PER WEEK DO YOU ENGAGE IN MODERATE TO STRENUOUS EXERCISE (LIKE A BRISK WALK)?: 4 DAYS

## 2025-06-22 SDOH — HEALTH STABILITY: PHYSICAL HEALTH: ON AVERAGE, HOW MANY MINUTES DO YOU ENGAGE IN EXERCISE AT THIS LEVEL?: 20 MIN

## 2025-06-22 ASSESSMENT — SOCIAL DETERMINANTS OF HEALTH (SDOH): HOW OFTEN DO YOU GET TOGETHER WITH FRIENDS OR RELATIVES?: MORE THAN THREE TIMES A WEEK

## 2025-06-25 ENCOUNTER — OFFICE VISIT (OUTPATIENT)
Dept: FAMILY MEDICINE | Facility: CLINIC | Age: 60
End: 2025-06-25
Payer: COMMERCIAL

## 2025-06-25 VITALS
DIASTOLIC BLOOD PRESSURE: 80 MMHG | HEIGHT: 67 IN | WEIGHT: 255.7 LBS | RESPIRATION RATE: 20 BRPM | SYSTOLIC BLOOD PRESSURE: 110 MMHG | OXYGEN SATURATION: 95 % | BODY MASS INDEX: 40.13 KG/M2 | TEMPERATURE: 98.1 F | HEART RATE: 85 BPM

## 2025-06-25 DIAGNOSIS — Z00.00 ROUTINE GENERAL MEDICAL EXAMINATION AT A HEALTH CARE FACILITY: Primary | ICD-10-CM

## 2025-06-25 DIAGNOSIS — Z12.31 VISIT FOR SCREENING MAMMOGRAM: ICD-10-CM

## 2025-06-25 DIAGNOSIS — I10 ESSENTIAL HYPERTENSION: ICD-10-CM

## 2025-06-25 DIAGNOSIS — R20.0 BILATERAL HAND NUMBNESS: ICD-10-CM

## 2025-06-25 PROCEDURE — 3079F DIAST BP 80-89 MM HG: CPT | Performed by: FAMILY MEDICINE

## 2025-06-25 PROCEDURE — 99396 PREV VISIT EST AGE 40-64: CPT | Mod: 25 | Performed by: FAMILY MEDICINE

## 2025-06-25 PROCEDURE — 3074F SYST BP LT 130 MM HG: CPT | Performed by: FAMILY MEDICINE

## 2025-06-25 PROCEDURE — 99214 OFFICE O/P EST MOD 30 MIN: CPT | Mod: 25 | Performed by: FAMILY MEDICINE

## 2025-06-25 RX ORDER — LISINOPRIL AND HYDROCHLOROTHIAZIDE 12.5; 2 MG/1; MG/1
2 TABLET ORAL DAILY
Qty: 180 TABLET | Refills: 3 | Status: SHIPPED | OUTPATIENT
Start: 2025-06-25

## 2025-06-25 RX ORDER — AMLODIPINE BESYLATE 5 MG/1
5 TABLET ORAL DAILY
Qty: 90 TABLET | Refills: 3 | Status: SHIPPED | OUTPATIENT
Start: 2025-06-25

## 2025-06-25 NOTE — PATIENT INSTRUCTIONS
Patient Education   Preventive Care Advice   This is general advice given by our system to help you stay healthy. However, your care team may have specific advice just for you. Please talk to your care team about your preventive care needs.  Nutrition  Eat 5 or more servings of fruits and vegetables each day.  Try wheat bread, brown rice and whole grain pasta (instead of white bread, rice, and pasta).  Get enough calcium and vitamin D. Check the label on foods and aim for 100% of the RDA (recommended daily allowance).  Lifestyle  Exercise at least 150 minutes each week  (30 minutes a day, 5 days a week).  Do muscle strengthening activities 2 days a week. These help control your weight and prevent disease.  No smoking.  Wear sunscreen to prevent skin cancer.  Have a dental exam and cleaning every 6 months.  Yearly exams  See your health care team every year to talk about:  Any changes in your health.  Any medicines your care team has prescribed.  Preventive care, family planning, and ways to prevent chronic diseases.  Shots (vaccines)   HPV shots (up to age 26), if you've never had them before.  Hepatitis B shots (up to age 59), if you've never had them before.  COVID-19 shot: Get this shot when it's due.  Flu shot: Get a flu shot every year.  Tetanus shot: Get a tetanus shot every 10 years.  Pneumococcal, hepatitis A, and RSV shots: Ask your care team if you need these based on your risk.  Shingles shot (for age 50 and up)  General health tests  Diabetes screening:  Starting at age 35, Get screened for diabetes at least every 3 years.  If you are younger than age 35, ask your care team if you should be screened for diabetes.  Cholesterol test: At age 39, start having a cholesterol test every 5 years, or more often if advised.  Bone density scan (DEXA): At age 50, ask your care team if you should have this scan for osteoporosis (brittle bones).  Hepatitis C: Get tested at least once in your life.  STIs (sexually  transmitted infections)  Before age 24: Ask your care team if you should be screened for STIs.  After age 24: Get screened for STIs if you're at risk. You are at risk for STIs (including HIV) if:  You are sexually active with more than one person.  You don't use condoms every time.  You or a partner was diagnosed with a sexually transmitted infection.  If you are at risk for HIV, ask about PrEP medicine to prevent HIV.  Get tested for HIV at least once in your life, whether you are at risk for HIV or not.  Cancer screening tests  Cervical cancer screening: If you have a cervix, begin getting regular cervical cancer screening tests starting at age 21.  Breast cancer scan (mammogram): If you've ever had breasts, begin having regular mammograms starting at age 40. This is a scan to check for breast cancer.  Colon cancer screening: It is important to start screening for colon cancer at age 45.  Have a colonoscopy test every 10 years (or more often if you're at risk) Or, ask your provider about stool tests like a FIT test every year or Cologuard test every 3 years.  To learn more about your testing options, visit:   .  For help making a decision, visit:   https://bit.ly/zo91652.  Prostate cancer screening test: If you have a prostate, ask your care team if a prostate cancer screening test (PSA) at age 55 is right for you.  Lung cancer screening: If you are a current or former smoker ages 50 to 80, ask your care team if ongoing lung cancer screenings are right for you.  For informational purposes only. Not to replace the advice of your health care provider. Copyright   2023 [facility]. All rights reserved. Clinically reviewed by the [facility] Transitions Program. Piggybackr [id number] - REV 01/24.  Substance Use Disorder: Care Instructions  Overview     You can improve your life and health by stopping your use of alcohol or drugs. When you don't drink or use drugs, you may feel and sleep better. You may  get along better with your family, friends, and coworkers. There are medicines and programs that can help with substance use disorder.  How can you care for yourself at home?  Here are some ways to help you stay sober and prevent relapse.  If you have been given medicine to help keep you sober or reduce your cravings, be sure to take it exactly as prescribed.  Talk to your doctor about programs that can help you stop using drugs or drinking alcohol.  Do not keep alcohol or drugs in your home.  Plan ahead. Think about what you'll say if other people ask you to drink or use drugs. Try not to spend time with people who drink or use drugs.  Use the time and money spent on drinking or drugs to do something that's important to you.  Preventing a relapse  Have a plan to deal with relapse. Learn to recognize changes in your thinking that lead you to drink or use drugs. Get help before you start to drink or use drugs again.  Try to stay away from situations, friends, or places that may lead you to drink or use drugs.  If you feel the need to drink alcohol or use drugs again, seek help right away. Call a trusted friend or family member. Some people get support from organizations such as Narcotics Anonymous or [employer] or from treatment facilities.  If you relapse, get help as soon as you can. Some people make a plan with another person that outlines what they want that person to do for them if they relapse. The plan usually includes how to handle the relapse and who to notify in case of relapse.  Don't give up. Remember that a relapse doesn't mean that you have failed. Use the experience to learn the triggers that lead you to drink or use drugs. Then quit again. Recovery is a lifelong process. Many people have several relapses before they are able to quit for good.  Follow-up care is a key part of your treatment and safety. Be sure to make and go to all appointments, and call your doctor if you are having problems. It's  "also a good idea to know your test results and keep a list of the medicines you take.  When should you call for help?   Call 911  anytime you think you may need emergency care. For example, call if you or someone else:    Has overdosed or has withdrawal signs. Be sure to tell the emergency workers that you are or someone else is using or trying to quit using drugs. Overdose or withdrawal signs may include:  Losing consciousness.  Seizure.  Seeing or hearing things that aren't there (hallucinations).     Is thinking or talking about suicide or harming others.   Where to get help 24 hours a day, 7 days a week   If you or someone you know talks about suicide, self-harm, a mental health crisis, a substance use crisis, or any other kind of emotional distress, get help right away. You can:    Call the Suicide and Crisis Lifeline at 988.     Call 8-092-711-TALK (1-169.378.8956).     Text HOME to 138730 to access the Crisis Text Line.   Consider saving these numbers in your phone.  Go to [employer] for more information or to chat online.  Call your doctor now or seek immediate medical care if:    You are having withdrawal symptoms. These may include nausea or vomiting, sweating, shakiness, and anxiety.   Watch closely for changes in your health, and be sure to contact your doctor if:    You have a relapse.     You need more help or support to stop.   Where can you learn more?  Go to https://www.[employer].net/patiented  Enter H573 in the search box to learn more about \"Substance Use Disorder: Care Instructions.\"  Current as of: August 20, 2024  Content Version: 14.5 2024-2025 [employer].   Care instructions adapted under license by your healthcare professional. If you have questions about a medical condition or this instruction, always ask your healthcare professional. [employer] disclaims any warranty or liability for your use of this information.       "

## 2025-06-25 NOTE — PROGRESS NOTES
"Preventive Care Visit  [facility]  DANIEL JAMSHID GREGORY DO, Family Medicine  Jun 25, 2025    Assessment & Plan     Routine general medical examination at a health care facility  Patient here for well exam. Declines pap. Despite age, if negative would recommend pap x2 before discontinuing after age 65.     Visit for screening mammogram  - MA Screening Bilateral w/ Dominic    Bilateral hand numbness  Chronic, not well controlled. Suspect carpal tunnel versus other peripheral neuropathy. Given numbness, concern for longstanding nerve damage so recommend referral to hand or sports medicine for EMG.   - Orthopedic  Referral    Essential hypertension  Chronic, stable. Will controlled on all agents. No side effects that patient notes.   - lisinopril-hydrochlorothiazide (ZESTORETIC) 20-12.5 MG tablet  Dispense: 180 tablet; Refill: 3  - amLODIPine (NORVASC) 5 MG tablet  Dispense: 90 tablet; Refill: 3      Patient has been advised of split billing requirements and indicates understanding: Yes        BMI  Estimated body mass index is 40.05 kg/m  as calculated from the following:    Height as of this encounter: 1.702 m (5' 7\").    Weight as of this encounter: 116 kg (255 lb 11.2 oz).   Weight management plan: Discussed healthy diet and exercise guidelines  Reviewed preventive health counseling, as reflected in patient instructions  Counseling  Appropriate preventive services were addressed with this patient via screening, questionnaire, or discussion as appropriate for fall prevention, nutrition, physical activity, Tobacco-use cessation, social engagement, weight loss and cognition.  Checklist reviewing preventive services available has been given to the patient.  Reviewed patient's diet, addressing concerns and/or questions.         Follow-up    Follow-up Visit   Expected date:  Jun 25, 2026 (Approximate)      Follow Up Appointment Details:     Follow-up with whom?: PCP    Follow-Up for what?: " Adult Preventive    How?: In Person                 Subjective   Licha is a 60 year old, presenting for the following:  Physical (Declined pap)        6/25/2025     7:51 AM   Additional Questions   Roomed by Michael WOLFF CMA          HPI     Patient had dizziness that went away on its own. Patient's blood pressure was normal through out that time.     Wrist Pain  -Bothering her for years, and over the last several months getting worst.   -In the past would dissipate within an hour of waking up with hand exercises.   -Bilateral, tends to effect which side she sleeps on. Really localized to the wrist.   -Symptoms of numbness and tingling of some fingers, sometimes the whole hand  -Denies loss of strength, but rather the lack of sensation.  -Braces provide minimal relief.   -Patient types for work, she also does [employer].     Advance Care Planning  Discussed advance care planning with patient; informed AVS has link to [employer].        6/22/2025   General Health   How would you rate your overall physical health? Good   Feel stress (tense, anxious, or unable to sleep) Only a little   (!) STRESS CONCERN      6/22/2025   Nutrition   Three or more servings of calcium each day? Yes   Diet: Regular (no restrictions)   How many servings of fruit and vegetables per day? 4 or more   How many sweetened beverages each day? 0-1         6/22/2025   Exercise   Days per week of moderate/strenous exercise 4 days   Average minutes spent exercising at this level 20 min         6/22/2025   Social Factors   Frequency of gathering with friends or relatives More than three times a week   Worry food won't last until get money to buy more No   Food not last or not have enough money for food? No   Do you have housing? (Housing is defined as stable permanent housing and does not include staying outside in a car, in a tent, in an abandoned building, in an overnight shelter, or couch-surfing.) Yes   Are you worried about losing your housing?  No   Lack of transportation? No   Unable to get utilities (heat,electricity)? No         2025   Fall Risk   Fallen 2 or more times in the past year? No   Trouble with walking or balance? No          2025   Dental   Dentist two times every year? Yes         Today's PHQ-2 Score:       2025     7:48 AM   PHQ-2 (  [employer])   Q1: Little interest or pleasure in doing things 0   Q2: Feeling down, depressed or hopeless 0   PHQ-2 Score 0    Q1: Little interest or pleasure in doing things Not at all   Q2: Feeling down, depressed or hopeless Not at all   PHQ-2 Score 0       Patient-reported           2025   Substance Use   Alcohol more than 3/day or more than 7/wk No   Do you use any other substances recreationally? (!) CANNABIS PRODUCTS     Social History     Tobacco Use    Smoking status: Former     Current packs/day: 0.00     Average packs/day: 0.5 packs/day for 35.0 years (17.5 ttl pk-yrs)     Types: Cigarettes     Start date: 10/1/1984     Quit date: 10/1/2019     Years since quittin.7    Smokeless tobacco: Never    Tobacco comments:     Has smoked on and off over the 35 years    Substance Use Topics    Alcohol use: Yes     Comment: Socially    Drug use: Never           2023   LAST FHS-7 RESULTS   1st degree relative breast or ovarian cancer Yes    Any relative bilateral breast cancer Unknown    Any male have breast cancer No    Any ONE woman have BOTH breast AND ovarian cancer Yes    Any woman with breast cancer before 50yrs No    2 or more relatives with breast AND/OR ovarian cancer Yes    2 or more relatives with breast AND/OR bowel cancer Unknown        Proxy-reported     Mammogram Screening - Mammogram every 1-2 years updated in Health Maintenance based on mutual decision making          2025   One time HIV Screening   Previous HIV test? No         2025   STI Screening   New sexual partner(s) since last STI/HIV test? No     History of abnormal Pap smear: No - age 30- 64 PAP  "with HPV every 5 years recommended       ASCVD Risk   The 10-year ASCVD risk score (Bill MCGEE, et al., 2019) is: 4.2%    Values used to calculate the score:      Age: 60 years      Sex: Female      Is Non- : No      Diabetic: No      Tobacco smoker: No      Systolic Blood Pressure: 110 mmHg      Is BP treated: Yes      HDL Cholesterol: 43 mg/dL      Total Cholesterol: 196 mg/dL    Reviewed and updated as needed this visit by Provider   Tobacco  Allergies  Meds  Problems  Med Hx  Surg Hx  Fam Hx            Past Medical History:   Diagnosis Date    Hypertension 10/2019    Osteoarthritis of left hip      Past Surgical History:   Procedure Laterality Date    ARTHROPLASTY HIP Left 10/25/2019    Procedure: Total Left Hip Arthroplasty;  Surgeon: Rashid Schuster MD;  Location: [facility] OR    ARTHROPLASTY HIP Right 1/17/2020    Procedure: Right total hip arthroplasty;  Surgeon: Rashid Schuster MD;  Location: [facility] OR    ENT SURGERY      right mandible surgery    KNEE SURGERY      TONSILLECTOMY      as a child         Review of Systems  Constitutional, neuro, ENT, endocrine, pulmonary, cardiac, gastrointestinal, genitourinary, musculoskeletal, integument and psychiatric systems are negative, except as otherwise noted.     Objective    Exam  /80 (BP Location: Right arm, Patient Position: Sitting, Cuff Size: Adult Large)   Pulse 85   Temp 98.1  F (36.7  C) (Oral)   Resp 20   Ht 1.702 m (5' 7\")   Wt 116 kg (255 lb 11.2 oz)   LMP  (LMP Unknown)   SpO2 95%   BMI 40.05 kg/m     Estimated body mass index is 40.05 kg/m  as calculated from the following:    Height as of this encounter: 1.702 m (5' 7\").    Weight as of this encounter: 116 kg (255 lb 11.2 oz).    Physical Exam  GENERAL: alert and no distress  EYES: Eyes grossly normal to inspection, PERRL and conjunctivae and sclerae normal  HENT: ear canals and TM's normal, nose and mouth without ulcers or lesions  NECK: no adenopathy, no " asymmetry, masses, or scars  RESP: lungs clear to auscultation - no rales, rhonchi or wheezes  CV: regular rate and rhythm, normal S1 S2, no S3 or S4, no murmur, click or rub, no peripheral edema  ABDOMEN: soft, nontender, no hepatosplenomegaly, no masses and bowel sounds normal  MS: no gross musculoskeletal defects noted, no edema  SKIN: no suspicious lesions or rashes        Signed Electronically by: DANIEL GREGORY,

## 2025-06-26 ENCOUNTER — PATIENT OUTREACH (OUTPATIENT)
Dept: CARE COORDINATION | Facility: CLINIC | Age: 60
End: 2025-06-26
Payer: COMMERCIAL

## 2025-06-30 ENCOUNTER — PATIENT OUTREACH (OUTPATIENT)
Dept: CARE COORDINATION | Facility: CLINIC | Age: 60
End: 2025-06-30
Payer: COMMERCIAL

## 2025-06-30 NOTE — TELEPHONE ENCOUNTER
DIAGNOSIS: Bilateral hand numbness [R20.0]/ Jade Santoyo DO in [facility] FAMILY [facility]/OB    APPOINTMENT DATE: 7/2/25   NOTES STATUS DETAILS   OFFICE NOTE from referring provider Internal 6/25/25 - Jade Santoyo DO - [facility]

## 2025-07-01 NOTE — H&P (VIEW-ONLY)
Chief Complaint:   Chief Complaint   Patient presents with    Consult     Bilateral hand numbness and tingling        Referring Physician: Jade Santoyo    Diagnosis: Bilateral carpal tunnel  Treatment: Home exercises, bracing    History of Present Illness: Licha Birch is a 60 year old RHD female presenting for evaluation of bilateral hand numbness and tingling.  No associated pain.  Denies constant numbness or tingling.  She states that this has been present for years but worsened over the winter. She has used nocturnal bracing in the past with only slight improvement in symptoms.  No prior corticosteroid injections.  No prior surgeries or injuries to the bilateral upper extremities.    Clinical documentation by Dr. Santoyo on 6/25/2025 was reviewed.    Occupation: Works in [employer], types quite a lot enjoys [employer]    Past Medical History:   Past Medical History:   Diagnosis Date    Hypertension 10/2019    Osteoarthritis of left hip        Past Surgical History:   Past Surgical History:   Procedure Laterality Date    ARTHROPLASTY HIP Left 10/25/2019    Procedure: Total Left Hip Arthroplasty;  Surgeon: Rashid Schuster MD;  [facility]    ARTHROPLASTY HIP Right 1/17/2020    Procedure: Right total hip arthroplasty;  Surgeon: Rashid Schuster MD;  Location: [facility]    ENT SURGERY      right mandible surgery    KNEE SURGERY      TONSILLECTOMY      as a child       Medications:   Current Outpatient Medications:     amLODIPine (NORVASC) 5 MG tablet, Take 1 tablet (5 mg) by mouth daily., Disp: 90 tablet, Rfl: 3    glucosamine 500 MG CAPS, Take 1 tablet by mouth At Bedtime Glucosamine with tumeric, Disp: , Rfl:     ibuprofen (ADVIL/MOTRIN) 600 MG tablet, TAKE 1 TABLET EVERY EVERY 6-8 HOURS AS NEEDED PAIN., Disp: , Rfl:     lisinopril-hydrochlorothiazide (ZESTORETIC) 20-12.5 MG tablet, Take 2 tablets by mouth daily., Disp: 180 tablet, Rfl: 3    magnesium oxide 400 MG CAPS, Take 1 capsule by mouth daily., Disp:  "90 capsule, Rfl: 1    Allergy:   Allergies   Allergen Reactions    Seasonal Allergies        Social History:   History   Smoking Status    Former    Types: Cigarettes   Smokeless Tobacco    Never      Social History     Tobacco Use    Smoking status: Former     Current packs/day: 0.00     Average packs/day: 0.5 packs/day for 35.0 years (17.5 ttl pk-yrs)     Types: Cigarettes     Start date: 10/1/1984     Quit date: 10/1/2019     Years since quittin.7    Smokeless tobacco: Never    Tobacco comments:     Has smoked on and off over the 35 years    Substance Use Topics    Alcohol use: Yes     Comment: Socially    Drug use: Never        Family History:   Family History   Problem Relation Age of Onset    Unknown/Adopted Mother     Hypertension Mother     Osteoporosis Mother     Thyroid Disease Mother     Breast Cancer Mother     Hyperlipidemia Mother     Atrial fibrillation Mother     Osteoarthritis Mother     Myocardial Infarction Father     Heart Disease Father        Physical Examination:  Vitals:    25 1521   Weight: 115.7 kg (255 lb)   Height: 1.702 m (5' 7\")     Body mass index is 39.94 kg/m .    Well appearing, well nourished  Alert and oriented x 3, cooperative with exam     Right Upper Extremity:  Thenar atrophy: no   Tinel's sign (volar wrist): positive   Phalen test: positive   Durkan's test: positive   Lacertus compression: negative   Hypothenar atrophy: no   Tinel's sign (medial elbow): negative   Elbow flexion test: negative   Motor Exam:   Abductor pollicis brevis strength: 5/5    Flexor pollicis longus strength: 5/5    1st dorsal interosseous strength: 5/5    Intact thumbs up  Sensory Exam:   Decreased sensation to light touch in volar IF (median).  Sensation intact to light touch in FDWS (radial), volar SF (ulnar).    Two Point Discrimination:   IF 6, SF 5  Vascular Exam: fingers warm, well perfused    Left Upper Extremity:  Thenar atrophy: no   Tinel's sign (volar wrist): positive   Phalen " test: positive   Durkan's test: positive   Lacertus compression: negative   Hypothenar atrophy: no   Tinel's sign (medial elbow): negative   Elbow flexion test: negative   Motor Exam:   Abductor pollicis brevis strength: 5/5    Flexor pollicis longus strength: 5/5    1st dorsal interosseous strength: 5/5    Intact thumbs up  Sensory Exam:   Decreased sensation to light touch in volar IF (median).  Sensation intact to light touch in FDWS (radial), volar SF (ulnar).    Two Point Discrimination:   Thumb IF 5, SF 5  Vascular Exam: fingers warm, well perfused    Imaging/Studies:  None    Numbness predominantly or exclusively in the median nerve territory: Yes (3.5 points)  Nocturnal numbness: Yes (4 points)  Thenar atrophy/weakness: No (5 points)  Positive Phalen's test:Yes (5 points)  Loss of 2 point discrimination at 5mm: No (4.5 points)  Positive Tinel sign: Yes (4 points)    Total: 16.5 points (>12 = 80% probability of carpal tunnel syndrome)    Assessment: Licha Birch is a 60 year old RHD female with bilateral carpal tunnel..    Plan:   I had a discussion with the patient regarding my clinical findings, diagnosis, and treatment plan.  I reviewed the treatment options for compressive neuropathy (observation, bracing, steroid injection, occupational therapy for nerve glide exercises, nerve decompression) as well as the risks and benefits of each.  The patient elects surgery.  I reviewed with the patient the treatment course including surgical plan, post-operative pain management, follow-up frequency, and rehabilitation plan.  The patient understands that there are risks associated with operative treatment including but not limited to infection, bleeding, nerve injury, permanent numbness, recurrence of symptoms, post-operative stiffness, surgical scar, CRPS, anesthetic complications, etc.  We also discussed that there is a possibility that the surgery will not be successful and will require repeat surgery. The patient  understands and agrees to the treatment plan.  All questions answered.      Plan for operative intervention in the form of right carpal tunnel release under local    Next Visit:   Follow-up: 10-14 days after surgery   Imaging: None.   Plan: wound check    Enedina Schulte MD   Orthopaedic surgery, PGY4    I have seen and evaluated this patient myself and agree with the documentation  ERIN HURT MD

## 2025-07-01 NOTE — PROGRESS NOTES
Chief Complaint:   Chief Complaint   Patient presents with    Consult     Bilateral hand numbness and tingling        Referring Physician: Jade Santoyo    Diagnosis: Bilateral carpal tunnel  Treatment: Home exercises, bracing    History of Present Illness: Licha Birch is a 60 year old RHD female presenting for evaluation of bilateral hand numbness and tingling.  No associated pain.  Denies constant numbness or tingling.  She states that this has been present for years but worsened over the winter. She has used nocturnal bracing in the past with only slight improvement in symptoms.  No prior corticosteroid injections.  No prior surgeries or injuries to the bilateral upper extremities.    Clinical documentation by Dr. Santoyo on 6/25/2025 was reviewed.    Occupation: Works in [employer], types quite a lot enjoys [employer]    Past Medical History:   Past Medical History:   Diagnosis Date    Hypertension 10/2019    Osteoarthritis of left hip        Past Surgical History:   Past Surgical History:   Procedure Laterality Date    ARTHROPLASTY HIP Left 10/25/2019    Procedure: Total Left Hip Arthroplasty;  Surgeon: Rashid Schuster MD;  [facility]    ARTHROPLASTY HIP Right 1/17/2020    Procedure: Right total hip arthroplasty;  Surgeon: Rashid Schuster MD;  [facility]    ENT SURGERY      right mandible surgery    KNEE SURGERY      TONSILLECTOMY      as a child       Medications:   Current Outpatient Medications:     amLODIPine (NORVASC) 5 MG tablet, Take 1 tablet (5 mg) by mouth daily., Disp: 90 tablet, Rfl: 3    glucosamine 500 MG CAPS, Take 1 tablet by mouth At Bedtime Glucosamine with tumeric, Disp: , Rfl:     ibuprofen (ADVIL/MOTRIN) 600 MG tablet, TAKE 1 TABLET EVERY EVERY 6-8 HOURS AS NEEDED PAIN., Disp: , Rfl:     lisinopril-hydrochlorothiazide (ZESTORETIC) 20-12.5 MG tablet, Take 2 tablets by mouth daily., Disp: 180 tablet, Rfl: 3    magnesium oxide 400 MG CAPS, Take 1 capsule by mouth daily., Disp:  "90 capsule, Rfl: 1    Allergy:   Allergies   Allergen Reactions    Seasonal Allergies        Social History:   History   Smoking Status    Former    Types: Cigarettes   Smokeless Tobacco    Never      Social History     Tobacco Use    Smoking status: Former     Current packs/day: 0.00     Average packs/day: 0.5 packs/day for 35.0 years (17.5 ttl pk-yrs)     Types: Cigarettes     Start date: 10/1/1984     Quit date: 10/1/2019     Years since quittin.7    Smokeless tobacco: Never    Tobacco comments:     Has smoked on and off over the 35 years    Substance Use Topics    Alcohol use: Yes     Comment: Socially    Drug use: Never        Family History:   Family History   Problem Relation Age of Onset    Unknown/Adopted Mother     Hypertension Mother     Osteoporosis Mother     Thyroid Disease Mother     Breast Cancer Mother     Hyperlipidemia Mother     Atrial fibrillation Mother     Osteoarthritis Mother     Myocardial Infarction Father     Heart Disease Father        Physical Examination:  Vitals:    25 1521   Weight: 115.7 kg (255 lb)   Height: 1.702 m (5' 7\")     Body mass index is 39.94 kg/m .    Well appearing, well nourished  Alert and oriented x 3, cooperative with exam     Right Upper Extremity:  Thenar atrophy: no   Tinel's sign (volar wrist): positive   Phalen test: positive   Durkan's test: positive   Lacertus compression: negative   Hypothenar atrophy: no   Tinel's sign (medial elbow): negative   Elbow flexion test: negative   Motor Exam:   Abductor pollicis brevis strength: 5/5    Flexor pollicis longus strength: 5/5    1st dorsal interosseous strength: 5/5    Intact thumbs up  Sensory Exam:   Decreased sensation to light touch in volar IF (median).  Sensation intact to light touch in FDWS (radial), volar SF (ulnar).    Two Point Discrimination:   IF 6, SF 5  Vascular Exam: fingers warm, well perfused    Left Upper Extremity:  Thenar atrophy: no   Tinel's sign (volar wrist): positive   Phalen " test: positive   Durkan's test: positive   Lacertus compression: negative   Hypothenar atrophy: no   Tinel's sign (medial elbow): negative   Elbow flexion test: negative   Motor Exam:   Abductor pollicis brevis strength: 5/5    Flexor pollicis longus strength: 5/5    1st dorsal interosseous strength: 5/5    Intact thumbs up  Sensory Exam:   Decreased sensation to light touch in volar IF (median).  Sensation intact to light touch in FDWS (radial), volar SF (ulnar).    Two Point Discrimination:   Thumb IF 5, SF 5  Vascular Exam: fingers warm, well perfused    Imaging/Studies:  None    Numbness predominantly or exclusively in the median nerve territory: Yes (3.5 points)  Nocturnal numbness: Yes (4 points)  Thenar atrophy/weakness: No (5 points)  Positive Phalen's test:Yes (5 points)  Loss of 2 point discrimination at 5mm: No (4.5 points)  Positive Tinel sign: Yes (4 points)    Total: 16.5 points (>12 = 80% probability of carpal tunnel syndrome)    Assessment: Licha Birch is a 60 year old RHD female with bilateral carpal tunnel..    Plan:   I had a discussion with the patient regarding my clinical findings, diagnosis, and treatment plan.  I reviewed the treatment options for compressive neuropathy (observation, bracing, steroid injection, occupational therapy for nerve glide exercises, nerve decompression) as well as the risks and benefits of each.  The patient elects surgery.  I reviewed with the patient the treatment course including surgical plan, post-operative pain management, follow-up frequency, and rehabilitation plan.  The patient understands that there are risks associated with operative treatment including but not limited to infection, bleeding, nerve injury, permanent numbness, recurrence of symptoms, post-operative stiffness, surgical scar, CRPS, anesthetic complications, etc.  We also discussed that there is a possibility that the surgery will not be successful and will require repeat surgery. The patient  understands and agrees to the treatment plan.  All questions answered.      Plan for operative intervention in the form of right carpal tunnel release under local    Next Visit:   Follow-up: 10-14 days after surgery   Imaging: None.   Plan: wound check    Enedina Schulte MD   Orthopaedic surgery, PGY4    I have seen and evaluated this patient myself and agree with the documentation  ERIN HURT MD

## 2025-07-02 ENCOUNTER — OFFICE VISIT (OUTPATIENT)
Dept: ORTHOPEDICS | Facility: CLINIC | Age: 60
End: 2025-07-02
Attending: FAMILY MEDICINE
Payer: COMMERCIAL

## 2025-07-02 ENCOUNTER — PRE VISIT (OUTPATIENT)
Dept: ORTHOPEDICS | Facility: CLINIC | Age: 60
End: 2025-07-02

## 2025-07-02 VITALS — HEIGHT: 67 IN | BODY MASS INDEX: 40.02 KG/M2 | WEIGHT: 255 LBS

## 2025-07-02 DIAGNOSIS — R20.0 BILATERAL HAND NUMBNESS: ICD-10-CM

## 2025-07-02 PROCEDURE — 99204 OFFICE O/P NEW MOD 45 MIN: CPT | Mod: GC | Performed by: STUDENT IN AN ORGANIZED HEALTH CARE EDUCATION/TRAINING PROGRAM

## 2025-07-02 NOTE — NURSING NOTE
"Reason For Visit:   Chief Complaint   Patient presents with    Consult     Bilateral hand numbness and tingling        Primary MD: Jade Santoyo  Ref. MD: Jenna    Age: 60 year old    ?  No      Ht 1.702 m (5' 7\")   Wt 115.7 kg (255 lb)   LMP  (LMP Unknown)   BMI 39.94 kg/m        Pain Assessment  Patient Currently in Pain: Yes  Primary Pain Location: Hand (Bilateral)  Pain Descriptors: Numbness, Tingling, Constant    Hand Dominance Evaluation  Hand Dominance: Right          QuickDASH Assessment      7/2/2025     5:54 AM   QuickDASH Main   1. Open a tight or new jar Mild difficulty   2. Do heavy household chores (e.g., wash walls, floors) Mild difficulty   3. Carry a shopping bag or briefcase Mild difficulty   4. Wash your back Moderate difficulty   5. Use a knife to cut food No difficulty   6. Recreational activities in which you take some force or impact through your arm, shoulder or hand (e.g., golf, hammering, tennis, etc.) Mild difficulty   7. During the past week, to what extent has your arm, shoulder or hand problem interfered with your normal social activities with family, friends, neighbours or groups Slightly   8. During the past week, were you limited in your work or other regular daily activities as a result of your arm, shoulder or hand problem Not limited at all   9. Arm, shoulder or hand pain Mild   10.Tingling (pins and needles) in your arm,shoulder or hand Severe   11. During the past week, how much difficulty have you had sleeping because of the pain in your arm, shoulder or hand Moderate difficulty   Quickdash Ability Score 29.55          Current Outpatient Medications   Medication Sig Dispense Refill    amLODIPine (NORVASC) 5 MG tablet Take 1 tablet (5 mg) by mouth daily. 90 tablet 3    glucosamine 500 MG CAPS Take 1 tablet by mouth At Bedtime Glucosamine with tumeric      ibuprofen (ADVIL/MOTRIN) 600 MG tablet TAKE 1 TABLET EVERY EVERY 6-8 HOURS AS NEEDED PAIN.      " lisinopril-hydrochlorothiazide (ZESTORETIC) 20-12.5 MG tablet Take 2 tablets by mouth daily. 180 tablet 3    magnesium oxide 400 MG CAPS Take 1 capsule by mouth daily. 90 capsule 1       Allergies   Allergen Reactions    Seasonal Allergies        Courtney Goodman, ATC

## 2025-07-02 NOTE — NURSING NOTE
Teaching Flowsheet     Visit Type: In Clinic    Time Start: 1357  Time End: 1607  Total Time Spent: 10 min.    Surgeon: Dr Ml Valerio  Location of Surgery (known or anticipated): [facility]   Type of Anesthesia: Local  Worker's Compensation Procedure: No    Pertinent Medical History: HTN, Irregular heartbeat, S/P left total hip arthroplasty  Were medical conditions reviewed and appropriate for location? Yes  BMI: Obesity Grade II BMI 35-39.9 (39.94)    Relevant Diagnosis: Bilateral hand numbness, carpal tunnel syndrome on right  Teaching Topic: Right open carpal tunnel release.    Person(s) involved in teaching:   Patient  : No.   Verified Patient's Phone Number: NO    Caregiver//  Name: N/A    Motivation Level:  Asks Questions: Yes  Eager to Learn: Yes  Cooperative: Yes  Receptive (willing/able to accept information): Yes  Any cultural factors/Denominational beliefs that may influence understanding or compliance? No     Patient demonstrates understanding of the following:  Reason for the appointment, diagnosis and treatment plan: Yes  Knowledge of proper use of medications and conditions for which they are ordered (with special attention to potential side effects or drug interactions): Yes  Which situations necessitate calling provider and whom to contact: Yes     Teaching Concerns Addressed:   Proper use and care of medical equip, care aids, etc.: Yes  Nutritional needs and diet plan: Yes  Pain management techniques: Yes  Wound Care: Yes  How and/when to access community resources: Yes  Need for pre-op with in 30 days: N/A     Does patient have difficulty getting a ride to appointments (post-ops, PT/OT): No  Patient's plan after discharge: home with family or spouse     Instructional Materials Used/Given: Preoperative surgery packet, 2 bottles of antibacterial Chlorhexidine soap. Stop Light Tool reviewed, after-hours number provided, patient verbalized understanding, had no immediate  questions.    - Important Contact Info/Phone Numbers: emphasizing clinic number [phone] and after hours number [phone]  - Map/location of surgery and follow-up appointments  - Showering instructions  - Stoplight Tool     -Next step: schedule a surgery date.    Morena Palafox RN

## 2025-07-02 NOTE — LETTER
7/2/2025      Licha Birch  [address] [address]      Dear Colleague,    Thank you for referring your patient, Licha Birch, to the [facility] [place]. Please see a copy of my visit note below.    Chief Complaint:   Chief Complaint   Patient presents with     Consult     Bilateral hand numbness and tingling        Referring Physician: Jade Santoyo    Diagnosis: Bilateral carpal tunnel  Treatment: Home exercises, bracing    History of Present Illness: Licha Birch is a 60 year old RHD female presenting for evaluation of bilateral hand numbness and tingling.  No associated pain.  Denies constant numbness or tingling.  She states that this has been present for years but worsened over the winter. She has used nocturnal bracing in the past with only slight improvement in symptoms.  No prior corticosteroid injections.  No prior surgeries or injuries to the bilateral upper extremities.    Clinical documentation by Dr. Santoyo on 6/25/2025 was reviewed.    Occupation: Works in [employer], types quite a lot enjoys [employer]    Past Medical History:   Past Medical History:   Diagnosis Date     Hypertension 10/2019     Osteoarthritis of left hip        Past Surgical History:   Past Surgical History:   Procedure Laterality Date     ARTHROPLASTY HIP Left 10/25/2019    Procedure: Total Left Hip Arthroplasty;  Surgeon: Rashid Schuster MD;  [facility]     ARTHROPLASTY HIP Right 1/17/2020    Procedure: Right total hip arthroplasty;  Surgeon: Rashid Schuster MD;  Location: [facility]     ENT SURGERY      right mandible surgery     KNEE SURGERY       TONSILLECTOMY      as a child       Medications:   Current Outpatient Medications:      amLODIPine (NORVASC) 5 MG tablet, Take 1 tablet (5 mg) by mouth daily., Disp: 90 tablet, Rfl: 3     glucosamine 500 MG CAPS, Take 1 tablet by mouth At Bedtime Glucosamine with tumeric, Disp: , Rfl:      ibuprofen (ADVIL/MOTRIN) 600 MG tablet, TAKE 1  "TABLET EVERY EVERY 6-8 HOURS AS NEEDED PAIN., Disp: , Rfl:      lisinopril-hydrochlorothiazide (ZESTORETIC) 20-12.5 MG tablet, Take 2 tablets by mouth daily., Disp: 180 tablet, Rfl: 3     magnesium oxide 400 MG CAPS, Take 1 capsule by mouth daily., Disp: 90 capsule, Rfl: 1    Allergy:   Allergies   Allergen Reactions     Seasonal Allergies        Social History:   History   Smoking Status     Former     Types: Cigarettes   Smokeless Tobacco     Never      Social History     Tobacco Use     Smoking status: Former     Current packs/day: 0.00     Average packs/day: 0.5 packs/day for 35.0 years (17.5 ttl pk-yrs)     Types: Cigarettes     Start date: 10/1/1984     Quit date: 10/1/2019     Years since quittin.7     Smokeless tobacco: Never     Tobacco comments:     Has smoked on and off over the 35 years    Substance Use Topics     Alcohol use: Yes     Comment: Socially     Drug use: Never        Family History:   Family History   Problem Relation Age of Onset     Unknown/Adopted Mother      Hypertension Mother      Osteoporosis Mother      Thyroid Disease Mother      Breast Cancer Mother      Hyperlipidemia Mother      Atrial fibrillation Mother      Osteoarthritis Mother      Myocardial Infarction Father      Heart Disease Father        Physical Examination:  Vitals:    25 1521   Weight: 115.7 kg (255 lb)   Height: 1.702 m (5' 7\")     Body mass index is 39.94 kg/m .    Well appearing, well nourished  Alert and oriented x 3, cooperative with exam     Right Upper Extremity:  Thenar atrophy: no   Tinel's sign (volar wrist): positive   Phalen test: positive   Durkan's test: positive   Lacertus compression: negative   Hypothenar atrophy: no   Tinel's sign (medial elbow): negative   Elbow flexion test: negative   Motor Exam:    Abductor pollicis brevis strength: 5/5     Flexor pollicis longus strength: 5/5     1st dorsal interosseous strength: 5/5     Intact thumbs up  Sensory Exam:    Decreased sensation to light " touch in volar IF (median).  Sensation intact to light touch in FDWS (radial), volar SF (ulnar).     Two Point Discrimination:   IF 6, SF 5  Vascular Exam: fingers warm, well perfused    Left Upper Extremity:  Thenar atrophy: no   Tinel's sign (volar wrist): positive   Phalen test: positive   Durkan's test: positive   Lacertus compression: negative   Hypothenar atrophy: no   Tinel's sign (medial elbow): negative   Elbow flexion test: negative   Motor Exam:    Abductor pollicis brevis strength: 5/5     Flexor pollicis longus strength: 5/5     1st dorsal interosseous strength: 5/5     Intact thumbs up  Sensory Exam:    Decreased sensation to light touch in volar IF (median).  Sensation intact to light touch in FDWS (radial), volar SF (ulnar).     Two Point Discrimination:   Thumb IF 5, SF 5  Vascular Exam: fingers warm, well perfused    Imaging/Studies:  None    Numbness predominantly or exclusively in the median nerve territory: Yes (3.5 points)  Nocturnal numbness: Yes (4 points)  Thenar atrophy/weakness: No (5 points)  Positive Phalen's test:Yes (5 points)  Loss of 2 point discrimination at 5mm: No (4.5 points)  Positive Tinel sign: Yes (4 points)    Total: 16.5 points (>12 = 80% probability of carpal tunnel syndrome)    Assessment: Licha Birch is a 60 year old RHD female with bilateral carpal tunnel..    Plan:   I had a discussion with the patient regarding my clinical findings, diagnosis, and treatment plan.  I reviewed the treatment options for compressive neuropathy (observation, bracing, steroid injection, occupational therapy for nerve glide exercises, nerve decompression) as well as the risks and benefits of each.  The patient elects surgery.  I reviewed with the patient the treatment course including surgical plan, post-operative pain management, follow-up frequency, and rehabilitation plan.  The patient understands that there are risks associated with operative treatment including but not limited to  infection, bleeding, nerve injury, permanent numbness, recurrence of symptoms, post-operative stiffness, surgical scar, CRPS, anesthetic complications, etc.  We also discussed that there is a possibility that the surgery will not be successful and will require repeat surgery. The patient understands and agrees to the treatment plan.  All questions answered.       Plan for operative intervention in the form of right carpal tunnel release under local    Next Visit:    Follow-up: 10-14 days after surgery    Imaging: None.    Plan: wound check    Enedina Schulte MD   Orthopaedic surgery, PGY4    I have seen and evaluated this patient myself and agree with the documentation  ERIN HURT MD      Again, thank you for allowing me to participate in the care of your patient.        Sincerely,        ERIN HURT MD    Electronically signed

## 2025-07-03 ENCOUNTER — TELEPHONE (OUTPATIENT)
Dept: ORTHOPEDICS | Facility: CLINIC | Age: 60
End: 2025-07-03
Payer: COMMERCIAL

## 2025-07-03 PROBLEM — R20.0 BILATERAL HAND NUMBNESS: Status: ACTIVE | Noted: 2025-07-02

## 2025-07-03 NOTE — TELEPHONE ENCOUNTER
Outgoing call to schedule surgery with Dr. Valerio    Spoke with: Licha (patient)    Reason for Surgical Date: Next Available on a Wednesday    Date of Surgery: 7/16/2025    Estimated Arrival time Discussed with Patient:  No. Patient asked and informed them that Dr. Valerio does surgeries in the morning before noon, but as for the exact arrival time and time the surgery will occur, they will receive a call with that information closer to the surgery date .    Location of surgery: [facility]     Pre-Op H&P: N/A - Local Only    Imaging: No     Additional Pre-Op Appointments: Not Applicable     Post-Op Appt Date w/ NP/PA:  Sandra Thomas PA-C  7/23/2025 at 4:00 PM    Additional Post-Op Appointments: No     Discussed with patient pre-op RN will call 2-3 days prior to surgery with arrival time and instructions:  Yes     Standard Surgery Packet Sent: Yes 07/03/25  via Received in clinic by 7/2/2025       Additional Comments: N/A      Felice Cote on 7/3/2025 at 8:57 AM

## 2025-07-14 ENCOUNTER — TELEPHONE (OUTPATIENT)
Dept: ORTHOPEDICS | Facility: CLINIC | Age: 60
End: 2025-07-14
Payer: COMMERCIAL

## 2025-07-14 NOTE — TELEPHONE ENCOUNTER
[id number] Spoke with patient. She is having a right open carpal tunnel release under local only anesthetic on Wednesday 7-16-25 with Dr Valerio.   Discussed her questions, including taking all medications, (not on blood thinner or GLP1) eating and drinking is OK prior to surgery. Avoid Alcohol, smoking, CBD for 24 hrs prior to and after surgery to avoid delay in healing. Discussed weight limit lifting, pushing, pulling less than 1 pound after surgery.  Keep dressings clean and dry until 1st post op.  No need for antibiotic prophylaxis after her hip replacements since it's a sterile procedure. Encouraged patient to please call clinic for any further questions/concerns. Morena Palafox RN

## 2025-07-14 NOTE — TELEPHONE ENCOUNTER
ATTN: ZEESHAN [place]    Pt has some pre surgery questions. Please CALL pt back to discuss. Thank you.

## 2025-07-16 ENCOUNTER — HOSPITAL ENCOUNTER (OUTPATIENT)
Facility: AMBULATORY SURGERY CENTER | Age: 60
Discharge: HOME | End: 2025-07-16
Attending: STUDENT IN AN ORGANIZED HEALTH CARE EDUCATION/TRAINING PROGRAM | Admitting: STUDENT IN AN ORGANIZED HEALTH CARE EDUCATION/TRAINING PROGRAM
Payer: COMMERCIAL

## 2025-07-16 VITALS
WEIGHT: 255 LBS | RESPIRATION RATE: 16 BRPM | HEART RATE: 63 BPM | TEMPERATURE: 97.2 F | SYSTOLIC BLOOD PRESSURE: 113 MMHG | HEIGHT: 67 IN | DIASTOLIC BLOOD PRESSURE: 70 MMHG | OXYGEN SATURATION: 97 % | BODY MASS INDEX: 40.02 KG/M2

## 2025-07-16 DIAGNOSIS — R20.0 BILATERAL HAND NUMBNESS: Primary | ICD-10-CM

## 2025-07-16 PROCEDURE — 64721 CARPAL TUNNEL SURGERY: CPT | Mod: RT | Performed by: STUDENT IN AN ORGANIZED HEALTH CARE EDUCATION/TRAINING PROGRAM

## 2025-07-16 RX ORDER — LORATADINE 10 MG/1
10 TABLET ORAL DAILY PRN
COMMUNITY

## 2025-07-16 RX ORDER — OXYCODONE HYDROCHLORIDE 5 MG/1
5 TABLET ORAL EVERY 6 HOURS PRN
Qty: 5 TABLET | Refills: 0 | Status: SHIPPED | OUTPATIENT
Start: 2025-07-16 | End: 2025-07-19

## 2025-07-16 RX ORDER — MAGNESIUM HYDROXIDE 1200 MG/15ML
LIQUID ORAL PRN
Status: DISCONTINUED | OUTPATIENT
Start: 2025-07-16 | End: 2025-07-16 | Stop reason: HOSPADM

## 2025-07-16 RX ORDER — LIDOCAINE HYDROCHLORIDE AND EPINEPHRINE 10; 10 MG/ML; UG/ML
20 INJECTION, SOLUTION INFILTRATION; PERINEURAL ONCE
Status: COMPLETED | OUTPATIENT
Start: 2025-07-16 | End: 2025-07-16

## 2025-07-16 RX ADMIN — LIDOCAINE HYDROCHLORIDE AND EPINEPHRINE 20 ML: 10; 10 INJECTION, SOLUTION INFILTRATION; PERINEURAL at 09:03

## 2025-07-16 NOTE — OP NOTE
OPERATIVE REPORT    PATIENT NAME: Licha Birch  MRN: [id number]    DATE: 7/16/2025    PREOPERATIVE DIAGNOSIS:   1. Right wrist carpal tunnel syndrome.    POSTOPERATIVE DIAGNOSIS:   1. Right wrist carpal tunnel syndrome.    OPERATION:   1. Right wrist carpal tunnel release.     SURGEON: Ml Valerio MD     STAFF: Circulator: Cristiana Miranda RN  Scrub Person: SaenzLetty    ANESTHESIA:  Local    IMPLANT(S): * No implants in log *    SPECIMEN: * No specimens in log *    ESTIMATED BLOOD LOSS: < 5 mL.    FLUIDS: See anesthesia records.     URINE OUTPUT: Not applicable.  Reynolds was not placed.     COMPLICATIONS: None.     INDICATIONS: Licha Birch is a 60 year old female who developed right carpal tunnel syndrome. The patient did not respond to conservative management and was therefore indicated for operative intervention. The risks, benefits, and alternatives were discussed with the patient.  The patient verbalized understanding of the treatment plan and an informed consent was signed.     DESCRIPTION OF PROCEDURE: The surgical site was prepped with alcohol and anesthetized with 20ml of 1% lidocaine with 1:100,000 epinephrine. The patient was taken to the operating room and placed in supine position on the operating table.  The right upper extremity was then prepped and draped in the usual sterile fashion.  A timeout was performed with all OR staff.  The patient name, MRN, operative extremity, procedure, allergies, antibiotics, DVT prophylaxis/SCDS, and fire precautions/plan were reviewed, and all were in agreement.    A longitudinal incision was made in the palm in-line with the 4th ray, just radial to the hook of hamate.  The incision extended from the distal wrist crease to Gutiérrez's cardinal line. The skin and the subcutaneous tissue were sharply incised.  Dissection was carried down to the palmar fascia, which was incised followed by release of the transverse carpal ligament.  Distally the sentinel fat pad and  superficial arch were identified and proximally the dissection was carried out under direct visualization to divide the antebrachial fascia and remainder of the transverse carpal ligament. A complete release was confirmed and exploration of the carpal canal revealed no masses. The median nerve and its branches were intact.  Hemostasis was achieved.  The wound was irrigated.  Closure was performed with 4-0 Monocryl suture.  Sterile dressings were applied.  Capillary refill was intact < 2 seconds.      The patient was taken to the recovery room in stable condition.      All counts were correct at the end of the case.    There were no complications.    POSTOPERATIVE PLAN: return to clinic in 1-2 weeks for wound check    ERIN HURT MD

## 2025-07-16 NOTE — DISCHARGE INSTRUCTIONS
Date: 2025    DIAGNOSIS  1. Bilateral hand numbness        MEDICATIONS   Resume all home medications as directed unless otherwise instructed during this hospitalization. If there is any question, double check with your primary care provider.  Start new discharge medications as directed.    Take 1 tablet of 650 mg Tylenol (acetaminophen) Arthritis Strength (extended release) and 1 tablet of Aleve (naproxen) 220 mg in the morning with breakfast and in the evening with dinner.     For breakthrough pain use narcotic pain medication as prescribed.    Do not drive or operate machinery while taking narcotic pain medications.   If you are taking other Tylenol containing medicines at home, be sure NOT to exceed 4 gram's (4000 milligrams) of Tylenol per day.   If you are taking pain medications, be sure to take Colace (docusate sodium) as well to prevent constipation. If constipated, try adding another cathartic or enema.  If nausea and vomiting, call the hospital or seek medical attention.    ACTIVITY   Weight bearin lb coffee cup weight bearing to operative extremity    DIET  Resume same diet prior to your hospital admission.    WOUND   Leave dressing on until you are seen in clinic for your follow up visit.   Watch for signs and symptoms of infection of your wounds including; pain, redness, swelling, drainage or fever.  If you notice any of these symptoms please call or seek medical attention.    Keep wound clean, dry, and intact.  Do not submerge wounds in water until they are healed. No baths, soaking, swimming, or prolonged water exposure for 4 weeks after surgery.    RETURN   Follow-up with Orthopedic Clinic as directed.     Future Appointments   Date Time Provider Department [place]   2025  4:00 PM Sandra Thomas PA-C [facility]   2026  8:00 AM Jade Santoyo DO [facility]       Call the [facility] at [phone] during business hours for any symptoms  such as:    * Fevers with Temperature greater than 101.5 degrees.   * Pus drainage from wound site.   * Severe pain, not controlled by medication.   * Persistent nausea, vomiting and inablility to tolerate fluids.    If you are receiving care in [place], you may call the Orthopedic clinic at [phone].    FOR URGENT PROBLEMS ONLY, after hours or on weekends call the hospital  at [phone] and ask to speak with the orthopedic resident on call.      [facility]  Home Care Following Anesthesia  For 24 hours after surgery:  Get plenty of rest.  A responsible adult must stay with you for at least 24 hours after you leave the surgery center.  Do not drive or use heavy equipment.  If you have weakness or tingling, don't drive or use heavy equipment until this feeling goes away.   Do not drink alcohol.   Avoid strenuous or risky activities.  Ask for help when climbing stairs.  You may feel lightheaded.  IF so, sit for a few minutes before standing.  Have someone help you get up.   If you have nausea (feel sick to your stomach): Drink only clear liquids such as apple juice, ginger ale, broth or 7-Up.  Rest may also help.  Be sure to drink enough fluids.  Move to a regular diet as you feel able.   You may have a slight fever.  Call the doctor if your fever is over 100 F (37.7 C) (taken under the tongue) or lasts longer than 24 hours.  You may have a dry mouth, a sore throat, muscle aches or trouble sleeping. These should go away after 24 hours.  Do not make important or legal decisions.   It is recommended to avoid smoking.          Tips for taking pain medications  To get the best pain relief possible, remember these points:  Take pain medications as directed, before pain becomes severe.  Pain medication can upset your stomach: taking it with food may help.  Constipation is a common side effect of pain medication. Drink plenty of  fluids.  Eat foods high in fiber. Take a  stool softener if recommended by your doctor or pharmacist.  Do not drink alcohol, drive or operate machinery while taking pain medications.  Ask about other ways to control pain, such as with heat, ice or relaxation.    Tylenol/Acetaminophen Consumption    If you feel your pain relief is insufficient, you may take Tylenol/Acetaminophen in addition to your narcotic pain medication.   Be careful not to exceed 4,000 mg of Tylenol/Acetaminophen in a 24 hour period from all sources.  If you are taking extra strength Tylenol/acetaminophen (500 mg), the maximum dose is 8 tablets in 24 hours.  If you are taking regular strength acetaminophen (325 mg), the maximum dose is 12 tablets in 24 hours.    Call a doctor for any of the following:  Signs of infection (fever, growing tenderness at the surgery site, a large amount of drainage or bleeding, severe pain, foul-smelling drainage, redness, swelling).  It has been over 8 to 10 hours since surgery and you are still not able to urinate (pass water).  Headache for over 24 hours.  Signs of Covid-19 infection (temperature over 100 degrees, shortness of breath, cough, loss of taste/smell, generalized body aches, persistent headache, chills, sore throat, nausea/vomiting/diarrhea)    Your doctor is:  Dr. Ml Valerio, Orthopaedics: [phone]               After hours and weekends call the hospital @ [phone] and ask for the resident on call for:  Orthopaedics  For emergency care, call the:  [facility]:  [phone] (TTY for hearing impaired: [phone])

## 2025-07-23 ENCOUNTER — OFFICE VISIT (OUTPATIENT)
Dept: ORTHOPEDICS | Facility: CLINIC | Age: 60
End: 2025-07-23
Payer: COMMERCIAL

## 2025-07-23 DIAGNOSIS — G56.02 CARPAL TUNNEL SYNDROME ON LEFT: Primary | ICD-10-CM

## 2025-07-23 DIAGNOSIS — G56.01 CARPAL TUNNEL SYNDROME, RIGHT: ICD-10-CM

## 2025-07-23 PROCEDURE — 99024 POSTOP FOLLOW-UP VISIT: CPT | Performed by: PHYSICIAN ASSISTANT

## 2025-07-23 NOTE — NURSING NOTE
Reason For Visit:   Chief Complaint   Patient presents with    Surgical Followup     Post op right Wrist carpal tunnel release  DOS: 7/16/25       Primary MD: Jade Santoyo  Ref. MD: Jenna    Age: 60 year old    ?  No      LMP  (LMP Unknown)       Pain Assessment  Patient Currently in Pain: Yes  Patient's Stated Pain Goal: 3  Primary Pain Location: Wrist (Right)  Pain Descriptors: Intermittent, Aching, Dull  Alleviating Factors: NSAIDS    Hand Dominance Evaluation  Hand Dominance: Right          QuickDASH Assessment      7/2/2025     5:54 AM   QuickDASH Main   1. Open a tight or new jar Mild difficulty   2. Do heavy household chores (e.g., wash walls, floors) Mild difficulty   3. Carry a shopping bag or briefcase Mild difficulty   4. Wash your back Moderate difficulty   5. Use a knife to cut food No difficulty   6. Recreational activities in which you take some force or impact through your arm, shoulder or hand (e.g., golf, hammering, tennis, etc.) Mild difficulty   7. During the past week, to what extent has your arm, shoulder or hand problem interfered with your normal social activities with family, friends, neighbours or groups Slightly   8. During the past week, were you limited in your work or other regular daily activities as a result of your arm, shoulder or hand problem Not limited at all   9. Arm, shoulder or hand pain Mild   10.Tingling (pins and needles) in your arm,shoulder or hand Severe   11. During the past week, how much difficulty have you had sleeping because of the pain in your arm, shoulder or hand Moderate difficulty   Quickdash Ability Score 29.55          Current Outpatient Medications   Medication Sig Dispense Refill    amLODIPine (NORVASC) 5 MG tablet Take 1 tablet (5 mg) by mouth daily. 90 tablet 3    glucosamine 500 MG CAPS Take 1 tablet by mouth At Bedtime Glucosamine with tumeric      ibuprofen (ADVIL/MOTRIN) 600 MG tablet TAKE 1 TABLET EVERY EVERY 6-8 HOURS AS NEEDED  PAIN.      lisinopril-hydrochlorothiazide (ZESTORETIC) 20-12.5 MG tablet Take 2 tablets by mouth daily. 180 tablet 3    loratadine (CLARITIN) 10 MG tablet Take 10 mg by mouth daily as needed for allergies.      magnesium oxide 400 MG CAPS Take 1 capsule by mouth daily. 90 capsule 1       Allergies   Allergen Reactions    Seasonal Allergies        SYDNIE DOMINGUEZ, ATC

## 2025-07-23 NOTE — PROGRESS NOTES
Date of Service: Jul 23, 2025    Reason for visit: Postoperative follow-up    Date of Surgery: 7/16/2025    Procedure Performed: right open carpal tunnel release    Surgeon: Dr. Ml Valerio    Interval events: Licha Birch comes to see me in follow-up today. Numbness and tingling present preoperatively IS improved. No fevers or chills. No longer taking pain medication. No other issues to report.    Physical examination:  Well-developed, well-nourished and in no acute distress.  Alert and oriented to surroundings.  On examination of the right wrist, incision is well-approximated. There is no erythema, drainage, or dehiscence. Sensation is intact in median, radial and ulnar nerve distributions. Thumb opposition is intact. Patient can actively flex and extend all digits and thumb. Interosseous muscles, EPL, and FDP-2 fire. Fingers are warm and well-perfused.     Assessment: Licha Birch is a 60 year old y/o female who presents s/p right open carpal tunnel release.     Plan: Patient should continue to wash wound with soap and water daily and pat dry. No immersing the wound in water for prolonged periods such as tub baths or dishwashing without gloves until wound has healed. Do not lift anything heavier than a coffee cup or do forceful gripping with your operative hand until the wound has healed as this may split the wound open. This will typically take 1-2 more weeks. You can use the hand for light activities like eating, shaving, typing, and brushing your teeth. After the wound has completley healed, you may progress your activity gradually according to your comfort level, but heavy lifting (> 10 pounds),  forceful gripping, and weight bearing directly on the palm (such as pushups) are discouraged for the first 6 weeks after surgery to give the area time to heal. Painful activities should be avoided. The patient will follow-up at 6 weeks postop if they have any questions or concerns regarding their continued  progress. However, if they are back to full activity, are satisfied with the outcome of surgery, and have no remaining questions at that time, this visit may be deferred. All questions were answered and the patient was in agreement with the plan.     Patient like to proceed with scheduling her left open carpal tunnel release.  Case request placed.    RAJIV SHETH PA-C  Orthopaedic Surgery

## 2025-07-23 NOTE — LETTER
7/23/2025      Licha Birch  [address] [address]      Dear Colleague,    Thank you for referring your patient, Licha Birch, to the [facility] [place]. Please see a copy of my visit note below.    Date of Service: Jul 23, 2025    Reason for visit: Postoperative follow-up    Date of Surgery: 7/16/2025    Procedure Performed: right open carpal tunnel release    Surgeon: Dr. Ml Valerio    Interval events: Licha Birch comes to see me in follow-up today. Numbness and tingling present preoperatively IS improved. No fevers or chills. No longer taking pain medication. No other issues to report.    Physical examination:  Well-developed, well-nourished and in no acute distress.  Alert and oriented to surroundings.  On examination of the right wrist, incision is well-approximated. There is no erythema, drainage, or dehiscence. Sensation is intact in median, radial and ulnar nerve distributions. Thumb opposition is intact. Patient can actively flex and extend all digits and thumb. Interosseous muscles, EPL, and FDP-2 fire. Fingers are warm and well-perfused.     Assessment: Licha Birch is a 60 year old y/o female who presents s/p right open carpal tunnel release.     Plan: Patient should continue to wash wound with soap and water daily and pat dry. No immersing the wound in water for prolonged periods such as tub baths or dishwashing without gloves until wound has healed. Do not lift anything heavier than a coffee cup or do forceful gripping with your operative hand until the wound has healed as this may split the wound open. This will typically take 1-2 more weeks. You can use the hand for light activities like eating, shaving, typing, and brushing your teeth. After the wound has completley healed, you may progress your activity gradually according to your comfort level, but heavy lifting (> 10 pounds),  forceful gripping, and weight bearing directly on the palm (such as pushups)  are discouraged for the first 6 weeks after surgery to give the area time to heal. Painful activities should be avoided. The patient will follow-up at 6 weeks postop if they have any questions or concerns regarding their continued progress. However, if they are back to full activity, are satisfied with the outcome of surgery, and have no remaining questions at that time, this visit may be deferred. All questions were answered and the patient was in agreement with the plan.     Patient like to proceed with scheduling her left open carpal tunnel release.  Case request placed.    SANDRA SHETH PA-C  Orthopaedic Surgery    Again, thank you for allowing me to participate in the care of your patient.        Sincerely,        Sandra Sheth PA-C    Electronically signed

## 2025-07-24 ENCOUNTER — TELEPHONE (OUTPATIENT)
Dept: ORTHOPEDICS | Facility: CLINIC | Age: 60
End: 2025-07-24
Payer: COMMERCIAL

## 2025-07-24 NOTE — TELEPHONE ENCOUNTER
Left voice message for patient to return phone call to schedule surgery with Dr. Valerio.     Provided surgery scheduling phone number: [phone].    Felice Cote on 7/24/2025 at 9:19 AM

## 2025-07-30 PROBLEM — G56.02 CARPAL TUNNEL SYNDROME ON LEFT: Status: ACTIVE | Noted: 2025-07-23

## 2025-08-05 ENCOUNTER — TELEPHONE (OUTPATIENT)
Dept: FAMILY MEDICINE | Facility: CLINIC | Age: 60
End: 2025-08-05
Payer: COMMERCIAL

## 2025-08-11 DIAGNOSIS — I10 ESSENTIAL HYPERTENSION: ICD-10-CM

## (undated) DEVICE — DRSG STERI STRIP 1/2X4" R1547

## (undated) DEVICE — LINEN TOWEL PACK X5 5464

## (undated) DEVICE — SU VICRYL 2-0 CT 36" J957H

## (undated) DEVICE — LINEN GOWN OVERSIZE 5408

## (undated) DEVICE — DRSG TEGADERM 4X4 3/4" 1626W

## (undated) DEVICE — Device

## (undated) DEVICE — PREP POVIDONE IODINE SCRUB 7.5% 120ML

## (undated) DEVICE — BLADE KNIFE SURG 15 371115

## (undated) DEVICE — SOL NACL 0.9% IRRIG 500ML BOTTLE 2F7123

## (undated) DEVICE — PACK TOTAL HIP W/POUCH RIVERSIDE LATEX FREE

## (undated) DEVICE — PREP DURAPREP 26ML APL 8630

## (undated) DEVICE — SU PDS II 3-0 PS-1 18" Z683G

## (undated) DEVICE — BLADE SAW SAGITTAL STRK 25X79.5X1.24MM 4/2000 2108-318-000

## (undated) DEVICE — SU DERMABOND ADVANCED .7ML DNX12

## (undated) DEVICE — SU MONOCRYL 4-0 PS-2 27" UND Y426H

## (undated) DEVICE — ESU GROUND PAD UNIVERSAL W/O CORD

## (undated) DEVICE — LINEN GOWN X4 5410

## (undated) DEVICE — STRAP KNEE/BODY 31143004

## (undated) DEVICE — GLOVE PROTEXIS W/NEU-THERA 7.0  2D73TE70

## (undated) DEVICE — STRAP STIRRUP W/SLIP 30187-030

## (undated) DEVICE — SU VICRYL 2-0 CT-1 27" UND J259H

## (undated) DEVICE — PREP SKIN SCRUB TRAY 4461A

## (undated) DEVICE — LINEN MAYO STAND COVER OVERSIZE PACK 5458

## (undated) DEVICE — SU VICRYL 0 CT-1 3X27" J430T

## (undated) DEVICE — GLOVE PROTEXIS W/NEU-THERA 8.0  2D73TE80

## (undated) DEVICE — LINEN ORTHO PACK 5446

## (undated) DEVICE — SU SILK 2-0 TIE 12X30" A305H

## (undated) DEVICE — SUCTION MANIFOLD DORNOCH ULTRA CART UL-CL500

## (undated) DEVICE — SPONGE LAP 18X18" X8435

## (undated) DEVICE — DRSG KERLIX 4 1/2"X4YDS ROLL 6730

## (undated) DEVICE — SU VICRYL 0 CTX 36" J370H

## (undated) DEVICE — DRAIN JACKSON PRATT ROUND W/TROCAR 15FR LF JP-HUR151

## (undated) DEVICE — COVER CAMERA IN-LIGHT DISP LT-C02

## (undated) DEVICE — PREP CHLORAPREP 26ML TINTED HI-LITE ORANGE 930815

## (undated) DEVICE — GOWN IMPERVIOUS SPECIALTY XLG/XLONG 32474

## (undated) DEVICE — SOL NACL 0.9% IRRIG 1000ML BOTTLE 2F7124

## (undated) DEVICE — LINEN BACK PACK 5440

## (undated) DEVICE — DRAPE SLEEVE 599

## (undated) DEVICE — SU ETHIBOND 0 CT-1 CR 8X18" CX21D

## (undated) DEVICE — DRAPE STOCKINETTE 8" 8586

## (undated) DEVICE — DRSG DRAIN 4X4" 7086

## (undated) DEVICE — GLOVE PROTEXIS POWDER FREE 8.5 ORTHOPEDIC 2D73ET85

## (undated) DEVICE — PREP DURAPREP REMOVER 4OZ 8611

## (undated) DEVICE — GLOVE PROTEXIS BLUE W/NEU-THERA 8.0  2D73EB80

## (undated) DEVICE — DEVICE RETRIEVER HEWSON 71111579

## (undated) DEVICE — POSITIONER ABDUCTION PILLOW FOAM MED FP-ABDUCTM

## (undated) DEVICE — PACK MINOR HAND CUSTOM ASC 37-0097A

## (undated) DEVICE — DRSG XEROFORM 1X8"

## (undated) DEVICE — SOL WATER IRRIG 1000ML BOTTLE 2F7114

## (undated) DEVICE — SU SILK 2-0 SH 30" K833H

## (undated) DEVICE — GLOVE PROTEXIS BLUE W/NEU-THERA 7.5  2D73EB75

## (undated) DEVICE — DRSG AQUACEL AG 3.5X9.75" HYDROFIBER 412011

## (undated) DEVICE — ESU ELEC BLADE 6" COATED E1450-6

## (undated) DEVICE — ESU PENCIL W/HOLSTER E2350H

## (undated) DEVICE — WIPES FOLEY CARE SURESTEP PROVON DFC100

## (undated) DEVICE — DRAIN JACKSON PRATT RESERVOIR 400ML SU130-1000

## (undated) DEVICE — SUCTION MANIFOLD NEPTUNE 2 SYS 1 PORT 702-025-000

## (undated) DEVICE — CATH TRAY FOLEY SURESTEP 16FR WDRAIN BAG STLK LATEX A300316A

## (undated) DEVICE — SU ETHIBOND 1 CTX CR 8/18" CX30D

## (undated) RX ORDER — ONDANSETRON 2 MG/ML
INJECTION INTRAMUSCULAR; INTRAVENOUS
Status: DISPENSED
Start: 2019-10-25

## (undated) RX ORDER — ONDANSETRON 2 MG/ML
INJECTION INTRAMUSCULAR; INTRAVENOUS
Status: DISPENSED
Start: 2020-01-17

## (undated) RX ORDER — ROPIVACAINE HYDROCHLORIDE 5 MG/ML
INJECTION, SOLUTION EPIDURAL; INFILTRATION; PERINEURAL
Status: DISPENSED
Start: 2019-01-09

## (undated) RX ORDER — LIDOCAINE HYDROCHLORIDE AND EPINEPHRINE 10; 10 MG/ML; UG/ML
INJECTION, SOLUTION INFILTRATION; PERINEURAL
Status: DISPENSED
Start: 2025-07-16

## (undated) RX ORDER — FENTANYL CITRATE 50 UG/ML
INJECTION, SOLUTION INTRAMUSCULAR; INTRAVENOUS
Status: DISPENSED
Start: 2020-01-17

## (undated) RX ORDER — GABAPENTIN 300 MG/1
CAPSULE ORAL
Status: DISPENSED
Start: 2020-01-17

## (undated) RX ORDER — CEFAZOLIN SODIUM 1 G/3ML
INJECTION, POWDER, FOR SOLUTION INTRAMUSCULAR; INTRAVENOUS
Status: DISPENSED
Start: 2020-01-17

## (undated) RX ORDER — GABAPENTIN 300 MG/1
CAPSULE ORAL
Status: DISPENSED
Start: 2019-10-25

## (undated) RX ORDER — PROPOFOL 10 MG/ML
INJECTION, EMULSION INTRAVENOUS
Status: DISPENSED
Start: 2019-10-25

## (undated) RX ORDER — PROPOFOL 10 MG/ML
INJECTION, EMULSION INTRAVENOUS
Status: DISPENSED
Start: 2020-01-17

## (undated) RX ORDER — ACETAMINOPHEN 325 MG/1
TABLET ORAL
Status: DISPENSED
Start: 2019-10-25

## (undated) RX ORDER — HYDROMORPHONE HYDROCHLORIDE 1 MG/ML
INJECTION, SOLUTION INTRAMUSCULAR; INTRAVENOUS; SUBCUTANEOUS
Status: DISPENSED
Start: 2019-10-25

## (undated) RX ORDER — TRIAMCINOLONE ACETONIDE 40 MG/ML
INJECTION, SUSPENSION INTRA-ARTICULAR; INTRAMUSCULAR
Status: DISPENSED
Start: 2019-01-09

## (undated) RX ORDER — CELECOXIB 200 MG/1
CAPSULE ORAL
Status: DISPENSED
Start: 2019-10-25

## (undated) RX ORDER — TRIAMCINOLONE ACETONIDE 40 MG/ML
INJECTION, SUSPENSION INTRA-ARTICULAR; INTRAMUSCULAR
Status: DISPENSED
Start: 2019-07-10

## (undated) RX ORDER — FENTANYL CITRATE 50 UG/ML
INJECTION, SOLUTION INTRAMUSCULAR; INTRAVENOUS
Status: DISPENSED
Start: 2019-10-25

## (undated) RX ORDER — LIDOCAINE HYDROCHLORIDE 20 MG/ML
INJECTION, SOLUTION EPIDURAL; INFILTRATION; INTRACAUDAL; PERINEURAL
Status: DISPENSED
Start: 2020-01-17

## (undated) RX ORDER — OXYCODONE HYDROCHLORIDE 5 MG/1
TABLET ORAL
Status: DISPENSED
Start: 2019-10-25

## (undated) RX ORDER — ACETAMINOPHEN 325 MG/1
TABLET ORAL
Status: DISPENSED
Start: 2020-01-17

## (undated) RX ORDER — CEFAZOLIN SODIUM 2 G/100ML
INJECTION, SOLUTION INTRAVENOUS
Status: DISPENSED
Start: 2019-10-25

## (undated) RX ORDER — LIDOCAINE HYDROCHLORIDE 20 MG/ML
INJECTION, SOLUTION EPIDURAL; INFILTRATION; INTRACAUDAL; PERINEURAL
Status: DISPENSED
Start: 2019-10-25

## (undated) RX ORDER — ROPIVACAINE HYDROCHLORIDE 5 MG/ML
INJECTION, SOLUTION EPIDURAL; INFILTRATION; PERINEURAL
Status: DISPENSED
Start: 2019-07-10

## (undated) RX ORDER — CEFAZOLIN SODIUM 2 G/100ML
INJECTION, SOLUTION INTRAVENOUS
Status: DISPENSED
Start: 2020-01-17

## (undated) RX ORDER — HYDROMORPHONE HYDROCHLORIDE 1 MG/ML
INJECTION, SOLUTION INTRAMUSCULAR; INTRAVENOUS; SUBCUTANEOUS
Status: DISPENSED
Start: 2020-01-17